# Patient Record
Sex: MALE | Employment: FULL TIME | URBAN - METROPOLITAN AREA
[De-identification: names, ages, dates, MRNs, and addresses within clinical notes are randomized per-mention and may not be internally consistent; named-entity substitution may affect disease eponyms.]

---

## 2018-11-20 ENCOUNTER — OFFICE VISIT (OUTPATIENT)
Dept: FAMILY MEDICINE | Facility: CLINIC | Age: 58
End: 2018-11-20
Payer: COMMERCIAL

## 2018-11-20 ENCOUNTER — OFFICE VISIT (OUTPATIENT)
Dept: PHARMACY | Facility: CLINIC | Age: 58
End: 2018-11-20
Payer: COMMERCIAL

## 2018-11-20 VITALS
BODY MASS INDEX: 26.67 KG/M2 | TEMPERATURE: 98 F | SYSTOLIC BLOOD PRESSURE: 159 MMHG | HEART RATE: 77 BPM | DIASTOLIC BLOOD PRESSURE: 90 MMHG | HEIGHT: 68 IN | WEIGHT: 176 LBS | RESPIRATION RATE: 16 BRPM | OXYGEN SATURATION: 98 %

## 2018-11-20 DIAGNOSIS — I10 HYPERTENSION GOAL BP (BLOOD PRESSURE) < 130/80: ICD-10-CM

## 2018-11-20 DIAGNOSIS — Z79.4 TYPE 2 DIABETES MELLITUS WITHOUT COMPLICATION, WITH LONG-TERM CURRENT USE OF INSULIN (H): Primary | ICD-10-CM

## 2018-11-20 DIAGNOSIS — E11.9 TYPE 2 DIABETES MELLITUS WITHOUT COMPLICATION, WITH LONG-TERM CURRENT USE OF INSULIN (H): Primary | ICD-10-CM

## 2018-11-20 DIAGNOSIS — R39.11 URINARY HESITANCY: ICD-10-CM

## 2018-11-20 DIAGNOSIS — Z12.11 SPECIAL SCREENING FOR MALIGNANT NEOPLASMS, COLON: ICD-10-CM

## 2018-11-20 DIAGNOSIS — Z23 FLU VACCINE NEED: ICD-10-CM

## 2018-11-20 DIAGNOSIS — I10 BENIGN ESSENTIAL HYPERTENSION: ICD-10-CM

## 2018-11-20 DIAGNOSIS — K21.00 GASTROESOPHAGEAL REFLUX DISEASE WITH ESOPHAGITIS: ICD-10-CM

## 2018-11-20 DIAGNOSIS — Z79.899 MEDICATION MANAGEMENT: ICD-10-CM

## 2018-11-20 DIAGNOSIS — E78.5 HYPERLIPIDEMIA LDL GOAL <100: ICD-10-CM

## 2018-11-20 LAB
ALBUMIN SERPL-MCNC: 4.3 G/DL (ref 3.4–5)
ALP SERPL-CCNC: 137 U/L (ref 40–150)
ALT SERPL W P-5'-P-CCNC: 24 U/L (ref 0–70)
ANION GAP SERPL CALCULATED.3IONS-SCNC: 7 MMOL/L (ref 3–14)
AST SERPL W P-5'-P-CCNC: 13 U/L (ref 0–45)
BILIRUB SERPL-MCNC: 0.5 MG/DL (ref 0.2–1.3)
BILIRUBIN UR: NEGATIVE MG/DL
BLOOD UR: NEGATIVE MG/DL
BUN SERPL-MCNC: 15 MG/DL (ref 7–30)
CALCIUM SERPL-MCNC: 9.3 MG/DL (ref 8.5–10.1)
CHLORIDE SERPL-SCNC: 108 MMOL/L (ref 94–109)
CLARITY, URINE: CLEAR
CO2 SERPL-SCNC: 25 MMOL/L (ref 20–32)
COLOR UR: YELLOW
CREAT SERPL-MCNC: 0.88 MG/DL (ref 0.66–1.25)
CREAT UR-MCNC: 139 MG/DL
ERYTHROCYTE [DISTWIDTH] IN BLOOD BY AUTOMATED COUNT: 13.9 % (ref 10–15)
GFR SERPL CREATININE-BSD FRML MDRD: 88 ML/MIN/1.7M2
GLUCOSE SERPL-MCNC: 70 MG/DL (ref 70–99)
GLUCOSE URINE: NEGATIVE MG/DL
HBA1C MFR BLD: 7 % (ref 4.1–5.7)
HCT VFR BLD AUTO: 44.4 % (ref 40–53)
HGB BLD-MCNC: 14.3 G/DL (ref 13.3–17.7)
KETONES UR QL: NEGATIVE MG/DL
LEUKOCYTE ESTERASE UR: NEGATIVE U/L
MCH RBC QN AUTO: 28.9 PG (ref 26.5–33)
MCHC RBC AUTO-ENTMCNC: 32.2 G/DL (ref 31.5–36.5)
MCV RBC AUTO: 90 FL (ref 78–100)
MICROALBUMIN UR-MCNC: 88 MG/L
MICROALBUMIN/CREAT UR: 63.02 MG/G CR (ref 0–17)
NITRITE UR QL STRIP: NEGATIVE MG/DL
PH UR STRIP: 5.5 [PH] (ref 5–7)
PLATELET # BLD AUTO: 322 10E9/L (ref 150–450)
POTASSIUM SERPL-SCNC: 4.5 MMOL/L (ref 3.4–5.3)
PROT SERPL-MCNC: 8.2 G/DL (ref 6.8–8.8)
PROTEIN UR: NORMAL MG/DL
PSA SERPL-ACNC: 1.18 UG/L (ref 0–4)
RBC # BLD AUTO: 4.94 10E12/L (ref 4.4–5.9)
SODIUM SERPL-SCNC: 140 MMOL/L (ref 133–144)
SP GR UR STRIP: 1.02
TSH SERPL DL<=0.005 MIU/L-ACNC: 1.83 MU/L (ref 0.4–4)
UROBILINOGEN UR STRIP-ACNC: NORMAL E.U./DL
VIT B12 SERPL-MCNC: 371 PG/ML (ref 193–986)
WBC # BLD AUTO: 12.2 10E9/L (ref 4–11)

## 2018-11-20 RX ORDER — INSULIN GLARGINE 100 [IU]/ML
25 INJECTION, SOLUTION SUBCUTANEOUS 2 TIMES DAILY
Qty: 15 ML | Refills: 2 | Status: SHIPPED | OUTPATIENT
Start: 2018-11-20 | End: 2019-01-08

## 2018-11-20 RX ORDER — LIRAGLUTIDE 6 MG/ML
1.2 INJECTION SUBCUTANEOUS DAILY
COMMUNITY
End: 2018-11-20

## 2018-11-20 RX ORDER — AMLODIPINE BESYLATE 10 MG/1
10 TABLET ORAL DAILY
COMMUNITY
End: 2018-11-20

## 2018-11-20 RX ORDER — ASPIRIN 81 MG/1
81 TABLET, CHEWABLE ORAL DAILY
COMMUNITY
End: 2018-11-20

## 2018-11-20 RX ORDER — INSULIN GLARGINE 100 [IU]/ML
25 INJECTION, SOLUTION SUBCUTANEOUS 2 TIMES DAILY
COMMUNITY
End: 2018-11-20

## 2018-11-20 RX ORDER — ATORVASTATIN CALCIUM 10 MG/1
40 TABLET, FILM COATED ORAL DAILY
Qty: 90 TABLET | Refills: 1 | Status: SHIPPED | OUTPATIENT
Start: 2018-11-20 | End: 2019-02-26

## 2018-11-20 RX ORDER — METOPROLOL TARTRATE 100 MG
100 TABLET ORAL 2 TIMES DAILY
COMMUNITY
End: 2018-11-20

## 2018-11-20 RX ORDER — ASPIRIN 81 MG/1
81 TABLET, CHEWABLE ORAL DAILY
Qty: 100 TABLET | Refills: 1 | Status: SHIPPED | OUTPATIENT
Start: 2018-11-20 | End: 2019-11-12

## 2018-11-20 RX ORDER — METFORMIN HYDROCHLORIDE 500 MG/5ML
500 SOLUTION ORAL
COMMUNITY
End: 2018-11-20

## 2018-11-20 RX ORDER — LISINOPRIL 40 MG/1
40 TABLET ORAL DAILY
Qty: 90 TABLET | Refills: 1 | Status: SHIPPED | OUTPATIENT
Start: 2018-11-20 | End: 2019-07-12

## 2018-11-20 RX ORDER — LIRAGLUTIDE 6 MG/ML
1.2 INJECTION SUBCUTANEOUS DAILY
Qty: 6 ML | Refills: 2 | Status: SHIPPED | OUTPATIENT
Start: 2018-11-20 | End: 2019-03-12

## 2018-11-20 RX ORDER — METOPROLOL TARTRATE 100 MG
100 TABLET ORAL 2 TIMES DAILY
Qty: 60 TABLET | Refills: 2 | Status: SHIPPED | OUTPATIENT
Start: 2018-11-20 | End: 2019-03-13

## 2018-11-20 RX ORDER — AMLODIPINE BESYLATE 10 MG/1
10 TABLET ORAL DAILY
Qty: 30 TABLET | Refills: 2 | Status: SHIPPED | OUTPATIENT
Start: 2018-11-20 | End: 2019-03-12

## 2018-11-20 ASSESSMENT — PATIENT HEALTH QUESTIONNAIRE - PHQ9
5. POOR APPETITE OR OVEREATING: SEVERAL DAYS
SUM OF ALL RESPONSES TO PHQ QUESTIONS 1-9: 4

## 2018-11-20 ASSESSMENT — ANXIETY QUESTIONNAIRES
6. BECOMING EASILY ANNOYED OR IRRITABLE: SEVERAL DAYS
2. NOT BEING ABLE TO STOP OR CONTROL WORRYING: SEVERAL DAYS
7. FEELING AFRAID AS IF SOMETHING AWFUL MIGHT HAPPEN: NOT AT ALL
GAD7 TOTAL SCORE: 6
1. FEELING NERVOUS, ANXIOUS, OR ON EDGE: SEVERAL DAYS
3. WORRYING TOO MUCH ABOUT DIFFERENT THINGS: SEVERAL DAYS
5. BEING SO RESTLESS THAT IT IS HARD TO SIT STILL: SEVERAL DAYS
IF YOU CHECKED OFF ANY PROBLEMS ON THIS QUESTIONNAIRE, HOW DIFFICULT HAVE THESE PROBLEMS MADE IT FOR YOU TO DO YOUR WORK, TAKE CARE OF THINGS AT HOME, OR GET ALONG WITH OTHER PEOPLE: NOT DIFFICULT AT ALL

## 2018-11-20 NOTE — PROGRESS NOTES
"       HPI       Phani K Phani Denney is a 58 year old  who presents for   Chief Complaint   Patient presents with     Establish Care     Pt. presents to the clinic today to establish care.    58 year-old male originally from the [place] presents as new patient to establish care.   PMH: Type 2 DM for many years. Started on Victoza in the past year but did not tolerate dose increase to 1.8mg. Developed deep pain in chest, so he decreased dose to 1.2 mg per day. He is also on Glargine insulin and metformin which he tolerates well. He takes Glargine twice daily because it works better for him Reports BS range from 7-150. He does have symptoms of hypoglycemia with BS of 70 and these are relieved by eating. Highest BS he reports is 150. Last recorded A1c is 8.4    HTN: is out of lisinopril currently. Usually takes lisinopril, amlodipine, and metoprolol. Occasional headache which he relates to his eyes to some extent. Reports had bilateral cataract repair 1 year ago and there was\"something wrong\" with left eye but this has gotten better. Due for eye exam 12/2018 or early 1/19. Renal function on chart review has been WNL.     Hyperlipidemia: not fasting but taking lipitor regularly    LIZ: history of LIZ, not every day. Takes ranitidine 150mg twice daily most days.  Does not use caffeine or alcohol or drugs. Remote history of snuff, but nothing recent.      No . Speaks and understands English. Alternate language is Sinhala    Problem, Medication and Allergy Lists were      Patient Active Problem List    Diagnosis Date Noted     Type 2 diabetes mellitus without complication (H) 10/15/2015     Priority: Medium     Pain in joint, shoulder region 03/11/2014     Priority: Medium     Impingement syndrome, shoulder 10/28/2013     Priority: Medium     Advanced directives, counseling/discussion 03/12/2013     Priority: Medium     Advance Care Planning:   ACP Review and Resources Provided:  Reviewed chart for advance " care plan.  Chapo Villaloboskadi Cowan has no plan or code status on file. Discussed available resources and provided with information. Confirmed code status reflects current choices pending further ACP discussions.  Confirmed/documented designated decision maker(s). See permanent comments section of demographics in clinical tab.   Added by Eduar Shaw on 3/12/2013           Type 2 diabetes, HbA1C goal < 8% (H) 04/26/2012     Priority: Medium     CARDIOVASCULAR SCREENING; LDL GOAL LESS THAN 100 12/23/2011     Priority: Medium     Hypertension goal BP (blood pressure) < 130/80 12/23/2011     Priority: Medium     Hypercholesteremia 12/10/2010     Priority: Medium         Current Outpatient Prescriptions   Medication Sig Dispense Refill     amLODIPine (NORVASC) 10 MG tablet Take 1 tablet (10 mg) by mouth daily 30 tablet 2     aspirin 81 MG chewable tablet Take 1 tablet (81 mg) by mouth daily 100 tablet 1     atorvastatin (LIPITOR) 10 MG tablet Take 4 tablets (40 mg) by mouth daily 90 tablet 1     blood glucose monitoring (ACCU-CHEK MULTICLIX) lancets Use to test blood sugar 2 x daily or as directed. 100 each 1     blood glucose monitoring (NO BRAND SPECIFIED) test strip Test twice daily. 2 Box 2     insulin glargine (BASAGLAR KWIKPEN) 100 UNIT/ML pen Inject 25 Units Subcutaneous 2 times daily 15 mL 2     insulin pen needle (31G X 8 MM) 31G X 8 MM miscellaneous Use to inject insulin twice daily 100 each 0     liraglutide (VICTOZA) 18 MG/3ML solution Inject 1.2 mg Subcutaneous daily 6 mL 2     lisinopril (PRINIVIL/ZESTRIL) 40 MG tablet Take 1 tablet (40 mg) by mouth daily 90 tablet 1     metFORMIN (GLUCOPHAGE) 500 MG tablet Take 2 tablets (1,000 mg) by mouth 2 times daily (with meals) 120 tablet 2     metoprolol tartrate (LOPRESSOR) 100 MG tablet Take 1 tablet (100 mg) by mouth 2 times daily 60 tablet 2     ranitidine (ZANTAC) 150 MG tablet Take 1 tablet (150 mg) by mouth 2 times daily 60 tablet 2     [DISCONTINUED]  amLODIPine (NORVASC) 10 MG tablet Take 10 mg by mouth daily       [DISCONTINUED] amLODIPine (NORVASC) 5 MG tablet Take 1 tablet (5 mg) by mouth daily 90 tablet 1     [DISCONTINUED] ATORVASTATIN CALCIUM PO Take 40 mg by mouth daily       [DISCONTINUED] insulin glargine (BASAGLAR KWIKPEN) 100 UNIT/ML pen Inject 25 Units Subcutaneous 2 times daily       [DISCONTINUED] insulin glargine (LANTUS SOLOSTAR) 100 UNIT/ML PEN Inject 55 Units Subcutaneous 2 times daily 3 Month 3     [DISCONTINUED] liraglutide (VICTOZA) 18 MG/3ML solution Inject 1.2 mg Subcutaneous daily        [DISCONTINUED] lisinopril (PRINIVIL,ZESTRIL) 40 MG tablet Take 1 tablet (40 mg) by mouth daily 90 tablet 1     [DISCONTINUED] metFORMIN (GLUCOPHAGE) 500 MG tablet Take 1,000 mg by mouth 2 times daily (with meals)       [DISCONTINUED] metFORMIN (GLUCOPHAGE) 500 MG/5ML SOLN solution Take 500 mg by mouth daily (with lunch)       [DISCONTINUED] metoprolol tartrate (LOPRESSOR) 100 MG tablet Take 100 mg by mouth 2 times daily           Allergies   Allergen Reactions     Latex    .    Patient is   a new patient to this clinic and so  I reviewed/updated the Past Medical History, the Family History and the Social History   Past Medical History:   Diagnosis Date     Diabetes mellitus (H)      GERD (gastroesophageal reflux disease)      Hypercholesteremia      Hypertension      Family History   Problem Relation Age of Onset     Diabetes Mother       late 50's     Diabetes Father       54, Heart attack     Eye Disorder Father      HEART DISEASE Father      HEART DISEASE Paternal Uncle      Social History     Social History     Marital status: Single     Spouse name: N/A     Number of children: N/A     Years of education: N/A   DIET: Ethnic diet: greens, meat, anjera. Some American foods  Occupational History     unemployed      Social History Main Topics     Smoking status: Never Smoker     Smokeless tobacco: Former User     Types: Snuff     Quit  "date: 11/20/2003     Alcohol use No     Drug use: No     Sexual activity: Not Currently     Other Topics Concern     Parent/Sibling W/ Cabg, Mi Or Angioplasty Before 65f 55m? No     Social History Narrative    Currently unemployed. Lives in neighborhood       .         Review of Systems:   Review of Systems  GEN: denies fatigue. Sleeps OK most nights. Occasional trouble falling asleep. Good appetite. Active; walks regularly. Weight stable.   HEENT: denies vision or hearing problems. Had bilateral catarract surgery last year. Had trouble in left eye, but better now. Wears glasses to watch TV only.  Denies URI symptoms. No recent dental.   ENDO: Type 2 DM as per HPI  RESP: negative for cough, SOB.  CV: episodes of deep chest pain subsided when decreased Victoza dose. No other chest pain. No longer using NTG ( has not for many years). Did uses ASA when had chest pain episodes. Denies irregular heart beat or peripheral edema.   GI: history of GERD. Has weekly episodes of reflux. Uses ranitidine several times per week, 2 times per day.   : occasional trouble starting urine stream. Had PSA in past which was normal. Awakens x2 at night to urinate. Denies dysuria, urgency. Is not sexually active.   MSK: has had trouble with shoulder in the past. OK now.    Neuro: occasional headaches. Occasional burning sensations on soles of feet when walking.          Physical Exam:     Vitals:    11/20/18 1316 11/20/18 1403   BP: 155/81 159/90   BP Location: Left arm Left arm   Patient Position: Chair    Cuff Size: Adult Regular    Pulse: 84 77   Resp: 16    Temp: 98  F (36.7  C)    TempSrc: Oral    SpO2: 98%    Weight: 176 lb (79.8 kg)    Height: 5' 7.8\" (172.2 cm)      Body mass index is 26.92 kg/(m^2).  Vital signs normal except BP elevated. Recheck was 159/90 off Lisinopril currently     Physical Exam  GEN: Alert talkative male in no acute distress  HEENT: Eyes clear, STACIE, EOM intact.  Accommodation and convergence intact. " Ears: TMs gray with LR. Nose: clear without edema or discharge. OP: clear. Dentition: plaque evident, some cavities and missing teeth.   NECK: supple. Thyroid smooth and not enlarged.    Lungs: Clear to auscultation with air entry throughout  CV: HRRR, S1, S2, no MRG  ABD: Soft with BSx4, no HSM; nontender. No masses  : deferred  MSK: walks without difficulty. Nontender to palpation over spine.   FOOT EXAM: Warm, pink, pedal pulses+1 bilaterally. Thickened nails. Sensation intact to monofilament bilaterally. Callouses on heels; no open areas or lesions.   NEURO: Gait intact.       Results:      Results from this visit  Results for orders placed or performed in visit on 11/20/18   Hemoglobin A1c ([facility])   Result Value Ref Range    Hemoglobin A1C 7.0 (H) 4.1 - 5.7 %       Assessment and Plan        Phani was seen today for establish care.    Diagnoses and all orders for this visit:    Type 2 diabetes mellitus without complication, with long-term current use of insulin (H)  -     Comprehensive metabolic panel  -     Albumin Random Urine Quantitative with Creat Ratio  -     Vitamin D Level  -     Hemoglobin A1c ([facility])  -     blood glucose monitoring (ACCU-CHEK MULTICLIX) lancets; Use to test blood sugar 2 x daily or as directed.  -     blood glucose monitoring (NO BRAND SPECIFIED) test strip; Test twice daily.  -     insulin glargine (BASAGLAR KWIKPEN) 100 UNIT/ML pen; Inject 25 Units Subcutaneous 2 times daily  -     insulin pen needle (31G X 8 MM) 31G X 8 MM miscellaneous; Use to inject insulin twice daily  -     liraglutide (VICTOZA) 18 MG/3ML solution; Inject 1.2 mg Subcutaneous daily  -     metFORMIN (GLUCOPHAGE) 500 MG tablet; Take 2 tablets (1,000 mg) by mouth 2 times daily (with meals)    Benign essential hypertension  -     Comprehensive metabolic panel  -     CBC with platelets  -     Albumin Random Urine Quantitative with Creat Ratio  -     Urinalysis, Micro If (UA) ([facility])  -     amLODIPine (NORVASC) 10 MG tablet; Take 1 tablet (10 mg) by mouth daily  -     aspirin 81 MG chewable tablet; Take 1 tablet (81 mg) by mouth daily  -     metoprolol tartrate (LOPRESSOR) 100 MG tablet; Take 1 tablet (100 mg) by mouth 2 times daily    Hyperlipidemia LDL goal <100  -     atorvastatin (LIPITOR) 10 MG tablet; Take 4 tablets (40 mg) by mouth daily    Medication management  -     TSH with free T4 reflex  -     Vitamin B12    Urinary hesitancy  -     Prostate spec antigen screen    Hypertension goal BP (blood pressure) < 130/80  -     lisinopril (PRINIVIL/ZESTRIL) 40 MG tablet; Take 1 tablet (40 mg) by mouth daily    Gastroesophageal reflux disease with esophagitis  -     ranitidine (ZANTAC) 150 MG tablet; Take 1 tablet (150 mg) by mouth 2 times daily    Flu vaccine need  -     HC FLU VAC PRESRV FREE QUAD SPLIT VIR 3+YRS IM  -     ADMIN VACCINE, INITIAL    Special screening for malignant neoplasms, colon  -     Fecal colorectal cancer screen FITT; Future    Patient seen with Pharm D, reviewed and updated all medications.   Plan to follow up on LIZ next visit in 2 weeks     Flu vaccine was not given. Will do at 2 week follow up. Leah SILVA CNP  Medications Discontinued During This Encounter   Medication Reason     metoprolol (TOPROL-XL) 50 MG 24 hr tablet Medication Reconciliation Clean Up     amLODIPine (NORVASC) 5 MG tablet Dose adjustment     nitroglycerin (NITROSTAT) 0.4 MG SL tablet Stopped by Patient     polyethylene glycol (MIRALAX) powder Stopped by Patient     simvastatin (ZOCOR) 40 MG tablet Stopped by Patient     guaiFENesin-dextromethorphan (ROBITUSSIN DM) 100-10 MG/5ML syrup Stopped by Patient     metFORMIN (GLUCOPHAGE) 500 MG/5ML SOLN solution      insulin glargine (LANTUS SOLOSTAR) 100 UNIT/ML PEN Medication Reconciliation Clean Up     aspirin 81 MG EC tablet Medication Reconciliation Clean Up     amLODIPine (NORVASC) 10 MG tablet Reorder     aspirin 81 MG chewable  tablet Reorder     ATORVASTATIN CALCIUM PO Reorder     blood glucose monitoring (ACCU-CHEK MULTICLIX) lancets Reorder     blood glucose test strip Reorder     insulin glargine (BASAGLAR KWIKPEN) 100 UNIT/ML pen Reorder     insulin pen needle 31G X 8 MM Reorder     liraglutide (VICTOZA) 18 MG/3ML solution Reorder     lisinopril (PRINIVIL,ZESTRIL) 40 MG tablet Reorder     metFORMIN (GLUCOPHAGE) 500 MG tablet Reorder     metoprolol tartrate (LOPRESSOR) 100 MG tablet Reorder     ranitidine (ZANTAC) 150 MG tablet Reorder       Options for treatment and follow-up care were reviewed with the patient. Phani Denney  engaged in the decision making process and verbalized understanding of the options discussed and agreed with the final plan. Patient seen and examined with Jessy Khan RN, FNP DNP Student RICARDO Horner CNP  I was present with the NPP student who participated in the service and in the documentation of the services provided. I have verified the history and personally performed the physical exam and medical decision making, as documented by the student and edited by me    RICARDO De Luna CNP  01/22/19  2:17 PM

## 2018-11-20 NOTE — PROGRESS NOTES
SUBJECTIVE: Chapo is a 58 year old male who was referred by Leah Ford for [facility] services for a comprehensive medication review.   He is interested in establishing care in this clinic.  He is from [place], but has now lived in [place]  for many years. tHe lives by himself. He does not work right now, but is interested in working.    Diabetes: He is taking metformin, insulin glargine (25 units BID) and liraglutide 1.2 mg daily. He notes that his blood sugar today was 70. He feels that blood sugar is between 115, 150. He notes that diabetes runs in his family. He believes that his father, mother, and brother all  in their 50s of issues related to diabetes. He notes that when his Victoza was increased from 1.2 to 1.8 mg he had pain deep in his heart, however, when he switched back to liraglutide 1.2 mg daily, the pain went away. He feels that his weight has gotten under control when he started to take liraglutide.    When talking about his diet, he notes that he eats breads, okra, stews, meats, salsa (many foods from his home country).    Diarrhea: He notes that he previously had diarrhea, but when he decreased his liraglutide dose, diarrhea improved.    Neuropathy: He notes he has some tingling in his arms and legs.    Heartburn: uses ranitidine once - twice/day. Feels heartburn is pretty well controlled.    Blood pressure: Is taking amlodipine, lisinopril, and metoprolol.     Environmental factors that impact patient: Lives on his own. Is an immigrant from [place].    Patient reports some difficulty taking evening doses of medications. He thinks he may miss the evening dose about 3 times a week.    Patient Active Problem List   Diagnosis     Hypercholesteremia     CARDIOVASCULAR SCREENING; LDL GOAL LESS THAN 100     Hypertension goal BP (blood pressure) < 130/80     Type 2 diabetes, HbA1C goal < 8% (H)     Advanced directives, counseling/discussion     Impingement syndrome, shoulder     Pain in joint, shoulder region  "    Type 2 diabetes mellitus without complication (H)        OBJECTIVE:     SAURAV-7 SCORE 11/20/2018   Total Score 6       PHQ-9 SCORE 11/20/2018   Total Score 4         VITALS:  BP Readings from Last 3 Encounters:   11/20/18 155/81   06/12/14 142/80   01/16/14 136/79           Weight:   Wt Readings from Last 1 Encounters:   11/20/18 176 lb (79.8 kg)       Height:   Ht Readings from Last 1 Encounters:   11/20/18 5' 7.8\" (172.2 cm)       LABORATORY VALUES:   Last A1C:    Lab Results   Component Value Date    A1C 8.8 06/06/2014   .    Last Basic Metabolic Panel:  Lab Results   Component Value Date     06/06/2014      Lab Results   Component Value Date    POTASSIUM 4.4 06/06/2014     Lab Results   Component Value Date    CHLORIDE 103 06/06/2014     Lab Results   Component Value Date    LIU 9.1 06/06/2014     Lab Results   Component Value Date    CO2 22 06/06/2014     Lab Results   Component Value Date    BUN 16 06/06/2014     Lab Results   Component Value Date    CR 0.83 06/06/2014     Lab Results   Component Value Date     06/06/2014       Lipid Panel Labs  Lab Results   Component Value Date    CHOL 255 06/06/2014    CHOL 183 02/18/2014     Lab Results   Component Value Date    TRIG 247 06/06/2014    TRIG 192 02/18/2014     Lab Results   Component Value Date    HDL 31 06/06/2014    HDL 35 02/18/2014     Lab Results   Component Value Date     06/06/2014     02/18/2014       Hepatic Panel Labs  Lab Results   Component Value Date    AST 26 06/06/2014     Lab Results   Component Value Date    ALT 28 06/06/2014         SOCIAL AND FAMILY HISTORY  Social History   Substance Use Topics     Smoking status: Never Smoker     Smokeless tobacco: Never Used     Alcohol use No    .  Most Recent Immunizations   Administered Date(s) Administered     Influenza (IIV3) PF 12/23/2011     Pneumococcal 23 valent 12/23/2011     TDAP Vaccine (Adacel) 12/23/2011       CURRENT MEDICATIONS:   Current Outpatient " Prescriptions   Medication Sig Dispense Refill     amLODIPine (NORVASC) 5 MG tablet Take 1 tablet (5 mg) by mouth daily 90 tablet 1     aspirin 81 MG EC tablet Take 1 tablet (81 mg) by mouth daily 120 tablet 1     ATORVASTATIN CALCIUM PO Take 40 mg by mouth daily       blood glucose monitoring (ACCU-CHEK MULTICLIX) lancets Use to test blood sugar 2 x daily or as directed. 1 Box 2     blood glucose test strip Test twice daily. 2 Box 2     guaiFENesin-dextromethorphan (ROBITUSSIN DM) 100-10 MG/5ML syrup Take 5 mLs by mouth every 4 hours as needed for cough 560 mL 0     insulin glargine (LANTUS SOLOSTAR) 100 UNIT/ML PEN Inject 55 Units Subcutaneous 2 times daily 3 Month 3     insulin pen needle 31G X 8 MM Use to inject insulin twice daily 100 each 0     liraglutide (VICTOZA) 18 MG/3ML solution Inject Subcutaneous daily       lisinopril (PRINIVIL,ZESTRIL) 40 MG tablet Take 1 tablet (40 mg) by mouth daily 90 tablet 1     metFORMIN (GLUCOPHAGE) 500 MG/5ML SOLN solution Take 500 mg by mouth daily (with lunch)       metoprolol (TOPROL-XL) 50 MG 24 hr tablet Take 1 tablet (50 mg) by mouth daily 90 tablet 3     nitroglycerin (NITROSTAT) 0.4 MG SL tablet Place 1 tablet (0.4 mg) under the tongue every 5 minutes as needed for chest pain 25 tablet 0     polyethylene glycol (MIRALAX) powder Take 17 g by mouth daily (Patient not taking: Reported on 11/20/2018) 510 g 1     simvastatin (ZOCOR) 40 MG tablet Take 1.5 tablets (60 mg) by mouth At Bedtime (Patient not taking: Reported on 11/20/2018) 135 tablet 2       ALLERGIES:     Allergies   Allergen Reactions     Latex           ASSESSMENT:    Diabetes: Not at goal.  Goal is A1c <7-7.5%. It is likely that some of Phani's difficulty in taking his medication is contributing to his current diabetes control.  DTP: Needs alternative therapy (metformin XR versus IR)  DTP: Needs alternative dose (insulin glargine once daily dosing versus BID)    Diarrhea: At goal now.  It is likely, as Phani  suspected, that the increased dose of liraglutide was affecting his blood sugar.  It is best for now to continue  His current dose of liraglutide based on his past difficulty with diarrhea.    Neuropathy: Unclear how bothersome this may or may not be for Phani. Will need to investigate more in the future.     Heartburn: At goal per Phani.  He does not always need to take ranitidine because his heartburn is largely controlled.    Blood pressure: NOt at goal based on BP taken in clinic today.  However, Phani has not taken his lisinopril today. Additionally, it is possible that missed doses of metoprolol are contributing to uncontrolled BP.  DTP:       All medications were reviewed and found to be indicated, effective, safe and convenient unless drug therapy problem identified as described above.    PLAN:     ***    Options for treatment and/or follow-up care were reviewed with the patient. Chapo Cowan was engaged and actively involved in the decision making process. He/She verbalized understanding of the options discussed and was satisfied with the final plan.    Patient was provided with written instructions/medication list via AVS.       Medical conditions reviewed: {NUMBER 0-5:83659}    Medications reviewed: {NUMBER 0-5:59974}    DTP identified: {NUMBER 0-5:00479}    Time spent: {TIME:25853}    Level of service:{NUMBER 0-5:20693}

## 2018-11-20 NOTE — MR AVS SNAPSHOT
After Visit Summary   2018    Phani Denney    MRN: [id number]           Patient Information     Date Of Birth          1960        Visit Information        Provider Department      2018 2:30 PM Lenora Mo, PharmD [facility]        Today's Diagnoses     Type 2 diabetes mellitus without complication, with long-term current use of insulin (H)    -  1    Hypertension goal BP (blood pressure) < 130/80           Follow-ups after your visit        Future tests that were ordered for you today     Open Future Orders        Priority Expected Expires Ordered    Fecal colorectal cancer screen FITT Routine 2018            Who to contact     Please call your clinic at [phone] to:    Ask questions about your health    Make or cancel appointments    Discuss your medicines    Learn about your test results    Speak to your doctor            Additional Information About Your Visit        MyChart Information     [employer] is an electronic gateway that provides easy, online access to your medical records. With [employer] you can request a clinic appointment, read your test results, renew a prescription or communicate with your care team.     To sign up for [employer] visit the website at [email or web address]   You will be asked to enter the access code listed below, as well as some personal information. Please follow the directions to create your username and password.     Your access code is: [email or web address]  Expires: 2019  2:45 PM     Your access code will  in 90 days. If you need help or a new code, please contact your [facility] or call [phone] for assistance.        Care EveryWhere ID     This is your Care EveryWhere ID. This could be used by other organizations to access your [place] medical records  [id number]         Blood Pressure from Last 3 Encounters:   18 159/90   14 142/80    01/16/14 136/79    Weight from Last 3 Encounters:   11/20/18 176 lb (79.8 kg)   06/12/14 185 lb 9.6 oz (84.2 kg)   03/04/14 188 lb (85.3 kg)              We Performed the Following     MEDICATION THERAPY, FACE TO FACE,  EA ADDITIONAL 15 MIN     MEDICATION THERAPY, FACE TO FACE,  EA ADDITIONAL 15 MIN          Today's Medication Changes          These changes are accurate as of 11/20/18  3:10 PM.  If you have any questions, ask your nurse or doctor.               These medicines have changed or have updated prescriptions.        Dose/Directions    amLODIPine 10 MG tablet   Commonly known as:  NORVASC   This may have changed:  Another medication with the same name was removed. Continue taking this medication, and follow the directions you see here.   Used for:  Benign essential hypertension   Changed by:  Leah Ford APRN CNP        Dose:  10 mg   Take 1 tablet (10 mg) by mouth daily   Quantity:  30 tablet   Refills:  2       metFORMIN 500 MG tablet   Commonly known as:  GLUCOPHAGE   This may have changed:  Another medication with the same name was removed. Continue taking this medication, and follow the directions you see here.   Used for:  Type 2 diabetes mellitus without complication, with long-term current use of insulin (H)   Changed by:  Leah Ford APRN CNP        Dose:  1000 mg   Take 2 tablets (1,000 mg) by mouth 2 times daily (with meals)   Quantity:  120 tablet   Refills:  2            Where to get your medicines      These medications were sent to [facility] [address]  [address] [address]     Phone:  [phone]     amLODIPine 10 MG tablet    aspirin 81 MG chewable tablet    atorvastatin 10 MG tablet    blood glucose monitoring lancets    blood glucose monitoring test strip    insulin glargine 100 UNIT/ML pen    insulin pen needle 31G X 8 MM miscellaneous    liraglutide 18 MG/3ML solution    lisinopril 40 MG tablet     metFORMIN 500 MG tablet    metoprolol tartrate 100 MG tablet    ranitidine 150 MG tablet                Primary Care Provider Office Phone # Fax #    Suresh Genaro Yang [phone]       [address] [address]        Equal Access to Services     [facility] : Hadii aad ku hadasho Soomaali, waaxda luqadaha, qaybta kaalmada adeegyada, waxay idiin hayxochitln adechandrika rae laleatha . So [facility] [phone].    ATENCIÓN: Si habla español, tiene a luciano disposición servicios gratuitos de asistencia lingüística. Celio al [phone].    We comply with applicable federal civil rights laws and [place] laws. We do not discriminate on the basis of race, color, national origin, age, disability, sex, sexual orientation, or gender identity.            Thank you!     Thank you for choosing [facility]  for your care. Our goal is always to provide you with excellent care. Hearing back from our patients is one way we can continue to improve our services. Please take a few minutes to complete the written survey that you may receive in the mail after your visit with us. Thank you!             Your Updated Medication List - Protect others around you: Learn how to safely use, store and throw away your medicines at www.disposemymeds.org.          This list is accurate as of 11/20/18  3:10 PM.  Always use your most recent med list.                   Brand Name Dispense Instructions for use Diagnosis    amLODIPine 10 MG tablet    NORVASC    30 tablet    Take 1 tablet (10 mg) by mouth daily    Benign essential hypertension       aspirin 81 MG chewable tablet     100 tablet    Take 1 tablet (81 mg) by mouth daily    Benign essential hypertension       atorvastatin 10 MG tablet    LIPITOR    90 tablet    Take 4 tablets (40 mg) by mouth daily    Hyperlipidemia LDL goal <100       blood glucose monitoring lancets     100 each    Use to test blood sugar 2 x daily or as directed.    Type  2 diabetes mellitus without complication, with long-term current use of insulin (H)       blood glucose monitoring test strip    no brand specified    2 Box    Test twice daily.    Type 2 diabetes mellitus without complication, with long-term current use of insulin (H)       insulin glargine 100 UNIT/ML pen     15 mL    Inject 25 Units Subcutaneous 2 times daily    Type 2 diabetes mellitus without complication, with long-term current use of insulin (H)       insulin pen needle 31G X 8 MM miscellaneous    31G X 8 MM    100 each    Use to inject insulin twice daily    Type 2 diabetes mellitus without complication, with long-term current use of insulin (H)       liraglutide 18 MG/3ML solution    VICTOZA    6 mL    Inject 1.2 mg Subcutaneous daily    Type 2 diabetes mellitus without complication, with long-term current use of insulin (H)       lisinopril 40 MG tablet    PRINIVIL/ZESTRIL    90 tablet    Take 1 tablet (40 mg) by mouth daily    Hypertension goal BP (blood pressure) < 130/80       metFORMIN 500 MG tablet    GLUCOPHAGE    120 tablet    Take 2 tablets (1,000 mg) by mouth 2 times daily (with meals)    Type 2 diabetes mellitus without complication, with long-term current use of insulin (H)       metoprolol tartrate 100 MG tablet    LOPRESSOR    60 tablet    Take 1 tablet (100 mg) by mouth 2 times daily    Benign essential hypertension       ranitidine 150 MG tablet    ZANTAC    60 tablet    Take 1 tablet (150 mg) by mouth 2 times daily    Gastroesophageal reflux disease with esophagitis

## 2018-11-20 NOTE — MR AVS SNAPSHOT
After Visit Summary   11/20/2018    Phani Denney    MRN: [id number]           Patient Information     Date Of Birth          1960        Visit Information        Provider Department      11/20/2018 1:00 PM Leah Ford APRN CNP [facility]        Today's Diagnoses     Type 2 diabetes mellitus without complication, with long-term current use of insulin (H)    -  1    Benign essential hypertension        Hyperlipidemia LDL goal <100        Medication management        Urinary hesitancy        Hypertension goal BP (blood pressure) < 130/80        Gastroesophageal reflux disease with esophagitis        Flu vaccine need        Special screening for malignant neoplasms, colon          Care Instructions    Diabetes Type 2 without complications  Labs today: A1c, CMP, UA, microalbumin  Vitamin B12, Vitamin D  Refilled Metformin, Victoza at dose 1.2mg, glargine insulin, lancets, test strips, needles    Hypertension   Refilled medications: lisinopril, amlodipine, metoprolol  Limit red meats, high salt foods. Continue with greens, healthy diet  Get regular exercise  Drink plenty of water    Hyperlipidemia  Check lipids next visit when fasting 10-12 hours  Avoid high fat foods  Plenty of exercises    Health Maintenance  Flu vaccine Today  Fit test    Schedule appointment in 2 weeks to review labs and recheck blood pressure    Schedule eye appointment in 1-2 months.             Follow-ups after your visit        Future tests that were ordered for you today     Open Future Orders        Priority Expected Expires Ordered    Fecal colorectal cancer screen FITT Routine 12/11/2018 2/12/2019 11/20/2018            Who to contact     Please call your clinic at [phone] to:    Ask questions about your health    Make or cancel appointments    Discuss your medicines    Learn about your test results    Speak to your doctor            Additional Information About Your Visit        Tomy  "Information     [employer] is an electronic gateway that provides easy, online access to your medical records. With [employer], you can request a clinic appointment, read your test results, renew a prescription or communicate with your care team.     To sign up for [employer] visit the website at [email or web address]   You will be asked to enter the access code listed below, as well as some personal information. Please follow the directions to create your username and password.     Your access code is: [email or web address]  Expires: 2019  2:45 PM     Your access code will  in 90 days. If you need help or a new code, please contact your [facility] or call [phone] for assistance.        Care EveryWhere ID     This is your Care EveryWhere ID. This could be used by other organizations to access your [place] medical records  [id number]        Your Vitals Were     Pulse Temperature Respirations Height Pulse Oximetry BMI (Body Mass Index)    77 98  F (36.7  C) (Oral) 16 5' 7.8\" (172.2 cm) 98% 26.92 kg/m2       Blood Pressure from Last 3 Encounters:   18 159/90   14 142/80   14 136/79    Weight from Last 3 Encounters:   18 176 lb (79.8 kg)   14 185 lb 9.6 oz (84.2 kg)   14 188 lb (85.3 kg)              We Performed the Following     ADMIN VACCINE, INITIAL     Albumin Random Urine Quantitative with Creat Ratio     CBC with platelets     Comprehensive metabolic panel     HC FLU VAC PRESRV FREE QUAD SPLIT VIR 3+YRS IM     Hemoglobin A1c ([facility] NP CLINIC)     Prostate spec antigen screen     TSH with free T4 reflex     Urinalysis, Micro If (UA) ([facility])     Vitamin B12     Vitamin D Level          Today's Medication Changes          These changes are accurate as of 18  2:46 PM.  If you have any questions, ask your nurse or doctor.               These medicines have changed or have updated prescriptions.        Dose/Directions    " amLODIPine 10 MG tablet   Commonly known as:  NORVASC   This may have changed:  Another medication with the same name was removed. Continue taking this medication, and follow the directions you see here.   Used for:  Benign essential hypertension   Changed by:  Leah Ford APRN CNP        Dose:  10 mg   Take 1 tablet (10 mg) by mouth daily   Quantity:  30 tablet   Refills:  2       metFORMIN 500 MG tablet   Commonly known as:  GLUCOPHAGE   This may have changed:  Another medication with the same name was removed. Continue taking this medication, and follow the directions you see here.   Used for:  Type 2 diabetes mellitus without complication, with long-term current use of insulin (H)   Changed by:  Leah Ford APRN CNP        Dose:  1000 mg   Take 2 tablets (1,000 mg) by mouth 2 times daily (with meals)   Quantity:  120 tablet   Refills:  2            Where to get your medicines      These medications were sent to [facility] [place], [place] - [address]  [address], [facility] [address]     Phone:  [phone]     amLODIPine 10 MG tablet    aspirin 81 MG chewable tablet    atorvastatin 10 MG tablet    blood glucose monitoring lancets    blood glucose monitoring test strip    insulin glargine 100 UNIT/ML pen    insulin pen needle 31G X 8 MM miscellaneous    liraglutide 18 MG/3ML solution    lisinopril 40 MG tablet    metFORMIN 500 MG tablet    metoprolol tartrate 100 MG tablet    ranitidine 150 MG tablet                Primary Care Provider Office Phone # Fax #    Suresh Yang [phone]       [address]   [facility] [address]        Equal Access to Services     [facility] : Hadii john uriaso Soorin, waaxda luqadaha, qaybta kaalmada adeegyada, monique lang. So [facility] [phone].    ATENCIÓN: Si habla español, tiene a luciano disposición servicios gratuitos de asistencia lingüística.  Celio gardy [phone].    We comply with applicable federal civil rights laws and [place] laws. We do not discriminate on the basis of race, color, national origin, age, disability, sex, sexual orientation, or gender identity.            Thank you!     Thank you for choosing [facility]  for your care. Our goal is always to provide you with excellent care. Hearing back from our patients is one way we can continue to improve our services. Please take a few minutes to complete the written survey that you may receive in the mail after your visit with us. Thank you!             Your Updated Medication List - Protect others around you: Learn how to safely use, store and throw away your medicines at www.disposemymeds.org.          This list is accurate as of 11/20/18  2:46 PM.  Always use your most recent med list.                   Brand Name Dispense Instructions for use Diagnosis    amLODIPine 10 MG tablet    NORVASC    30 tablet    Take 1 tablet (10 mg) by mouth daily    Benign essential hypertension       aspirin 81 MG chewable tablet     100 tablet    Take 1 tablet (81 mg) by mouth daily    Benign essential hypertension       atorvastatin 10 MG tablet    LIPITOR    90 tablet    Take 4 tablets (40 mg) by mouth daily    Hyperlipidemia LDL goal <100       blood glucose monitoring lancets     100 each    Use to test blood sugar 2 x daily or as directed.    Type 2 diabetes mellitus without complication, with long-term current use of insulin (H)       blood glucose monitoring test strip    no brand specified    2 Box    Test twice daily.    Type 2 diabetes mellitus without complication, with long-term current use of insulin (H)       insulin glargine 100 UNIT/ML pen     15 mL    Inject 25 Units Subcutaneous 2 times daily    Type 2 diabetes mellitus without complication, with long-term current use of insulin (H)       insulin pen needle 31G X 8 MM miscellaneous    31G X 8 MM    100 each    Use to inject insulin  twice daily    Type 2 diabetes mellitus without complication, with long-term current use of insulin (H)       liraglutide 18 MG/3ML solution    VICTOZA    6 mL    Inject 1.2 mg Subcutaneous daily    Type 2 diabetes mellitus without complication, with long-term current use of insulin (H)       lisinopril 40 MG tablet    PRINIVIL/ZESTRIL    90 tablet    Take 1 tablet (40 mg) by mouth daily    Hypertension goal BP (blood pressure) < 130/80       metFORMIN 500 MG tablet    GLUCOPHAGE    120 tablet    Take 2 tablets (1,000 mg) by mouth 2 times daily (with meals)    Type 2 diabetes mellitus without complication, with long-term current use of insulin (H)       metoprolol tartrate 100 MG tablet    LOPRESSOR    60 tablet    Take 1 tablet (100 mg) by mouth 2 times daily    Benign essential hypertension       ranitidine 150 MG tablet    ZANTAC    60 tablet    Take 1 tablet (150 mg) by mouth 2 times daily    Gastroesophageal reflux disease with esophagitis

## 2018-11-20 NOTE — LETTER
November 21, 2018       TO: Phani Denney  [address]   [facility] [address]       DearMr.Phani Denney,    We are writing to inform you of your test results.    Test results indicate you may require additional follow up, see comment below.    Resulted Orders   Comprehensive metabolic panel   Result Value Ref Range    Sodium 140 133 - 144 mmol/L    Potassium 4.5 3.4 - 5.3 mmol/L    Chloride 108 94 - 109 mmol/L    Carbon Dioxide 25 20 - 32 mmol/L    Anion Gap 7 3 - 14 mmol/L    Glucose 70 70 - 99 mg/dL    Urea Nitrogen 15 7 - 30 mg/dL    Creatinine 0.88 0.66 - 1.25 mg/dL    GFR Estimate 88 >60 mL/min/1.7m2      Comment:      Non  GFR Calc    GFR Estimate If Black >90 >60 mL/min/1.7m2      Comment:       GFR Calc    Calcium 9.3 8.5 - 10.1 mg/dL    Bilirubin Total 0.5 0.2 - 1.3 mg/dL    Albumin 4.3 3.4 - 5.0 g/dL    Protein Total 8.2 6.8 - 8.8 g/dL    Alkaline Phosphatase 137 40 - 150 U/L    ALT 24 0 - 70 U/L    AST 13 0 - 45 U/L   CBC with platelets   Result Value Ref Range    WBC 12.2 (H) 4.0 - 11.0 10e9/L    RBC Count 4.94 4.4 - 5.9 10e12/L    Hemoglobin 14.3 13.3 - 17.7 g/dL    Hematocrit 44.4 40.0 - 53.0 %    MCV 90 78 - 100 fl    MCH 28.9 26.5 - 33.0 pg    MCHC 32.2 31.5 - 36.5 g/dL    RDW 13.9 10.0 - 15.0 %    Platelet Count 322 150 - 450 10e9/L   TSH with free T4 reflex   Result Value Ref Range    TSH 1.83 0.40 - 4.00 mU/L   Prostate spec antigen screen   Result Value Ref Range    PSA 1.18 0 - 4 ug/L      Comment:      Assay Method:  Chemiluminescence using Siemens Vista analyzer   Albumin Random Urine Quantitative with Creat Ratio   Result Value Ref Range    Creatinine Urine 139 mg/dL    Albumin Urine mg/L 88 mg/L    Albumin Urine mg/g Cr 63.02 (H) 0 - 17 mg/g Cr   Vitamin B12   Result Value Ref Range    Vitamin B12 371 193 - 986 pg/mL   Vitamin D Level   Result Value Ref Range    Vitamin D Deficiency screening 16 (L) 20 - 75 ug/L      Comment:       Season, race, dietary intake, and treatment affect the concentration of   25-hydroxy-Vitamin D. Values may decrease during winter months and increase   during summer months. Values 20-29 ug/L may indicate Vitamin D insufficiency   and values <20 ug/L may indicate Vitamin D deficiency.  Vitamin D determination is routinely performed by an immunoassay specific for   25 hydroxyvitamin D3.  If an individual is on vitamin D2 (ergocalciferol)   supplementation, please specify 25 OH vitamin D2 and D3 level determination by   LCMSMS test [id number].     Hemoglobin A1c ([facility] CLIN[facility])   Result Value Ref Range    Hemoglobin A1C 7.0 (H) 4.1 - 5.7 %   Urinalysis, Micro If (UA) ([facility])   Result Value Ref Range    Leukocyte Esterase UR Negative Negative U/l    Nitrite Urine Negative Negative mg/dL    Protein UR Trace Negative mg/dL    Glucose Urine Negative Negative mg/dL    Ketones Urine Negative Negative mg/dL    Urobilinogen mg/dL 0.2 E.U./dL 0.2 E.U./dL    Bilirubin UR Negative Negative mg/dL    Blood UR Negative Negative mg/dL    Specific Gravity Urine 1.025 1.005 - 1.030    pH Urine 5.5 4.5 - 8.0    Color Urine Yellow Yellow    Clarity, urine Clear Clear       Mr. Phani Denney, your lab work looks pretty good. Your blood sugar was quite low yesterday when you were in.   We will want to keep at eye on that. It would be good for you to carry hard candy with you or something that could provide a quick increase  In your blood sugar.  Your kidney function measured by creatinine and glomerular filtration rate is good, but you are spilling a little protein in your urine that we will need to watch.  Your electrolytes and B12 are within normal limits. Your vitamin D level is low and you would benefit from replacement and/or supplementation and we will discuss this at your next visit.  Your 3 month blood sugar average was very good -- 7.0 so you have come down considerably from previous. Your urinanalysis was within normal  limits. Your complete blood count was just a little high at 12.2. Please return if you develop a fever or malaise. This can be a sign of infection, although there was no evidence of that on your exam. Your liver function, prostate, and thyroid levels are all normal. Thank you,    Leah SILVA CNP

## 2018-11-20 NOTE — PATIENT INSTRUCTIONS
Diabetes Type 2 without complications  Labs today: A1c, CMP, UA, microalbumin  Vitamin B12, Vitamin D  Refilled Metformin, Victoza at dose 1.2mg, glargine insulin, lancets, test strips, needles    Hypertension   Refilled medications: lisinopril, amlodipine, metoprolol  Limit red meats, high salt foods. Continue with greens, healthy diet  Get regular exercise  Drink plenty of water    Hyperlipidemia  Check lipids next visit when fasting 10-12 hours  Avoid high fat foods  Plenty of exercises    Health Maintenance  Flu vaccine Today  Fit test    Schedule appointment in 2 weeks to review labs and recheck blood pressure    Schedule eye appointment in 1-2 months.

## 2018-11-20 NOTE — NURSING NOTE
"58 year old  Chief Complaint   Patient presents with     Establish Care     Pt. presents to the clinic today to establish care.        Blood pressure 155/81, pulse 84, temperature 98  F (36.7  C), temperature source Oral, resp. rate 16, height 5' 7.8\" (172.2 cm), weight 176 lb (79.8 kg), SpO2 98 %. Body mass index is 26.92 kg/(m^2).  BP completed using cuff size:    Patient Active Problem List   Diagnosis     Hypercholesteremia     CARDIOVASCULAR SCREENING; LDL GOAL LESS THAN 100     Hypertension goal BP (blood pressure) < 130/80     Type 2 diabetes, HbA1C goal < 8% (H)     Advanced directives, counseling/discussion     Impingement syndrome, shoulder     Pain in joint, shoulder region     Type 2 diabetes mellitus without complication (H)       Wt Readings from Last 2 Encounters:   11/20/18 176 lb (79.8 kg)   06/12/14 185 lb 9.6 oz (84.2 kg)     BP Readings from Last 3 Encounters:   11/20/18 155/81   06/12/14 142/80   01/16/14 136/79       Allergies   Allergen Reactions     Latex        Current Outpatient Prescriptions   Medication     amLODIPine (NORVASC) 5 MG tablet     aspirin 81 MG EC tablet     ATORVASTATIN CALCIUM PO     blood glucose monitoring (ACCU-CHEK MULTICLIX) lancets     blood glucose test strip     guaiFENesin-dextromethorphan (ROBITUSSIN DM) 100-10 MG/5ML syrup     insulin glargine (LANTUS SOLOSTAR) 100 UNIT/ML PEN     insulin pen needle 31G X 8 MM     liraglutide (VICTOZA) 18 MG/3ML solution     lisinopril (PRINIVIL,ZESTRIL) 40 MG tablet     metFORMIN (GLUCOPHAGE) 500 MG/5ML SOLN solution     metoprolol (TOPROL-XL) 50 MG 24 hr tablet     nitroglycerin (NITROSTAT) 0.4 MG SL tablet     polyethylene glycol (MIRALAX) powder     simvastatin (ZOCOR) 40 MG tablet     No current facility-administered medications for this visit.        Social History   Substance Use Topics     Smoking status: Never Smoker     Smokeless tobacco: Never Used     Alcohol use No         Honoring Choices - Health Care Directive " Guide offered to patient at time of visit.    Health Maintenance Due   Topic Date Due     PHQ-2 Q1 YR  01/01/1970     HIV SCREEN (SYSTEM ASSIGNED)  01/01/1976     HEPATITIS C SCREENING  01/01/1976     COLON CANCER SCREEN (SYSTEM ASSIGNED)  01/01/2008     A1C Q6 MO  12/06/2014     CREATININE Q1 YEAR  06/06/2015     LIPID MONITORING Q1 YEAR  06/06/2015     MICROALBUMIN Q1 YEAR  06/06/2015     FOOT EXAM Q1 YEAR  06/12/2015     EYE EXAM Q1 YEAR  06/20/2015     TSH W/ FREE T4 REFLEX Q2 YEAR  01/08/2016     ADVANCE DIRECTIVE PLANNING Q5 YRS  03/12/2018     INFLUENZA VACCINE (1) 09/01/2018       Immunization History   Administered Date(s) Administered     Influenza (IIV3) PF 12/23/2011     Pneumococcal 23 valent 12/23/2011     TDAP Vaccine (Adacel) 12/23/2011       No results found for: PAP      Recent Labs   Lab Test  06/06/14   0945  02/18/14   0826  01/08/14   1521  12/09/13   0944   03/12/13   0843   12/23/11   1512   A1C  8.8*  9.1*   --   9.7*   < >  10.6*   < >  9.7*   LDL  174*  110   --   128   --   131*   < >  111   HDL  31*  35*   --   30*   --   39*   < >  35*   TRIG  247*  192*   --   155*   --   148   < >  196*   ALT  28   --   30   --    --   25   < >  22   CR  0.83   --   0.88   --    < >  0.87   < >  0.90   GFRESTIMATED  >90   --   90   --    < >  >90   < >  88   GFRESTBLACK  >90   --   >90   --    < >  >90   < >  >90   ALBUMIN  4.1   --   4.1   --    --   4.2   < >  4.2   POTASSIUM  4.4   --   4.3   --    < >  3.8   < >  4.2   TSH   --    --   1.83   --    --    --    --   0.93    < > = values in this interval not displayed.       PHQ-2 ( 1999 Pfizer) 11/20/2018 1/16/2014   Q1: Little interest or pleasure in doing things 0 0   Q2: Feeling down, depressed or hopeless 1 0   PHQ-2 Score 1 0       PHQ-9 SCORE 11/20/2018   Total Score 4       SAURAV-7 SCORE 11/20/2018   Total Score 6       No flowsheet data found.    Vannesa Nolen CMA  November 20, 2018 1:22 PM

## 2018-11-21 LAB — DEPRECATED CALCIDIOL+CALCIFEROL SERPL-MC: 16 UG/L (ref 20–75)

## 2018-11-21 ASSESSMENT — ANXIETY QUESTIONNAIRES: GAD7 TOTAL SCORE: 6

## 2018-12-04 ENCOUNTER — OFFICE VISIT (OUTPATIENT)
Dept: FAMILY MEDICINE | Facility: CLINIC | Age: 58
End: 2018-12-04
Payer: COMMERCIAL

## 2018-12-04 ENCOUNTER — OFFICE VISIT (OUTPATIENT)
Dept: PHARMACY | Facility: CLINIC | Age: 58
End: 2018-12-04
Payer: COMMERCIAL

## 2018-12-04 VITALS
BODY MASS INDEX: 26.37 KG/M2 | OXYGEN SATURATION: 98 % | RESPIRATION RATE: 16 BRPM | HEIGHT: 68 IN | DIASTOLIC BLOOD PRESSURE: 76 MMHG | TEMPERATURE: 97.7 F | SYSTOLIC BLOOD PRESSURE: 149 MMHG | HEART RATE: 91 BPM | WEIGHT: 174 LBS

## 2018-12-04 DIAGNOSIS — R07.89 OTHER CHEST PAIN: ICD-10-CM

## 2018-12-04 DIAGNOSIS — E11.649 TYPE 2 DIABETES MELLITUS WITH HYPOGLYCEMIA WITHOUT COMA, WITH LONG-TERM CURRENT USE OF INSULIN (H): ICD-10-CM

## 2018-12-04 DIAGNOSIS — E55.9 VITAMIN D DEFICIENCY: ICD-10-CM

## 2018-12-04 DIAGNOSIS — R80.1 PERSISTENT PROTEINURIA: ICD-10-CM

## 2018-12-04 DIAGNOSIS — I10 HYPERTENSION GOAL BP (BLOOD PRESSURE) < 130/80: ICD-10-CM

## 2018-12-04 DIAGNOSIS — Z23 FLU VACCINE NEED: ICD-10-CM

## 2018-12-04 DIAGNOSIS — E11.9 TYPE 2 DIABETES MELLITUS WITHOUT COMPLICATION, WITH LONG-TERM CURRENT USE OF INSULIN (H): Primary | ICD-10-CM

## 2018-12-04 DIAGNOSIS — Z79.4 TYPE 2 DIABETES MELLITUS WITH HYPOGLYCEMIA WITHOUT COMA, WITH LONG-TERM CURRENT USE OF INSULIN (H): ICD-10-CM

## 2018-12-04 DIAGNOSIS — I10 BENIGN ESSENTIAL HYPERTENSION: Primary | ICD-10-CM

## 2018-12-04 DIAGNOSIS — Z79.4 TYPE 2 DIABETES MELLITUS WITHOUT COMPLICATION, WITH LONG-TERM CURRENT USE OF INSULIN (H): Primary | ICD-10-CM

## 2018-12-04 RX ORDER — ERGOCALCIFEROL 1.25 MG/1
50000 CAPSULE, LIQUID FILLED ORAL
Qty: 8 CAPSULE | Refills: 0 | Status: SHIPPED | OUTPATIENT
Start: 2018-12-04 | End: 2019-06-25

## 2018-12-04 NOTE — PROGRESS NOTES
"       HPI       Phani JOSE Jeronimo is a 58 year old  who presents for   Chief Complaint   Patient presents with     RECHECK     Pt. presents to the clinic today for a follow up.      58 year-old male with history of DM 2, HTN returns for follow up and review of lab results. Lisinopril 40mg was resumed at last visit and will recheck BP today. He has not checked BP since then but feels better.   BS last visit was 70 and he reports he has low BS 1-2 x per month and experiences shakiness and feels hungry. He eats and this is resolved. He sometimes carries food with him, but does not have glucose tablets.  Blood sugars have been running OK by report.   Notes 1 episode of midsternal to left chest pain. It is unclear of etiology, whether due to stomach acid, cough he developed in last week or cardiac in nature. He took ranitidine and this helped.   Reports he developed cough and nasal congestion due to the \"weather change\". Denies fever or productive cough and states symptoms are better than they were. Denies current nasal congestion or headache. Has not had flu vaccine and indicates when he got flu vaccine in 2011 he became ill 3 weeks after the vaccine and worried it was due to receiving the vaccine.   WBC was elevated on lab check at last visit. Indicates he had dental issues which have resolved. He is in need of dental care and denies fever. Last dental exam was more than 1 year ago.   Problem, Medication and Allergy Lists were   reviewed and updated if needed.     Patient Active Problem List    Diagnosis Date Noted     Type 2 diabetes mellitus without complication (H) 10/15/2015     Priority: Medium     Pain in joint, shoulder region 03/11/2014     Priority: Medium     Impingement syndrome, shoulder 10/28/2013     Priority: Medium     Advanced directives, counseling/discussion 03/12/2013     Priority: Medium     Advance Care Planning:   ACP Review and Resources Provided:  Reviewed chart for advance care plan.  Chapo " Jumana Cowan has no plan or code status on file. Discussed available resources and provided with information. Confirmed code status reflects current choices pending further ACP discussions.  Confirmed/documented designated decision maker(s). See permanent comments section of demographics in clinical tab.   Added by Eduar Shaw on 3/12/2013           Type 2 diabetes, HbA1C goal < 8% (H) 04/26/2012     Priority: Medium     CARDIOVASCULAR SCREENING; LDL GOAL LESS THAN 100 12/23/2011     Priority: Medium     Hypertension goal BP (blood pressure) < 130/80 12/23/2011     Priority: Medium     Hypercholesteremia 12/10/2010     Priority: Medium         Current Outpatient Prescriptions   Medication Sig Dispense Refill     amLODIPine (NORVASC) 10 MG tablet Take 1 tablet (10 mg) by mouth daily 30 tablet 2     aspirin 81 MG chewable tablet Take 1 tablet (81 mg) by mouth daily 100 tablet 1     atorvastatin (LIPITOR) 10 MG tablet Take 4 tablets (40 mg) by mouth daily 90 tablet 1     blood glucose monitoring (ACCU-CHEK MULTICLIX) lancets Use to test blood sugar 2 x daily or as directed. 100 each 1     blood glucose monitoring (NO BRAND SPECIFIED) test strip Test twice daily. 2 Box 2     Blood Pressure Monitoring (PREMIUM AUTOMATIC BP MONITOR) CHIARA 1 Device daily 1 each 0     glucose (BD GLUCOSE) 5 g chewable tablet Take 2 tablets (10 g) by mouth as needed (prn blood sugar < 70) 15 tablet 0     insulin glargine (BASAGLAR KWIKPEN) 100 UNIT/ML pen Inject 25 Units Subcutaneous 2 times daily 15 mL 2     insulin pen needle (31G X 8 MM) 31G X 8 MM miscellaneous Use to inject insulin twice daily 100 each 0     liraglutide (VICTOZA) 18 MG/3ML solution Inject 1.2 mg Subcutaneous daily 6 mL 2     lisinopril (PRINIVIL/ZESTRIL) 40 MG tablet Take 1 tablet (40 mg) by mouth daily 90 tablet 1     metFORMIN (GLUCOPHAGE) 500 MG tablet Take 2 tablets (1,000 mg) by mouth 2 times daily (with meals) 120 tablet 2     metoprolol tartrate (LOPRESSOR)  100 MG tablet Take 1 tablet (100 mg) by mouth 2 times daily 60 tablet 2     ranitidine (ZANTAC) 150 MG tablet Take 1 tablet (150 mg) by mouth 2 times daily 60 tablet 2     vitamin D2 (ERGOCALCIFEROL) 26233 units (1250 mcg) capsule Take 1 capsule (50,000 Units) by mouth every 7 days for 8 doses 8 capsule 0         Allergies   Allergen Reactions     Latex    .    Patient is   an established patient of this clinic.  Past Medical History:   Diagnosis Date     Diabetes mellitus (H)      GERD (gastroesophageal reflux disease)      Hypercholesteremia      Hypertension      Family History   Problem Relation Age of Onset     Diabetes Mother       late 50's     Diabetes Father       54, Heart attack     Eye Disorder Father      Heart Disease Father      Heart Disease Paternal Uncle      Social History     Social History     Marital status: Single     Spouse name: N/A     Number of children: N/A     Years of education: N/A     Occupational History     unemployed      Social History Main Topics     Smoking status: Never Smoker     Smokeless tobacco: Former User     Types: Snuff     Quit date: 2003     Alcohol use No     Drug use: No     Sexual activity: Not Currently     Other Topics Concern     Parent/Sibling W/ Cabg, Mi Or Angioplasty Before 65f 55m? No     Social History Narrative    Currently unemployed. Lives in neighborhood       .         Review of Systems:   Review of Systems  GEN: weight stable. Working on diet and exercise. Plans to reduce red meat and smoked meats. Plans to increase exercise. Denies fever or chills  HEENT: as per HPI. Needs eye exam. Plans to schedule eye appointment  RESP: cough as per HPI  CV: chest pain as per HPI. Denies peripheral edema, irregular heart beat  GI: occasional heartburn. Taking ranitidine  ENDO: BS as per HPI. Taking all medications for DM.   NEURO: denies headache, dizziness.        Physical Exam:     Vitals:    18 0946   BP: 149/76   BP  "Location: Left arm   Patient Position: Chair   Cuff Size: Adult Regular   Pulse: 91   Resp: 16   Temp: 97.7  F (36.5  C)   TempSrc: Oral   SpO2: 98%   Weight: 174 lb (78.9 kg)   Height: 5' 8.3\" (173.5 cm)     Body mass index is 26.22 kg/(m^2).  Vital signs normal except BP still elevated but improved from previous     Physical Exam  GEN: alert talkative male in no acute distress  HEENT: Eyes clear. Ears; TMs gray with LR. Moderate cerument left. Nose: nasal discharge left nare, mild erythema turbinates. OP: no evidence of abscess or infection. Oral mucosa moist, pink.  Teeth: carries, no gingival edema or erythema noted.  LYMPH: negative  LUNGS: Clear to auscultation with air entry throughout  CV: HRRR, S1, S2, no MRG. No peripheral edema. EKG: NSR consistent with previous. No evidence of ischemia            Results:   No testing ordered today    Assessment and Plan        1. Benign essential hypertension  Reinforced plan for healthy diet, reduced salt in diet and increased exercise  - Blood Pressure Monitoring (PREMIUM AUTOMATIC BP MONITOR) CHIARA; 1 Device daily  Dispense: 1 each; Refill: 0  - EKG 12-lead complete w/read - Clinics    2. Vitamin D deficiency  Plan daily supplement after replacement  - vitamin D2 (ERGOCALCIFEROL) 39777 units (1250 mcg) capsule; Take 1 capsule (50,000 Units) by mouth every 7 days for 8 doses  Dispense: 8 capsule; Refill: 0    3. Type 2 diabetes mellitus with hypoglycemia without coma, with long-term current use of insulin (H)  Discussed health diet, regular exercise. Reinforced efforts at healthy living and current A1c value of 7.0  Discussed signs and symptoms of low blood sugar and how to use glucose tablets  - glucose (BD GLUCOSE) 5 g chewable tablet; Take 2 tablets (10 g) by mouth as needed (prn blood sugar < 70)  Dispense: 15 tablet; Refill: 0      4. Other chest pain  Likely due to reflux, EKG WNL. Recent cough: considered side effect from resumption of lisinopril but presence of " other symptoms makes this less likely. Encouraged increased water to soothe throat and return to clinic if cough returns or becomes persistent  - EKG 12-lead complete w/read - Clinics    5. Flu vaccine need  Discussed side effects of flu vaccine, recommendations for flu vaccine  -  FLU VAC PRESRV FREE QUAD SPLIT VIR 3+YRS IM  - ADMIN VACCINE, INITIAL    6. Proteinuria  Review of old records indicates this has been ongoing problem, although eGFR and creatinine within normal limits. Discussed possibility of doing JUDY but patient declines at present. Will monitor every 6 months.     Encouraged patient to schedule dental visit and eye appointment. He verbalizes intent to do so  AICHA for other health systems obtained. May need Hep B vaccine, will review records      There are no discontinued medications.    Options for treatment and follow-up care were reviewed with the patient. Phani Jeronimo  engaged in the decision making process and verbalized understanding of the options discussed and agreed with the final plan.    Leah Ford, RICARDO CNP

## 2018-12-04 NOTE — NURSING NOTE
"58 year old  Chief Complaint   Patient presents with     RECHECK     Pt. presents to the clinic today for a follow up.        Blood pressure 149/76, pulse 91, temperature 97.7  F (36.5  C), temperature source Oral, resp. rate 16, height 5' 8.3\" (173.5 cm), weight 174 lb (78.9 kg), SpO2 98 %. Body mass index is 26.22 kg/(m^2).  BP completed using cuff size:    Patient Active Problem List   Diagnosis     Hypercholesteremia     CARDIOVASCULAR SCREENING; LDL GOAL LESS THAN 100     Hypertension goal BP (blood pressure) < 130/80     Type 2 diabetes, HbA1C goal < 8% (H)     Advanced directives, counseling/discussion     Impingement syndrome, shoulder     Pain in joint, shoulder region     Type 2 diabetes mellitus without complication (H)       Wt Readings from Last 2 Encounters:   12/04/18 174 lb (78.9 kg)   11/20/18 176 lb (79.8 kg)     BP Readings from Last 3 Encounters:   12/04/18 149/76   11/20/18 159/90   06/12/14 142/80       Allergies   Allergen Reactions     Latex        Current Outpatient Prescriptions   Medication     amLODIPine (NORVASC) 10 MG tablet     aspirin 81 MG chewable tablet     atorvastatin (LIPITOR) 10 MG tablet     blood glucose monitoring (ACCU-CHEK MULTICLIX) lancets     blood glucose monitoring (NO BRAND SPECIFIED) test strip     insulin glargine (BASAGLAR KWIKPEN) 100 UNIT/ML pen     insulin pen needle (31G X 8 MM) 31G X 8 MM miscellaneous     liraglutide (VICTOZA) 18 MG/3ML solution     lisinopril (PRINIVIL/ZESTRIL) 40 MG tablet     metFORMIN (GLUCOPHAGE) 500 MG tablet     metoprolol tartrate (LOPRESSOR) 100 MG tablet     ranitidine (ZANTAC) 150 MG tablet     No current facility-administered medications for this visit.        Social History   Substance Use Topics     Smoking status: Never Smoker     Smokeless tobacco: Former User     Types: Snuff     Quit date: 11/20/2003     Alcohol use No         Honoring Choices - Health Care Directive Guide offered to patient at time of visit.    Health " Maintenance Due   Topic Date Due     HIV SCREEN (SYSTEM ASSIGNED)  01/01/1976     HEPATITIS C SCREENING  01/01/1976     COLON CANCER SCREEN (SYSTEM ASSIGNED)  01/01/2008     LIPID MONITORING Q1 YEAR  06/06/2015     FOOT EXAM Q1 YEAR  06/12/2015     EYE EXAM Q1 YEAR  06/20/2015     ADVANCE DIRECTIVE PLANNING Q5 YRS  03/12/2018     INFLUENZA VACCINE (1) 09/01/2018       Immunization History   Administered Date(s) Administered     Influenza (IIV3) PF 12/23/2011     Pneumococcal 23 valent 12/23/2011     TDAP Vaccine (Adacel) 12/23/2011       No results found for: PAP      Recent Labs   Lab Test  11/20/18   1455  11/20/18   1426  06/06/14   0945  02/18/14   0826  01/08/14   1521  12/09/13   0944   A1C  7.0*   --   8.8*  9.1*   --   9.7*   LDL   --    --   174*  110   --   128   HDL   --    --   31*  35*   --   30*   TRIG   --    --   247*  192*   --   155*   ALT   --   24  28   --   30   --    CR   --   0.88  0.83   --   0.88   --    GFRESTIMATED   --   88  >90   --   90   --    GFRESTBLACK   --   >90  >90   --   >90   --    ALBUMIN   --   4.3  4.1   --   4.1   --    POTASSIUM   --   4.5  4.4   --   4.3   --    TSH   --   1.83   --    --   1.83   --        PHQ-2 ( 1999 Pfizer) 12/4/2018 11/20/2018   Q1: Little interest or pleasure in doing things 0 0   Q2: Feeling down, depressed or hopeless 0 1   PHQ-2 Score 0 1       PHQ-9 SCORE 11/20/2018   PHQ-9 Total Score 4       SAURAV-7 SCORE 11/20/2018   Total Score 6   EKG was done.  Injectable Influenza Immunization Documentation    1.  Has the patient received the information for the injectable influenza vaccine? YES     2. Is the patient 6 months of age or older? YES     3. Does the patient have any of the following contraindications?         Severe allergy to eggs? No     Severe allergic reaction to previous influenza vaccines? No   Severe allergy to latex? No       History of Guillain-Tyler syndrome? No     Currently have a temperature greater than 100.4F? No        4.   "Severely egg allergic patients should have flu vaccine eligibility assessed by an MD, RN, or pharmacist, and those who received flu vaccine should be observed for 15 min by an MD, RN, Pharmacist, Medical Technician, or member of clinic staff.\": YES    5. Latex-allergic patients should be given latex-free influenza vaccine Yes. Please reference the Vaccine latex table to determine if your clinic s product is latex-containing.       Vaccination given by Vannesa Nolen CMA      No flowsheet data found.    Vannesa Nolen CMA  December 4, 2018 9:52 AM  "

## 2018-12-04 NOTE — PROGRESS NOTES
SUBJECTIVE: Phani is a 58 year old male who presents for follow up.    HTN: At last visit, Phani had been out of his lisinopril for 2 weeks.  He has since resumed that medication and is taking amlodipine and metoprolol as well.  He notes that his diet has been recently particulary salty and discusses eating anchovies and salty smoked meat.  He feels that salt in his diet is contributing to his current BP. He does not monitor BP at home, but is interested in doing this.    DM: Phani continues insulin glargine, metformin, and liraglutide.  He notes that he does experience hypoglycemia (notes blood glucose in the 50s) about once a month.  He feels symptomatic - biggest symptom is hunger- when this happens.  At times, this is because he goes for a period of time without eating.  He tried to carry food with him, but doesn't consistently do this.      Preventative care: Phani has experienced influenza previously and notes that he was very symptomatic for about 3 weeks.  He had some doubts about the influenza vaccine since he was vaccinated when he had influenza.    Environmental factors that impact patient: not working currently.     Patient reports being compliant currently with medications.     Patient Active Problem List   Diagnosis     Hypercholesteremia     CARDIOVASCULAR SCREENING; LDL GOAL LESS THAN 100     Hypertension goal BP (blood pressure) < 130/80     Type 2 diabetes, HbA1C goal < 8% (H)     Advanced directives, counseling/discussion     Impingement syndrome, shoulder     Pain in joint, shoulder region     Type 2 diabetes mellitus without complication (H)        OBJECTIVE:     SAURAV-7 SCORE 11/20/2018   Total Score 6       PHQ-9 SCORE 11/20/2018   PHQ-9 Total Score 4         VITALS:  BP Readings from Last 3 Encounters:   12/04/18 149/76   11/20/18 159/90   06/12/14 142/80           Weight:   Wt Readings from Last 1 Encounters:   12/04/18 174 lb (78.9 kg)       Height:   Ht Readings from Last 1 Encounters:  "  12/04/18 5' 8.3\" (173.5 cm)       LABORATORY VALUES:   Last A1C:    Lab Results   Component Value Date    A1C 7.0 11/20/2018   .    Last Basic Metabolic Panel:  Lab Results   Component Value Date     11/20/2018      Lab Results   Component Value Date    POTASSIUM 4.5 11/20/2018     Lab Results   Component Value Date    CHLORIDE 108 11/20/2018     Lab Results   Component Value Date    LIU 9.3 11/20/2018     Lab Results   Component Value Date    CO2 25 11/20/2018     Lab Results   Component Value Date    BUN 15 11/20/2018     Lab Results   Component Value Date    CR 0.88 11/20/2018     Lab Results   Component Value Date    GLC 70 11/20/2018       Lipid Panel Labs  Lab Results   Component Value Date    CHOL 255 06/06/2014    CHOL 183 02/18/2014     Lab Results   Component Value Date    TRIG 247 06/06/2014    TRIG 192 02/18/2014     Lab Results   Component Value Date    HDL 31 06/06/2014    HDL 35 02/18/2014     Lab Results   Component Value Date     06/06/2014     02/18/2014       Hepatic Panel Labs  Lab Results   Component Value Date    AST 13 11/20/2018     Lab Results   Component Value Date    ALT 24 11/20/2018         SOCIAL AND FAMILY HISTORY  Social History   Substance Use Topics     Smoking status: Never Smoker     Smokeless tobacco: Former User     Types: Snuff     Quit date: 11/20/2003     Alcohol use No    .  Most Recent Immunizations   Administered Date(s) Administered     Influenza (IIV3) PF 12/23/2011     Pneumococcal 23 valent 12/23/2011     TDAP Vaccine (Adacel) 12/23/2011   Pended Date(s) Pended     Influenza Vaccine IM 3yrs+ 4 Valent IIV4 12/04/2018       CURRENT MEDICATIONS:   Current Outpatient Prescriptions   Medication Sig Dispense Refill     amLODIPine (NORVASC) 10 MG tablet Take 1 tablet (10 mg) by mouth daily 30 tablet 2     aspirin 81 MG chewable tablet Take 1 tablet (81 mg) by mouth daily 100 tablet 1     atorvastatin (LIPITOR) 10 MG tablet Take 4 tablets (40 mg) by mouth " daily 90 tablet 1     blood glucose monitoring (ACCU-CHEK MULTICLIX) lancets Use to test blood sugar 2 x daily or as directed. 100 each 1     blood glucose monitoring (NO BRAND SPECIFIED) test strip Test twice daily. 2 Box 2     Blood Pressure Monitoring (PREMIUM AUTOMATIC BP MONITOR) CHIARA 1 Device daily 1 each 0     glucose (BD GLUCOSE) 5 g chewable tablet Take 2 tablets (10 g) by mouth as needed (prn blood sugar < 70) 15 tablet 0     insulin glargine (BASAGLAR KWIKPEN) 100 UNIT/ML pen Inject 25 Units Subcutaneous 2 times daily 15 mL 2     insulin pen needle (31G X 8 MM) 31G X 8 MM miscellaneous Use to inject insulin twice daily 100 each 0     liraglutide (VICTOZA) 18 MG/3ML solution Inject 1.2 mg Subcutaneous daily 6 mL 2     lisinopril (PRINIVIL/ZESTRIL) 40 MG tablet Take 1 tablet (40 mg) by mouth daily 90 tablet 1     metFORMIN (GLUCOPHAGE) 500 MG tablet Take 2 tablets (1,000 mg) by mouth 2 times daily (with meals) 120 tablet 2     metoprolol tartrate (LOPRESSOR) 100 MG tablet Take 1 tablet (100 mg) by mouth 2 times daily 60 tablet 2     ranitidine (ZANTAC) 150 MG tablet Take 1 tablet (150 mg) by mouth 2 times daily 60 tablet 2     vitamin D2 (ERGOCALCIFEROL) 25429 units (1250 mcg) capsule Take 1 capsule (50,000 Units) by mouth every 7 days for 8 doses 8 capsule 0       ALLERGIES:     Allergies   Allergen Reactions     Latex           ASSESSMENT:  HTN: Not at goal.  Goal < 130/80. It is likely that Phani's diet is contributing to his elevated BP.  It is possible that Phani's BP will decrease to goal if he concentrates on lifestyle changes. It will also be best to obtain more BP measurements.  Drug therapy problem: Needs additional monitoring.     DM: Borderline goal based on A1c reading from last visit.  Goal A1c <7%. Since Phani is experiencing hypoglycemia occasionally, it will be safest for him to carry glucose tablets.  Drug therapy problem: Needs additional therapy    Preventative care: Phani is due for  influenza vaccine.  Also due for Hep B vaccine due to diabetes diagnosis. Also indicated for vitamin D supplement based on lab results from last visit.  Drug therapy problem: Needs additional therapy (influenza and hep B and vitamin D)    All medications were reviewed and found to be indicated, effective, safe and convenient unless drug therapy problem identified as described above.    PLAN:   - Home BP monitor ordered. Instructed to use daily and record BPs  - Prescription for glucose tablets and instructed when and how to take  - Due for influenza and Hep B. Influenza today and Hep B to administered at next visit.  - Follow up in one month for BP check.    Options for treatment and/or follow-up care were reviewed with the patient. Phani Jeronimo was engaged and actively involved in the decision making process. He/She verbalized understanding of the options discussed and was satisfied with the final plan.    Patient was provided with written instructions/medication list via AVS.       Medical conditions reviewed: 3    Medications reviewed: 11    DTP identified: 3    Time spent: 30 minutes    Level of service:4

## 2018-12-04 NOTE — MR AVS SNAPSHOT
After Visit Summary   2018    Phani Jeronimo    MRN: [id number]           Patient Information     Date Of Birth          1960        Visit Information        Provider Department      2018 10:30 AM Lenora Mo, PharmD [facility]        Today's Diagnoses     Type 2 diabetes mellitus without complication, with long-term current use of insulin (H)    -  1    Hypertension goal BP (blood pressure) < 130/80           Follow-ups after your visit        Your next 10 appointments already scheduled     2019  1:30 PM CST   Return Visit with RICARDO De Luna CNP   [facility] ([facility])    [address] [place] [address]   [phone]              Who to contact     Please call your clinic at [phone] to:    Ask questions about your health    Make or cancel appointments    Discuss your medicines    Learn about your test results    Speak to your doctor            Additional Information About Your Visit        MyChart Information     PeopleGoal is an electronic gateway that provides easy, online access to your medical records. With PeopleGoal, you can request a clinic appointment, read your test results, renew a prescription or communicate with your care team.     To sign up for PeopleGoal visit the website at [email or web address]   You will be asked to enter the access code listed below, as well as some personal information. Please follow the directions to create your username and password.     Your access code is: [email or web address]  Expires: 2019  2:45 PM     Your access code will  in 90 days. If you need help or a new code, please contact your [facility] or call [phone] for assistance.        Care EveryWhere ID     This is your Care EveryWhere ID. This could be used by other organizations to access your [place] medical records  [id number]         Blood Pressure from Last 3  Encounters:   12/04/18 149/76   11/20/18 159/90   06/12/14 142/80    Weight from Last 3 Encounters:   12/04/18 174 lb (78.9 kg)   11/20/18 176 lb (79.8 kg)   06/12/14 185 lb 9.6 oz (84.2 kg)              We Performed the Following     MEDICATION THERAPY, FACE TO FACE,  EA ADDITIONAL 15 MIN     MEDICATION THERAPY, FACE TO FACE,  EA ADDITIONAL 15 MIN     MEDICATION THERAPY, FACE TO FACE,  EA ADDITIONAL 15 MIN          Today's Medication Changes          These changes are accurate as of 12/4/18 11:32 AM.  If you have any questions, ask your nurse or doctor.               Start taking these medicines.        Dose/Directions    glucose 5 g chewable tablet   Commonly known as:  BD GLUCOSE   Used for:  Type 2 diabetes mellitus with hypoglycemia without coma, with long-term current use of insulin (H)   Started by:  Leah Ford APRN CNP        Dose:  2 tablet   Take 2 tablets (10 g) by mouth as needed (prn blood sugar < 70)   Quantity:  15 tablet   Refills:  0       PREMIUM AUTOMATIC BP MONITOR Anita   Used for:  Benign essential hypertension   Started by:  Leah Ford APRN CNP        Dose:  1 Device   1 Device daily   Quantity:  1 each   Refills:  0       vitamin D2 68517 units (1250 mcg) capsule   Commonly known as:  ERGOCALCIFEROL   Used for:  Vitamin D deficiency   Started by:  Leah Ford APRN CNP        Dose:  19540 Units   Take 1 capsule (50,000 Units) by mouth every 7 days for 8 doses   Quantity:  8 capsule   Refills:  0            Where to get your medicines      These medications were sent to [facility] [address]  [address] [address]     Phone:  [phone]     glucose 5 g chewable tablet    PREMIUM AUTOMATIC BP MONITOR Anita    vitamin D2 70100 units (1250 mcg) capsule                Primary Care Provider Office Phone # Fax #    Suresh Yang [phone]       [address]  [address]  [facility] [address]        Equal Access to Services     DENISE HENDRIX : Hadii aad ku hadgreggdon Maryorin, wajenniferda florin, qaitainder taylormamonique bobo. So [facility] [phone].    ATENCIÓN: Si habla español, tiene a luciano disposición servicios gratuitos de asistencia lingüística. Cieraame al [phone].    We comply with applicable federal civil rights laws and [place] laws. We do not discriminate on the basis of race, color, national origin, age, disability, sex, sexual orientation, or gender identity.            Thank you!     Thank you for choosing [facility]  for your care. Our goal is always to provide you with excellent care. Hearing back from our patients is one way we can continue to improve our services. Please take a few minutes to complete the written survey that you may receive in the mail after your visit with us. Thank you!             Your Updated Medication List - Protect others around you: Learn how to safely use, store and throw away your medicines at www.disposemymeds.org.          This list is accurate as of 12/4/18 11:32 AM.  Always use your most recent med list.                   Brand Name Dispense Instructions for use Diagnosis    amLODIPine 10 MG tablet    NORVASC    30 tablet    Take 1 tablet (10 mg) by mouth daily    Benign essential hypertension       aspirin 81 MG chewable tablet    ASA    100 tablet    Take 1 tablet (81 mg) by mouth daily    Benign essential hypertension       atorvastatin 10 MG tablet    LIPITOR    90 tablet    Take 4 tablets (40 mg) by mouth daily    Hyperlipidemia LDL goal <100       blood glucose monitoring lancets     100 each    Use to test blood sugar 2 x daily or as directed.    Type 2 diabetes mellitus without complication, with long-term current use of insulin (H)       blood glucose monitoring test strip    NO BRAND SPECIFIED    2 Box    Test twice daily.    Type 2 diabetes mellitus without complication,  with long-term current use of insulin (H)       glucose 5 g chewable tablet    BD GLUCOSE    15 tablet    Take 2 tablets (10 g) by mouth as needed (prn blood sugar < 70)    Type 2 diabetes mellitus with hypoglycemia without coma, with long-term current use of insulin (H)       insulin glargine 100 UNIT/ML pen     15 mL    Inject 25 Units Subcutaneous 2 times daily    Type 2 diabetes mellitus without complication, with long-term current use of insulin (H)       insulin pen needle 31G X 8 MM miscellaneous    31G X 8 MM    100 each    Use to inject insulin twice daily    Type 2 diabetes mellitus without complication, with long-term current use of insulin (H)       liraglutide 18 MG/3ML solution    VICTOZA    6 mL    Inject 1.2 mg Subcutaneous daily    Type 2 diabetes mellitus without complication, with long-term current use of insulin (H)       lisinopril 40 MG tablet    PRINIVIL/ZESTRIL    90 tablet    Take 1 tablet (40 mg) by mouth daily    Hypertension goal BP (blood pressure) < 130/80       metFORMIN 500 MG tablet    GLUCOPHAGE    120 tablet    Take 2 tablets (1,000 mg) by mouth 2 times daily (with meals)    Type 2 diabetes mellitus without complication, with long-term current use of insulin (H)       metoprolol tartrate 100 MG tablet    LOPRESSOR    60 tablet    Take 1 tablet (100 mg) by mouth 2 times daily    Benign essential hypertension       PREMIUM AUTOMATIC BP MONITOR Anita     1 each    1 Device daily    Benign essential hypertension       ranitidine 150 MG tablet    ZANTAC    60 tablet    Take 1 tablet (150 mg) by mouth 2 times daily    Gastroesophageal reflux disease with esophagitis       vitamin D2 98195 units (1250 mcg) capsule    ERGOCALCIFEROL    8 capsule    Take 1 capsule (50,000 Units) by mouth every 7 days for 8 doses    Vitamin D deficiency

## 2018-12-04 NOTE — PATIENT INSTRUCTIONS
Diabetes type 2  Continue with current plan: healthy diet: increase vegetables, decrease red and smoked meats. Check Blood Sugar every morning.    Ordered glucose tablets for you when your blood sugar gets low. Can take 2 tablets when feels shaky, lightheaded, confused or if blood sugar < 70.    High blood pressure  Check blood pressure a few times per week and record on paper log.   Ordered BP monitor for you  Decrease salt in diet  Recheck BP in 1 month. If still increased, consider adding medication    Chest pain  Likely due to cough or stomach acid (heartburn). EKG is normal and unchanged from previous. Return to clinic if continue to have episodes of pain    Protein in urine  Monitor  Recheck protein in urine in 3-6 months  Consider renal ultrasound if persistent. Renal function is within normal limits    Vitamin D deficiency  Ordered replacement: one 50,000 unit tablet weekly for 8 weeks  Will plan to follow with daily supplement    Influenza vaccine today    Send FIT test (stool for blood) back to us    Schedule dental appointment and eye appointment

## 2018-12-08 DIAGNOSIS — Z12.11 SPECIAL SCREENING FOR MALIGNANT NEOPLASMS, COLON: ICD-10-CM

## 2018-12-08 PROCEDURE — 82274 ASSAY TEST FOR BLOOD FECAL: CPT | Performed by: NURSE PRACTITIONER

## 2018-12-10 LAB — HEMOCCULT STL QL IA: NEGATIVE

## 2019-01-08 ENCOUNTER — OFFICE VISIT (OUTPATIENT)
Dept: PHARMACY | Facility: CLINIC | Age: 59
End: 2019-01-08
Payer: COMMERCIAL

## 2019-01-08 ENCOUNTER — OFFICE VISIT (OUTPATIENT)
Dept: FAMILY MEDICINE | Facility: CLINIC | Age: 59
End: 2019-01-08
Payer: COMMERCIAL

## 2019-01-08 VITALS
HEIGHT: 68 IN | TEMPERATURE: 97.5 F | DIASTOLIC BLOOD PRESSURE: 74 MMHG | BODY MASS INDEX: 26.67 KG/M2 | RESPIRATION RATE: 16 BRPM | SYSTOLIC BLOOD PRESSURE: 123 MMHG | WEIGHT: 176 LBS | OXYGEN SATURATION: 99 % | HEART RATE: 79 BPM

## 2019-01-08 DIAGNOSIS — Z79.4 TYPE 2 DIABETES MELLITUS WITHOUT COMPLICATION, WITH LONG-TERM CURRENT USE OF INSULIN (H): ICD-10-CM

## 2019-01-08 DIAGNOSIS — E55.9 VITAMIN D DEFICIENCY: Primary | ICD-10-CM

## 2019-01-08 DIAGNOSIS — E11.9 TYPE 2 DIABETES MELLITUS WITHOUT COMPLICATION, WITH LONG-TERM CURRENT USE OF INSULIN (H): ICD-10-CM

## 2019-01-08 DIAGNOSIS — I10 HYPERTENSION GOAL BP (BLOOD PRESSURE) < 130/80: ICD-10-CM

## 2019-01-08 DIAGNOSIS — E16.2 MULTIPLE EPISODES OF HYPOGLYCEMIA: ICD-10-CM

## 2019-01-08 RX ORDER — MULTIVIT-MIN/IRON/FOLIC ACID/K 18-600-40
CAPSULE ORAL DAILY
COMMUNITY
End: 2019-01-08 | Stop reason: ALTCHOICE

## 2019-01-08 RX ORDER — INSULIN GLARGINE 100 [IU]/ML
23 INJECTION, SOLUTION SUBCUTANEOUS 2 TIMES DAILY
Qty: 15 ML | Refills: 2 | Status: SHIPPED | OUTPATIENT
Start: 2019-01-08 | End: 2019-06-11

## 2019-01-08 SDOH — HEALTH STABILITY: PHYSICAL HEALTH: ON AVERAGE, HOW MANY DAYS PER WEEK DO YOU ENGAGE IN MODERATE TO STRENUOUS EXERCISE (LIKE A BRISK WALK)?: 5 DAYS

## 2019-01-08 ASSESSMENT — MIFFLIN-ST. JEOR: SCORE: 1581.01

## 2019-01-08 NOTE — PROGRESS NOTES
QUINN GARCIA Jumana Jeronimo is a 59 year old  who presents for   Chief Complaint   Patient presents with     RECHECK     Pt. presents to the clinic today for a follow up.    59 year-old male presents to clinic for follow up. Established care for DM and HTN 1 month ago. Taking all medications as ordered.  Had 1 episode of low blood sugar yesterday after did not eat for a long time.  Was 59; was not symptomatic with this and had not taken any medication. BS rebounded to 160 after eating and he resumed his medications as ordered. Generally BP is 90s to low 100s. Reports occasionally his BS is <80 in am, this happens about 3 times per month. Goal is .     Reports responding well to vitamin D therapy and aches in his legs and joints have decreased dramatically. He has taken 4 weeks of therapy and has 4 weeks remaining.         Problem, Medication and Allergy Lists were   reviewed and updated if needed.     Patient Active Problem List    Diagnosis Date Noted     Type 2 diabetes mellitus without complication (H) 10/15/2015     Priority: Medium     Pain in joint, shoulder region 03/11/2014     Priority: Medium     Impingement syndrome, shoulder 10/28/2013     Priority: Medium     Advanced directives, counseling/discussion 03/12/2013     Priority: Medium     Advance Care Planning:   ACP Review and Resources Provided:  Reviewed chart for advance care plan.  Chapo Cowan has no plan or code status on file. Discussed available resources and provided with information. Confirmed code status reflects current choices pending further ACP discussions.  Confirmed/documented designated decision maker(s). See permanent comments section of demographics in clinical tab.   Added by Eduar Shaw on 3/12/2013           Type 2 diabetes, HbA1C goal < 8% (H) 04/26/2012     Priority: Medium     CARDIOVASCULAR SCREENING; LDL GOAL LESS THAN 100 12/23/2011     Priority: Medium     Hypertension goal BP (blood pressure) <  130/80 12/23/2011     Priority: Medium     Hypercholesteremia 12/10/2010     Priority: Medium         Current Outpatient Medications   Medication Sig Dispense Refill     amLODIPine (NORVASC) 10 MG tablet Take 1 tablet (10 mg) by mouth daily 30 tablet 2     aspirin 81 MG chewable tablet Take 1 tablet (81 mg) by mouth daily 100 tablet 1     atorvastatin (LIPITOR) 10 MG tablet Take 4 tablets (40 mg) by mouth daily 90 tablet 1     blood glucose monitoring (ACCU-CHEK MULTICLIX) lancets Use to test blood sugar 2 x daily or as directed. 100 each 1     blood glucose monitoring (NO BRAND SPECIFIED) test strip Test twice daily. 2 Box 2     Blood Pressure Monitoring (PREMIUM AUTOMATIC BP MONITOR) CHIARA 1 Device daily 1 each 0     glucose (BD GLUCOSE) 5 g chewable tablet Take 2 tablets (10 g) by mouth as needed (prn blood sugar < 70) 15 tablet 0     insulin glargine (BASAGLAR KWIKPEN) 100 UNIT/ML pen Inject 23 Units Subcutaneous 2 times daily 15 mL 2     insulin pen needle (31G X 8 MM) 31G X 8 MM miscellaneous Use to inject insulin twice daily 100 each 0     liraglutide (VICTOZA) 18 MG/3ML solution Inject 1.2 mg Subcutaneous daily 6 mL 2     metFORMIN (GLUCOPHAGE) 500 MG tablet Take 2 tablets (1,000 mg) by mouth 2 times daily (with meals) 120 tablet 2     metoprolol tartrate (LOPRESSOR) 100 MG tablet Take 1 tablet (100 mg) by mouth 2 times daily 60 tablet 2     ranitidine (ZANTAC) 150 MG tablet Take 1 tablet (150 mg) by mouth 2 times daily 60 tablet 2     vitamin D2 (ERGOCALCIFEROL) 59492 units (1250 mcg) capsule Take 1 capsule (50,000 Units) by mouth every 7 days for 8 doses 8 capsule 0     vitamin D3 (CHOLECALCIFEROL) 1000 units (25 mcg) tablet Take 1 tablet (1,000 Units) by mouth daily 100 tablet 3     lisinopril (PRINIVIL/ZESTRIL) 40 MG tablet Take 1 tablet (40 mg) by mouth daily 90 tablet 1         Allergies   Allergen Reactions     Latex    .    Patient is   an established patient of this clinic.  Past Medical History:  "  Diagnosis Date     Diabetes mellitus (H)      GERD (gastroesophageal reflux disease)      Hypercholesteremia      Hypertension      Family History   Problem Relation Age of Onset     Diabetes Mother          late 50's     Diabetes Father          54, Heart attack     Eye Disorder Father      Heart Disease Father      Heart Disease Paternal Uncle      Social History     Socioeconomic History     Marital status: Single     Spouse name: None     Number of children: None     Years of education: None     Highest education level: None   Social Needs     Financial resource strain: None     Food insecurity - worry: None     Food insecurity - inability: None     Transportation needs - medical: None     Transportation needs - non-medical: None   Occupational History     Occupation: unemployed   Tobacco Use     Smoking status: Never Smoker     Smokeless tobacco: Former User     Types: Snuff   Substance and Sexual Activity     Alcohol use: No     Drug use: No     Sexual activity: Not Currently   Other Topics Concern     Parent/sibling w/ CABG, MI or angioplasty before 65F 55M? No   Social History Narrative    Currently unemployed. Lives in neighborhood        Walks most days.   .         Review of Systems:   Review of Systems  Gen: denies weight changes. Eating per usual. Does not like salt so does not salt food.   CV: negative for chest pain, irregular heart beat, peripheral edema  RESP: negative for SOB, cough  NEURO: negative for numbness and tingling in extremities.        Physical Exam:     Vitals:    19 1321   BP: 148/79   BP Location: Left arm   Patient Position: Chair   Cuff Size: Adult Regular   Pulse: 79   Resp: 16   Temp: 97.5  F (36.4  C)   TempSrc: Oral   SpO2: 99%   Weight: 79.8 kg (176 lb)   Height: 1.732 m (5' 8.2\")     Body mass index is 26.6 kg/m .  Vital signs normal except BP high initially. Recheck was 123/74     Physical Exam  GEN: alert, talkative, appropriate to " conversation  RESP: lungs clear to auscultation  CV: HRRR, S1, S2, no MRG. Negative peripheral edema.    Results:   No testing ordered today    Assessment and Plan        1. Vitamin D deficiency  -Plan to complete replacement dose of Vitamin D and then begin daily Vitamin D supplement of 1000 units per day. Patient verbalizes understanding.    vitamin D3 (CHOLECALCIFEROL) 1000 units (25 mcg) tablet; Take 1 tablet (1,000 Units) by mouth daily  Dispense: 100 tablet; Refill: 3    2. Type 2 diabetes mellitus without complication, with long-term current use of insulin (H)  Given 3 episodes of hypoglycemia per month, will reduce glargine insulin to 23 U twice daily. Patient prefers twice daily administration. Continue to check blood sugars every am and if experiences low blood sugar. Reviewed signs and symptoms of low blood sugar. Goal is .   - insulin glargine (BASAGLAR KWIKPEN) 100 UNIT/ML pen; Inject 23 Units Subcutaneous 2 times daily  Dispense: 15 mL; Refill: 2  Reinforced healthy living: regular walking, attention to diet.     3. Multiple episodes of hypoglycemia  As above.    Continue other regular meds.    Return to clinic early March: repeat A1c, add creatinine, micro albumin and lipids at that time. (Lipids due). Will notify of need to fast for next visit.   Patient consistently spilling protein in urine but Creatinine and eGFR acceptable levels.          There are no discontinued medications.    Options for treatment and follow-up care were reviewed with the patient. Phnai Jeronimo  engaged in the decision making process and verbalized understanding of the options discussed and agreed with the final plan.    RICARDO De Luna CNP

## 2019-01-08 NOTE — PATIENT INSTRUCTIONS
Please refill your once weekly vitamin D.  In one month, you will be done with those pills.  After that, please buy vitamin D 1000 units from the pharmacy and take one pill daily.    Please decrease your insulin glargine to 23 units twice daily. Reordered and sent to pharmacy.     Return to clinic in early March for recheck of 3 month blood sugar average and recheck of vitamin D level.     Continue to check your blood sugar every morning and let us know if it is getting too high. Your goal Blood Sugar is .    Please check your blood pressure at home also. Goal BP is 120/80.

## 2019-01-08 NOTE — NURSING NOTE
"59 year old  Chief Complaint   Patient presents with     RECHECK     Pt. presents to the clinic today for a follow up.        Blood pressure 148/79, pulse 79, temperature 97.5  F (36.4  C), temperature source Oral, resp. rate 16, height 1.732 m (5' 8.2\"), weight 79.8 kg (176 lb), SpO2 99 %. Body mass index is 26.6 kg/m .  BP completed using cuff size:    Patient Active Problem List   Diagnosis     Hypercholesteremia     CARDIOVASCULAR SCREENING; LDL GOAL LESS THAN 100     Hypertension goal BP (blood pressure) < 130/80     Type 2 diabetes, HbA1C goal < 8% (H)     Advanced directives, counseling/discussion     Impingement syndrome, shoulder     Pain in joint, shoulder region     Type 2 diabetes mellitus without complication (H)       Wt Readings from Last 2 Encounters:   01/08/19 79.8 kg (176 lb)   12/04/18 78.9 kg (174 lb)     BP Readings from Last 3 Encounters:   01/08/19 148/79   12/04/18 149/76   11/20/18 159/90       Allergies   Allergen Reactions     Latex        Current Outpatient Medications   Medication     amLODIPine (NORVASC) 10 MG tablet     aspirin 81 MG chewable tablet     atorvastatin (LIPITOR) 10 MG tablet     blood glucose monitoring (ACCU-CHEK MULTICLIX) lancets     blood glucose monitoring (NO BRAND SPECIFIED) test strip     Blood Pressure Monitoring (PREMIUM AUTOMATIC BP MONITOR) CHIARA     Cholecalciferol (VITAMIN D) 2000 units CAPS     glucose (BD GLUCOSE) 5 g chewable tablet     insulin glargine (BASAGLAR KWIKPEN) 100 UNIT/ML pen     insulin pen needle (31G X 8 MM) 31G X 8 MM miscellaneous     liraglutide (VICTOZA) 18 MG/3ML solution     metFORMIN (GLUCOPHAGE) 500 MG tablet     metoprolol tartrate (LOPRESSOR) 100 MG tablet     ranitidine (ZANTAC) 150 MG tablet     vitamin D2 (ERGOCALCIFEROL) 52364 units (1250 mcg) capsule     lisinopril (PRINIVIL/ZESTRIL) 40 MG tablet     No current facility-administered medications for this visit.        Social History     Tobacco Use     Smoking status: Never " Smoker     Smokeless tobacco: Former User     Types: Snuff   Substance Use Topics     Alcohol use: No     Drug use: No         Honoring Choices - Health Care Directive Guide offered to patient at time of visit.    Health Maintenance Due   Topic Date Due     HIV SCREEN (SYSTEM ASSIGNED)  01/01/1976     HEPATITIS C SCREENING  01/01/1976     COLON CANCER SCREEN (SYSTEM ASSIGNED)  01/01/2008     ZOSTER IMMUNIZATION (1 of 2) 01/01/2008     LIPID MONITORING Q1 YEAR  06/06/2015     FOOT EXAM Q1 YEAR  06/12/2015     EYE EXAM Q1 YEAR  06/20/2015     ADVANCE DIRECTIVE PLANNING Q5 YRS  03/12/2018       Immunization History   Administered Date(s) Administered     DTAP (<7y) 04/02/2003, 05/13/2003     DTaP, Unspecified 12/23/2011     FLU 6-35 months 12/09/2004     Flu, Unspecified 12/09/2004     Influenza (IIV3) PF 12/23/2011     Influenza Vaccine IM 3yrs+ 4 Valent IIV4 12/04/2018     MMR 04/02/2003, 05/13/2003     Pneumococcal 23 valent 12/23/2011     TDAP Vaccine (Adacel) 12/23/2011     Td (Adult), Adsorbed 04/02/2003, 05/13/2003     Tdap (Adult) Unspecified Formulation 12/23/2011       No results found for: PAP      Recent Labs   Lab Test 11/20/18  1455 11/20/18  1426 06/06/14  0945 02/18/14  0826 01/08/14  1521 12/09/13  0944   A1C 7.0*  --  8.8* 9.1*  --  9.7*   LDL  --   --  174* 110  --  128   HDL  --   --  31* 35*  --  30*   TRIG  --   --  247* 192*  --  155*   ALT  --  24 28  --  30  --    CR  --  0.88 0.83  --  0.88  --    GFRESTIMATED  --  88 >90  --  90  --    GFRESTBLACK  --  >90 >90  --  >90  --    ALBUMIN  --  4.3 4.1  --  4.1  --    POTASSIUM  --  4.5 4.4  --  4.3  --    TSH  --  1.83  --   --  1.83  --        PHQ-2 ( 1999 Pfizer) 12/4/2018 11/20/2018   Q1: Little interest or pleasure in doing things 0 0   Q2: Feeling down, depressed or hopeless 0 1   PHQ-2 Score 0 1       PHQ-9 SCORE 11/20/2018   PHQ-9 Total Score 4       SAURAV-7 SCORE 11/20/2018   Total Score 6       No flowsheet data found.    Vannesa Nolen,  CMA  January 8, 2019 1:23 PM

## 2019-01-09 NOTE — PROGRESS NOTES
"SUBJECTIVE: Phani is a 59 year old male who presents for follow up.     Diabetes: Phani feels things are going well. Continues to take insulin glargine 25 units BID, liraglutide, and metformin.  Notes that fasting blood sugars generally range , but has experienced fasting blood sugars of less than 80 2-3 times in the past month. Yesterday, he had a blood glucose of 61 in the afternoon with no symptoms. He does note that he went for a while without eating yesterday and thinks this may have been the cause of the BG.    HTN: Notes that home blood pressures are generally at goal.  Systolic of 120, 116.    Vitamin D: Phani is very happy with his vitamin D supplement. He feels it has helped him to feel better - particularly leg aches.    Confirms he is still taking all of his medications.    Environmental factors that impact patient: Phani finds himself walking frequently. Jose is a second language, but his English proficiency is strong.      Patient Active Problem List   Diagnosis     Hypercholesteremia     CARDIOVASCULAR SCREENING; LDL GOAL LESS THAN 100     Hypertension goal BP (blood pressure) < 130/80     Type 2 diabetes, HbA1C goal < 8% (H)     Advanced directives, counseling/discussion     Impingement syndrome, shoulder     Pain in joint, shoulder region     Type 2 diabetes mellitus without complication (H)     Vitamin D deficiency        OBJECTIVE:     SAURAV-7 SCORE 11/20/2018   Total Score 6       PHQ-9 SCORE 11/20/2018   PHQ-9 Total Score 4         VITALS:  BP Readings from Last 3 Encounters:   01/08/19 123/74   12/04/18 149/76   11/20/18 159/90           Weight:   Wt Readings from Last 1 Encounters:   01/08/19 176 lb (79.8 kg)       Height:   Ht Readings from Last 1 Encounters:   01/08/19 5' 8.2\" (173.2 cm)       LABORATORY VALUES:   Last A1C:    Lab Results   Component Value Date    A1C 7.0 11/20/2018   .    Last Basic Metabolic Panel:  Lab Results   Component Value Date     11/20/2018      Lab " Results   Component Value Date    POTASSIUM 4.5 11/20/2018     Lab Results   Component Value Date    CHLORIDE 108 11/20/2018     Lab Results   Component Value Date    LIU 9.3 11/20/2018     Lab Results   Component Value Date    CO2 25 11/20/2018     Lab Results   Component Value Date    BUN 15 11/20/2018     Lab Results   Component Value Date    CR 0.88 11/20/2018     Lab Results   Component Value Date    GLC 70 11/20/2018       Lipid Panel Labs  Lab Results   Component Value Date    CHOL 255 06/06/2014    CHOL 183 02/18/2014     Lab Results   Component Value Date    TRIG 247 06/06/2014    TRIG 192 02/18/2014     Lab Results   Component Value Date    HDL 31 06/06/2014    HDL 35 02/18/2014     Lab Results   Component Value Date     06/06/2014     02/18/2014       Hepatic Panel Labs  Lab Results   Component Value Date    AST 13 11/20/2018     Lab Results   Component Value Date    ALT 24 11/20/2018         SOCIAL AND FAMILY HISTORY  Social History     Tobacco Use     Smoking status: Never Smoker     Smokeless tobacco: Former User     Types: Snuff   Substance Use Topics     Alcohol use: No    .  Most Recent Immunizations   Administered Date(s) Administered     DTAP (<7y) 05/13/2003     DTaP, Unspecified 12/23/2011     FLU 6-35 months 12/09/2004     Flu, Unspecified 12/09/2004     Influenza (IIV3) PF 12/23/2011     Influenza Vaccine IM 3yrs+ 4 Valent IIV4 12/04/2018     MMR 05/13/2003     Pneumococcal 23 valent 12/23/2011     TDAP Vaccine (Adacel) 12/23/2011     Td (Adult), Adsorbed 05/13/2003     Tdap (Adult) Unspecified Formulation 12/23/2011       CURRENT MEDICATIONS:   Current Outpatient Medications   Medication Sig Dispense Refill     amLODIPine (NORVASC) 10 MG tablet Take 1 tablet (10 mg) by mouth daily 30 tablet 2     aspirin 81 MG chewable tablet Take 1 tablet (81 mg) by mouth daily 100 tablet 1     atorvastatin (LIPITOR) 10 MG tablet Take 4 tablets (40 mg) by mouth daily 90 tablet 1     blood  glucose monitoring (ACCU-CHEK MULTICLIX) lancets Use to test blood sugar 2 x daily or as directed. 100 each 1     blood glucose monitoring (NO BRAND SPECIFIED) test strip Test twice daily. 2 Box 2     Blood Pressure Monitoring (PREMIUM AUTOMATIC BP MONITOR) CHIARA 1 Device daily 1 each 0     glucose (BD GLUCOSE) 5 g chewable tablet Take 2 tablets (10 g) by mouth as needed (prn blood sugar < 70) 15 tablet 0     insulin glargine (BASAGLAR KWIKPEN) 100 UNIT/ML pen Inject 23 Units Subcutaneous 2 times daily 15 mL 2     insulin pen needle (31G X 8 MM) 31G X 8 MM miscellaneous Use to inject insulin twice daily 100 each 0     liraglutide (VICTOZA) 18 MG/3ML solution Inject 1.2 mg Subcutaneous daily 6 mL 2     lisinopril (PRINIVIL/ZESTRIL) 40 MG tablet Take 1 tablet (40 mg) by mouth daily 90 tablet 1     metFORMIN (GLUCOPHAGE) 500 MG tablet Take 2 tablets (1,000 mg) by mouth 2 times daily (with meals) 120 tablet 2     metoprolol tartrate (LOPRESSOR) 100 MG tablet Take 1 tablet (100 mg) by mouth 2 times daily 60 tablet 2     ranitidine (ZANTAC) 150 MG tablet Take 1 tablet (150 mg) by mouth 2 times daily 60 tablet 2     vitamin D2 (ERGOCALCIFEROL) 50047 units (1250 mcg) capsule Take 1 capsule (50,000 Units) by mouth every 7 days for 8 doses 8 capsule 0     vitamin D3 (CHOLECALCIFEROL) 1000 units (25 mcg) tablet Take 1 tablet (1,000 Units) by mouth daily 100 tablet 3       ALLERGIES:     Allergies   Allergen Reactions     Latex           ASSESSMENT:    Diabetes:  At goal based on recent A1c and self-reported BG. However, Phani is experiencing some lower BG readings, some without clear cause and it is concerning that his is asymptomatic at times, such as yesterday.  Due to these reasons, it would be best to slightly decrease insulin dose.  DTP: Dose too high    HTN: At goal based on home readings and repeat reading in clinic today.    Vitamin D: being replaced appropriately. It will be best for Phani to switch to a daily vitamin D  replacement after he finishes his once weekly prescription.  DTP:  Needs additional therapy    All medications were reviewed and found to be indicated, effective, safe and convenient unless drug therapy problem identified as described above.    PLAN:   - Decrease insulin glargine to 23 units BID  - Recommended to initiate cholecalciferol 1000 units once daily after he has finished his once weekly vitamin D replacement  - F/U in two months for repeat A1c and vitamin D level.    Options for treatment and/or follow-up care were reviewed with the patient. Phani Jeronimo was engaged and actively involved in the decision making process. He/She verbalized understanding of the options discussed and was satisfied with the final plan.    Patient was provided with written instructions/medication list via AVS.       Medical conditions reviewed: 3    Medications reviewed: 13    DTP identified: 2    Time spent: 30 minutes    Level of service:3

## 2019-01-12 DIAGNOSIS — Z79.4 TYPE 2 DIABETES MELLITUS WITHOUT COMPLICATION, WITH LONG-TERM CURRENT USE OF INSULIN (H): ICD-10-CM

## 2019-01-12 DIAGNOSIS — E11.9 TYPE 2 DIABETES MELLITUS WITHOUT COMPLICATION, WITH LONG-TERM CURRENT USE OF INSULIN (H): ICD-10-CM

## 2019-01-12 DIAGNOSIS — I10 BENIGN ESSENTIAL HYPERTENSION: ICD-10-CM

## 2019-01-14 NOTE — TELEPHONE ENCOUNTER
"Requested Prescriptions   Refused Prescriptions Disp Refills     blood glucose monitoring (ACCU-CHEK MULTICLIX) lancets 100 each 1    Last Written Prescription Date:  2018  Last Fill Quantity: 100,  # refills: 1   Last office visit: No previous visit found with prescribing provider:  219   Future Office Visit:   Sig: Use to test blood sugar 2 x daily or as directed.    Diabetic Supplies Protocol Passed - 2019 11:12 AM       Passed - Medication is active on med list       Passed - Patient is 18 years of age or older       Passed - Recent (6 mo) or future (30 days) visit within the authorizing provider's specialty    Patient had office visit in the last 6 months or has a visit in the next 30 days with authorizing provider.  See \"Patient Info\" tab in inbasket, or \"Choose Columns\" in Meds & Orders section of the refill encounter.            blood glucose (NO BRAND SPECIFIED) test strip 2 Box 2    Last Written Prescription Date:  2018  Last Fill Quantity: 2,  # refills: 2   Last office visit: No previous visit found with prescribing provider:  2019   Future Office Visit:   Sig: Test twice daily.    Diabetic Supplies Protocol Passed - 2019 11:12 AM       Passed - Medication is active on med list       Passed - Patient is 18 years of age or older       Passed - Recent (6 mo) or future (30 days) visit within the authorizing provider's specialty    Patient had office visit in the last 6 months or has a visit in the next 30 days with authorizing provider.  See \"Patient Info\" tab in inbasket, or \"Choose Columns\" in Meds & Orders section of the refill encounter.            Blood Pressure Monitoring (PREMIUM AUTOMATIC BP MONITOR) CHIARA 1 each 0    Last Written Prescription Date:  2018  Last Fill Quantity: 1,  # refills: 0   Last office visit: No previous visit found with prescribing provider:  2019   Future Office Visit:   Si Device daily    There is no refill protocol " "information for this order        insulin pen needle (31G X 8 MM) 31G X 8 MM miscellaneous 100 each 0    Last Written Prescription Date:  11/20/2018  Last Fill Quantity: 100,  # refills: 0   Last office visit: No previous visit found with prescribing provider:  01/08/2019   Future Office Visit:   Sig: Use to inject insulin twice daily    Diabetic Supplies Protocol Passed - 1/12/2019 11:12 AM       Passed - Medication is active on med list       Passed - Patient is 18 years of age or older       Passed - Recent (6 mo) or future (30 days) visit within the authorizing provider's specialty    Patient had office visit in the last 6 months or has a visit in the next 30 days with authorizing provider.  See \"Patient Info\" tab in inbasket, or \"Choose Columns\" in Meds & Orders section of the refill encounter.            "

## 2019-01-26 DIAGNOSIS — Z79.4 TYPE 2 DIABETES MELLITUS WITHOUT COMPLICATION, WITH LONG-TERM CURRENT USE OF INSULIN (H): ICD-10-CM

## 2019-01-26 DIAGNOSIS — E11.9 TYPE 2 DIABETES MELLITUS WITHOUT COMPLICATION, WITH LONG-TERM CURRENT USE OF INSULIN (H): ICD-10-CM

## 2019-02-26 DIAGNOSIS — E11.9 TYPE 2 DIABETES MELLITUS WITHOUT COMPLICATION, WITH LONG-TERM CURRENT USE OF INSULIN (H): ICD-10-CM

## 2019-02-26 DIAGNOSIS — E78.5 HYPERLIPIDEMIA LDL GOAL <100: ICD-10-CM

## 2019-02-26 DIAGNOSIS — Z79.4 TYPE 2 DIABETES MELLITUS WITHOUT COMPLICATION, WITH LONG-TERM CURRENT USE OF INSULIN (H): ICD-10-CM

## 2019-02-26 RX ORDER — ATORVASTATIN CALCIUM 10 MG/1
40 TABLET, FILM COATED ORAL DAILY
Qty: 90 TABLET | Refills: 1 | Status: SHIPPED | OUTPATIENT
Start: 2019-02-26 | End: 2019-03-01 | Stop reason: DRUGHIGH

## 2019-02-27 ENCOUNTER — TELEPHONE (OUTPATIENT)
Dept: FAMILY MEDICINE | Facility: CLINIC | Age: 59
End: 2019-02-27

## 2019-02-27 NOTE — TELEPHONE ENCOUNTER
[facility] is calling requesting a callback. They would like clarity on the Lipitor 10 mg. They can be reached at the incoming number.

## 2019-03-01 DIAGNOSIS — E78.5 HYPERLIPIDEMIA LDL GOAL <100: Primary | ICD-10-CM

## 2019-03-01 RX ORDER — ATORVASTATIN CALCIUM 40 MG/1
40 TABLET, FILM COATED ORAL DAILY
Qty: 90 TABLET | Refills: 1 | Status: SHIPPED | OUTPATIENT
Start: 2019-03-01 | End: 2019-08-06

## 2019-03-07 NOTE — TELEPHONE ENCOUNTER
Patient is calling requesting a refill on all his medications. He said he was out of all of them and needs them today. I informed patient that he missed his appointment on 3/5/19 and offered him a new appointment. Patient is scheduled to come in on 3/12/19. He can be reached at the incoming number.

## 2019-03-12 ENCOUNTER — OFFICE VISIT (OUTPATIENT)
Dept: FAMILY MEDICINE | Facility: CLINIC | Age: 59
End: 2019-03-12
Payer: COMMERCIAL

## 2019-03-12 ENCOUNTER — OFFICE VISIT (OUTPATIENT)
Dept: PHARMACY | Facility: CLINIC | Age: 59
End: 2019-03-12
Payer: COMMERCIAL

## 2019-03-12 VITALS
TEMPERATURE: 98.1 F | BODY MASS INDEX: 26.52 KG/M2 | DIASTOLIC BLOOD PRESSURE: 72 MMHG | HEIGHT: 68 IN | SYSTOLIC BLOOD PRESSURE: 116 MMHG | WEIGHT: 175 LBS | HEART RATE: 90 BPM | OXYGEN SATURATION: 98 % | RESPIRATION RATE: 16 BRPM

## 2019-03-12 DIAGNOSIS — I10 BENIGN ESSENTIAL HYPERTENSION: ICD-10-CM

## 2019-03-12 DIAGNOSIS — E55.9 VITAMIN D DEFICIENCY: ICD-10-CM

## 2019-03-12 DIAGNOSIS — K21.00 GASTROESOPHAGEAL REFLUX DISEASE WITH ESOPHAGITIS: ICD-10-CM

## 2019-03-12 DIAGNOSIS — Z79.4 TYPE 2 DIABETES MELLITUS WITHOUT COMPLICATION, WITH LONG-TERM CURRENT USE OF INSULIN (H): ICD-10-CM

## 2019-03-12 DIAGNOSIS — E11.9 TYPE 2 DIABETES MELLITUS WITHOUT COMPLICATION, WITH LONG-TERM CURRENT USE OF INSULIN (H): Primary | ICD-10-CM

## 2019-03-12 DIAGNOSIS — E11.9 TYPE 2 DIABETES MELLITUS WITHOUT COMPLICATION, WITH LONG-TERM CURRENT USE OF INSULIN (H): ICD-10-CM

## 2019-03-12 DIAGNOSIS — E78.5 HYPERLIPIDEMIA LDL GOAL <100: Primary | ICD-10-CM

## 2019-03-12 DIAGNOSIS — Z79.4 TYPE 2 DIABETES MELLITUS WITHOUT COMPLICATION, WITH LONG-TERM CURRENT USE OF INSULIN (H): Primary | ICD-10-CM

## 2019-03-12 LAB
CHOLEST SERPL-MCNC: 148 MG/DL
CREAT UR-MCNC: 325 MG/DL
HBA1C MFR BLD: 7.7 % (ref 4.1–5.7)
HDLC SERPL-MCNC: 38 MG/DL
LDLC SERPL CALC-MCNC: 90 MG/DL
MICROALBUMIN UR-MCNC: 61 MG/L
MICROALBUMIN/CREAT UR: 18.86 MG/G CR (ref 0–17)
NONHDLC SERPL-MCNC: 110 MG/DL
TRIGL SERPL-MCNC: 102 MG/DL

## 2019-03-12 RX ORDER — LIRAGLUTIDE 6 MG/ML
1.2 INJECTION SUBCUTANEOUS DAILY
Qty: 6 ML | Refills: 2 | Status: SHIPPED | OUTPATIENT
Start: 2019-03-12 | End: 2019-06-11

## 2019-03-12 RX ORDER — AMLODIPINE BESYLATE 10 MG/1
10 TABLET ORAL DAILY
Qty: 90 TABLET | Refills: 1 | Status: SHIPPED | OUTPATIENT
Start: 2019-03-12 | End: 2019-03-13

## 2019-03-12 ASSESSMENT — MIFFLIN-ST. JEOR: SCORE: 1570.12

## 2019-03-12 NOTE — PATIENT INSTRUCTIONS
DM  A1c today  Refilled Victoza and metformin. Continue current meds. Let us know if low BS  Continue good work on diet and weight control  Follow up in 3 months  Let pharmacy know when need refills  Make sure they understand I am your primary so the refill requests come to me.     Vitamin D  Responding well to vitamin D. Check level today. Continue current daily dose    Hyperlipidemia  Lipid levels today

## 2019-03-12 NOTE — PROGRESS NOTES
QUINN GARCIA Jumana Jeronimo is a 59 year old  who presents for   Chief Complaint   Patient presents with     Recheck Medication     Pt. presents to the clinic today for a medication refills on all of his medications.      59 year-old male with history DM 2, hyperlipidemia and vitamin D deficiency presents to the clinic for follow up. Taking all medications as directed, although indicates does not always use metoprolol twice daily.    1. DM: blood sugars range from 79 -120.  Reports being aware when BS dips to 79; does feel a little shaky and sweaty, but happens very rarely. Generally feels well. Is not walking as much this winter due to snow and ice.    2. Hyperlipidemia. Is fasting today, so could obtain lipids. Continues to work on diet.   3. Med refill. There has been a problem with med refills.  Most of refill requests have gone to different provider and only 2 were forwarded to me which were refilled.  This has been frustrating for patient.    4. Muscle aches, mild skin rashes lower extremities. These have resolved since beginning vitamin D    Problem, Medication and Allergy Lists were   reviewed and updated if needed.     Patient Active Problem List    Diagnosis Date Noted     Vitamin D deficiency 01/08/2019     Priority: Medium     Type 2 diabetes mellitus without complication (H) 10/15/2015     Priority: Medium     Pain in joint, shoulder region 03/11/2014     Priority: Medium     Impingement syndrome, shoulder 10/28/2013     Priority: Medium     Advanced directives, counseling/discussion 03/12/2013     Priority: Medium     Advance Care Planning:   ACP Review and Resources Provided:  Reviewed chart for advance care plan.  Chapovivian Cowan has no plan or code status on file. Discussed available resources and provided with information. Confirmed code status reflects current choices pending further ACP discussions.  Confirmed/documented designated decision maker(s). See permanent comments section of  demographics in clinical tab.   Added by Eduar Shaw on 3/12/2013           Type 2 diabetes, HbA1C goal < 8% (H) 04/26/2012     Priority: Medium     CARDIOVASCULAR SCREENING; LDL GOAL LESS THAN 100 12/23/2011     Priority: Medium     Hypertension goal BP (blood pressure) < 130/80 12/23/2011     Priority: Medium     Hypercholesteremia 12/10/2010     Priority: Medium         Current Outpatient Medications   Medication Sig Dispense Refill     amLODIPine (NORVASC) 10 MG tablet Take 1 tablet (10 mg) by mouth daily 90 tablet 1     aspirin 81 MG chewable tablet Take 1 tablet (81 mg) by mouth daily 100 tablet 1     atorvastatin (LIPITOR) 40 MG tablet Take 1 tablet (40 mg) by mouth daily 90 tablet 1     blood glucose monitoring (ACCU-CHEK MULTICLIX) lancets Use to test blood sugar 2 x daily or as directed. 100 each 1     blood glucose monitoring (NO BRAND SPECIFIED) test strip Test twice daily. 2 Box 2     Blood Pressure Monitoring (PREMIUM AUTOMATIC BP MONITOR) CHIARA 1 Device daily 1 each 0     glucose (BD GLUCOSE) 5 g chewable tablet Take 2 tablets (10 g) by mouth as needed (prn blood sugar < 70) 15 tablet 0     insulin glargine (BASAGLAR KWIKPEN) 100 UNIT/ML pen Inject 23 Units Subcutaneous 2 times daily 15 mL 2     insulin pen needle (31G X 8 MM) 31G X 8 MM miscellaneous Use to inject insulin twice daily 100 each 0     liraglutide (VICTOZA) 18 MG/3ML solution Inject 1.2 mg Subcutaneous daily 6 mL 2     lisinopril (PRINIVIL/ZESTRIL) 40 MG tablet Take 1 tablet (40 mg) by mouth daily 90 tablet 1     metFORMIN (GLUCOPHAGE) 500 MG tablet Take 2 tablets (1,000 mg) by mouth 2 times daily (with meals) 120 tablet 2     metoprolol tartrate (LOPRESSOR) 100 MG tablet Take 1 tablet (100 mg) by mouth 2 times daily 60 tablet 2     ranitidine (ZANTAC) 150 MG tablet Take 1 tablet (150 mg) by mouth 2 times daily 60 tablet 2     vitamin D3 (CHOLECALCIFEROL) 1000 units (25 mcg) tablet Take 1 tablet (1,000 Units) by mouth daily 100 tablet  3         Allergies   Allergen Reactions     Latex    .    Patient is   an established patient of this clinic.  Past Medical History:   Diagnosis Date     Diabetes mellitus (H)      GERD (gastroesophageal reflux disease)      Hypercholesteremia      Hypertension      Family History   Problem Relation Age of Onset     Diabetes Mother          late 50's     Diabetes Father          54, Heart attack     Eye Disorder Father      Heart Disease Father      Heart Disease Paternal Uncle      Social History     Socioeconomic History     Marital status: Single     Spouse name: None     Number of children: None     Years of education: None     Highest education level: None   Occupational History     Occupation: unemployed   Social Needs     Financial resource strain: None     Food insecurity:     Worry: None     Inability: None     Transportation needs:     Medical: None     Non-medical: None   Tobacco Use     Smoking status: Never Smoker     Smokeless tobacco: Former User     Types: Snuff   Substance and Sexual Activity     Alcohol use: No     Drug use: No     Sexual activity: Not Currently   Lifestyle     Physical activity:     Days per week: 5 days     Minutes per session: None     Stress: None   Relationships     Social connections:     Talks on phone: None     Gets together: None     Attends Religion service: None     Active member of club or organization: None     Attends meetings of clubs or organizations: None     Relationship status: None     Intimate partner violence:     Fear of current or ex partner: None     Emotionally abused: None     Physically abused: None     Forced sexual activity: None   Other Topics Concern     Parent/sibling w/ CABG, MI or angioplasty before 65F 55M? No   Social History Narrative    Currently unemployed. Lives in neighborhood        Walks most days.   .         Review of Systems:   Review of Systems  GEN: denies weight gain. Feeling well overall  HEENT: has eye  "appointment in 2 months  RESP: negative for SOB  CV: Negative for chest pain, irregular heart beat, peripheral edema  SKIN: has had history of dry scaly rash on LE bilaterally but this has cleared since starting vitamin D therapy by report.   Feet: denies open areas, callouses  Neuro: has had burning pain in feet in the past, but gone now. Denies numbness or tingling.   ENDO: BS as above. Denies increased thirst, urination       Physical Exam:     Vitals:    03/12/19 1114   BP: 116/72   BP Location: Left arm   Patient Position: Chair   Cuff Size: Adult Regular   Pulse: 90   Resp: 16   Temp: 98.1  F (36.7  C)   TempSrc: Oral   SpO2: 98%   Weight: 79.4 kg (175 lb)   Height: 1.722 m (5' 7.8\")     Body mass index is 26.77 kg/m .  Vitals were reviewed and were normal     Physical Exam  GEN: Alert male, talkative in no acute distress  HEENT: Eyes clear, STACIE, EOM intact  RESP: Lungs clear to auscultation  CV: HRRR, S1, s2, no MRG  Extremities: Skin LE intact, no varicosities or discoloration  Pedal pulses +2 bilaterally, sensation intact.     Results:      Results from this visit  Results for orders placed or performed in visit on 03/12/19   Hemoglobin A1c ([facility])   Result Value Ref Range    Hemoglobin A1C 7.7 (H) 4.1 - 5.7 %       Assessment and Plan        1. Benign essential hypertension  Refilled medications. Has lisinopril, will notify pharmacy when needs refill.   - amLODIPine (NORVASC) 10 MG tablet; Take 1 tablet (10 mg) by mouth daily  Dispense: 90 tablet; Refill: 1  Reviewed use of all medications    2. Type 2 diabetes mellitus without complication, with long-term current use of insulin (H); history of reduced eGFR  Clarified with pharmacy that Victoza will require prior authorization. Requested Pharm D talk with patient about medications. Later notified that Victoza does not need prior authorization. Will monitor reported rare episodes of hypoglycemia for now. No changes to regimen. A1c 7.7 Reinforced " diet and exercise. Patient indicates will begin to walk again when sidewalks are clear.   - liraglutide (VICTOZA) 18 MG/3ML solution; Inject 1.2 mg Subcutaneous daily  Dispense: 6 mL; Refill: 2  - metFORMIN (GLUCOPHAGE) 500 MG tablet; Take 2 tablets (1,000 mg) by mouth 2 times daily (with meals)  Dispense: 120 tablet; Refill: 2  - Albumin Random Urine Quantitative with Creat Ratio  - Hemoglobin A1c ([facility])    3. Gastroesophageal reflux disease with esophagitis  Refilled:   - ranitidine (ZANTAC) 150 MG tablet; Take 1 tablet (150 mg) by mouth 2 times daily  Dispense: 60 tablet; Refill: 2  Uses on prn basis.    4. Hyperlipidemia LDL goal <100  Reviewed diet and exercise recommendations  - Lipid panel reflex to direct LDL Fasting    5. Vitamin D deficiency    - Vitamin D Level  Continue daily vitamin D       Medications Discontinued During This Encounter   Medication Reason     amLODIPine (NORVASC) 10 MG tablet Reorder     liraglutide (VICTOZA) 18 MG/3ML solution Reorder     metFORMIN (GLUCOPHAGE) 500 MG tablet Reorder     ranitidine (ZANTAC) 150 MG tablet Reorder     Discussed confusion with refills. Appears all med refill requests were going to different provider, with only a few forwarded on to me. Primary provider was changed to me in EHR and patient given card. When he calls for refills, he will verify that his primary provider is me to assure refill requests go to the right place.  He verbalizes understanding.   Options for treatment and follow-up care were reviewed with the patient. Phani Jeronimo  engaged in the decision making process and verbalized understanding of the options discussed and agreed with the final plan.    Leah Ford, RICARDO CNP

## 2019-03-12 NOTE — NURSING NOTE
"59 year old  Chief Complaint   Patient presents with     Recheck Medication     Pt. presents to the clinic today for a medication refills on all of his medications.        Blood pressure 116/72, pulse 90, temperature 98.1  F (36.7  C), temperature source Oral, resp. rate 16, height 1.722 m (5' 7.8\"), weight 79.4 kg (175 lb), SpO2 98 %. Body mass index is 26.77 kg/m .  BP completed using cuff size:    Patient Active Problem List   Diagnosis     Hypercholesteremia     CARDIOVASCULAR SCREENING; LDL GOAL LESS THAN 100     Hypertension goal BP (blood pressure) < 130/80     Type 2 diabetes, HbA1C goal < 8% (H)     Advanced directives, counseling/discussion     Impingement syndrome, shoulder     Pain in joint, shoulder region     Type 2 diabetes mellitus without complication (H)     Vitamin D deficiency       Wt Readings from Last 2 Encounters:   03/12/19 79.4 kg (175 lb)   01/08/19 79.8 kg (176 lb)     BP Readings from Last 3 Encounters:   03/12/19 116/72   01/08/19 123/74   12/04/18 149/76       Allergies   Allergen Reactions     Latex        Current Outpatient Medications   Medication     amLODIPine (NORVASC) 10 MG tablet     aspirin 81 MG chewable tablet     atorvastatin (LIPITOR) 40 MG tablet     blood glucose monitoring (ACCU-CHEK MULTICLIX) lancets     blood glucose monitoring (NO BRAND SPECIFIED) test strip     Blood Pressure Monitoring (PREMIUM AUTOMATIC BP MONITOR) CHIARA     glucose (BD GLUCOSE) 5 g chewable tablet     insulin glargine (BASAGLAR KWIKPEN) 100 UNIT/ML pen     insulin pen needle (31G X 8 MM) 31G X 8 MM miscellaneous     liraglutide (VICTOZA) 18 MG/3ML solution     lisinopril (PRINIVIL/ZESTRIL) 40 MG tablet     metFORMIN (GLUCOPHAGE) 500 MG tablet     metoprolol tartrate (LOPRESSOR) 100 MG tablet     ranitidine (ZANTAC) 150 MG tablet     vitamin D3 (CHOLECALCIFEROL) 1000 units (25 mcg) tablet     No current facility-administered medications for this visit.        Social History     Tobacco Use     " Smoking status: Never Smoker     Smokeless tobacco: Former User     Types: Snuff   Substance Use Topics     Alcohol use: No     Drug use: No       Honoring Choices - Health Care Directive Guide offered to patient at time of visit.    Health Maintenance Due   Topic Date Due     HIV SCREEN (SYSTEM ASSIGNED)  01/01/1976     HEPATITIS C SCREENING  01/01/1976     COLON CANCER SCREEN (SYSTEM ASSIGNED)  01/01/2008     ZOSTER IMMUNIZATION (1 of 2) 01/01/2008     PREVENTIVE CARE VISIT  03/12/2014     LIPID MONITORING Q1 YEAR  06/06/2015     FOOT EXAM Q1 YEAR  06/12/2015     EYE EXAM Q1 YEAR  06/20/2015     ADVANCE DIRECTIVE PLANNING Q5 YRS  03/12/2018       Immunization History   Administered Date(s) Administered     DTAP (<7y) 04/02/2003, 05/13/2003     DTaP, Unspecified 12/23/2011     FLU 6-35 months 12/09/2004     Flu, Unspecified 12/09/2004     Influenza (IIV3) PF 12/23/2011     Influenza Vaccine IM 3yrs+ 4 Valent IIV4 12/04/2018     MMR 04/02/2003, 05/13/2003     Pneumococcal 23 valent 12/23/2011     TDAP Vaccine (Adacel) 12/23/2011     Td (Adult), Adsorbed 04/02/2003, 05/13/2003     Tdap (Adult) Unspecified Formulation 12/23/2011       No results found for: PAP      Recent Labs   Lab Test 11/20/18  1455 11/20/18  1426 06/06/14  0945 02/18/14  0826 01/08/14  1521 12/09/13  0944   A1C 7.0*  --  8.8* 9.1*  --  9.7*   LDL  --   --  174* 110  --  128   HDL  --   --  31* 35*  --  30*   TRIG  --   --  247* 192*  --  155*   ALT  --  24 28  --  30  --    CR  --  0.88 0.83  --  0.88  --    GFRESTIMATED  --  88 >90  --  90  --    GFRESTBLACK  --  >90 >90  --  >90  --    ALBUMIN  --  4.3 4.1  --  4.1  --    POTASSIUM  --  4.5 4.4  --  4.3  --    TSH  --  1.83  --   --  1.83  --        PHQ-2 ( 1999 Pfizer) 12/4/2018 11/20/2018   Q1: Little interest or pleasure in doing things 0 0   Q2: Feeling down, depressed or hopeless 0 1   PHQ-2 Score 0 1       PHQ-9 SCORE 11/20/2018   PHQ-9 Total Score 4       SAURAV-7 SCORE 11/20/2018   Total  Score 6       No flowsheet data found.    Vannesa Nolen CMA  March 12, 2019 11:15 AM

## 2019-03-12 NOTE — LETTER
March 14, 2019       TO: Phani Jeronimo  [address]   [facility] [address]       DearMr.Jumana Jeronimo,    We are writing to inform you of your test results.    Your test results fall within the expected range(s) or remain unchanged from previous results.  Please continue with current treatment plan.    Resulted Orders   Lipid panel reflex to direct LDL Fasting   Result Value Ref Range    Cholesterol 148 <200 mg/dL    Triglycerides 102 <150 mg/dL    HDL Cholesterol 38 (L) >39 mg/dL    LDL Cholesterol Calculated 90 <100 mg/dL      Comment:      Desirable:       <100 mg/dl    Non HDL Cholesterol 110 <130 mg/dL   Vitamin D Level   Result Value Ref Range    Vitamin D Deficiency screening 35 20 - 75 ug/L      Comment:      Season, race, dietary intake, and treatment affect the concentration of   25-hydroxy-Vitamin D. Values may decrease during winter months and increase   during summer months. Values 20-29 ug/L may indicate Vitamin D insufficiency   and values <20 ug/L may indicate Vitamin D deficiency.  Vitamin D determination is routinely performed by an immunoassay specific for   25 hydroxyvitamin D3.  If an individual is on vitamin D2 (ergocalciferol)   supplementation, please specify 25 OH vitamin D2 and D3 level determination by   LCMSMS test [id number].     Albumin Random Urine Quantitative with Creat Ratio   Result Value Ref Range    Creatinine Urine 325 mg/dL    Albumin Urine mg/L 61 mg/L    Albumin Urine mg/g Cr 18.86 (H) 0 - 17 mg/g Cr   Hemoglobin A1c ([facility])   Result Value Ref Range    Hemoglobin A1C 7.7 (H) 4.1 - 5.7 %       Virgil Jeronimo, your vitamin D level is now in the normal range, so that is good. You should continue your daily supplement. Thank you.     Leah SILVA CNP

## 2019-03-12 NOTE — LETTER
March 13, 2019       TO: Phani Jeronimo  [address]   [place] [address]       DearMr.Jumana Jeronimo,    We are writing to inform you of your test results.    Test results indicate you may require additional follow up, see comment below.    Resulted Orders   Lipid panel reflex to direct LDL Fasting   Result Value Ref Range    Cholesterol 148 <200 mg/dL    Triglycerides 102 <150 mg/dL    HDL Cholesterol 38 (L) >39 mg/dL    LDL Cholesterol Calculated 90 <100 mg/dL      Comment:      Desirable:       <100 mg/dl    Non HDL Cholesterol 110 <130 mg/dL   Albumin Random Urine Quantitative with Creat Ratio   Result Value Ref Range    Creatinine Urine 325 mg/dL    Albumin Urine mg/L 61 mg/L    Albumin Urine mg/g Cr 18.86 (H) 0 - 17 mg/g Cr   Hemoglobin A1c ([facility])   Result Value Ref Range    Hemoglobin A1C 7.7 (H) 4.1 - 5.7 %       Phani, your lab work is stable.  Your A1c is slightly increased from previous as we discussed at your visit; The plan is to continue current med regimen and increase exercise.  Renal function is improved from previous. Lipids are stable, recommend increased exercise and continued attention to diet. Your HDL (good cholesterol) is a little low. The statin drug will help raise this as will continued exercise once the weather improves. Thank you, Leah SILVA CNP

## 2019-03-13 DIAGNOSIS — I10 BENIGN ESSENTIAL HYPERTENSION: ICD-10-CM

## 2019-03-13 LAB — DEPRECATED CALCIDIOL+CALCIFEROL SERPL-MC: 35 UG/L (ref 20–75)

## 2019-03-13 RX ORDER — AMLODIPINE BESYLATE 10 MG/1
10 TABLET ORAL DAILY
Qty: 90 TABLET | Refills: 1 | Status: SHIPPED | OUTPATIENT
Start: 2019-03-13 | End: 2019-07-12

## 2019-03-13 RX ORDER — METOPROLOL TARTRATE 100 MG
100 TABLET ORAL 2 TIMES DAILY
Qty: 60 TABLET | Refills: 2 | Status: SHIPPED | OUTPATIENT
Start: 2019-03-13 | End: 2019-08-06

## 2019-03-15 NOTE — PROGRESS NOTES
"SUBJECTIVE: Phani is a 59 year old male who presents today for follow up.    Diabetes:   Taking metformin twice daily and Victoza once daily and insulin glargine 22 units BID. Checks blood glucose in the afternoon and also morning     Fasting: lowest is 79. Average is 90     When he had a blood sugar of 79, he felt a bit symptomatic- a bit shaky- and then had to eat to feel better. He rarely has blood sugars below 70.     HTN: When he forgets to take his blood pressure medication, he experiences a headache. However, he notes his home BP is around 120/70. He notes that he is able to take his morning dose of BP medications, but is forgetting to take his evening dose of BP medications about twice weekly. Despite this, he would prefer to continue on twice daily metoprolol.      Environmental factors that impact patient :English is a second language, but his English proficiency is strong.    Patient Active Problem List   Diagnosis     Hypercholesteremia     CARDIOVASCULAR SCREENING; LDL GOAL LESS THAN 100     Hypertension goal BP (blood pressure) < 130/80     Type 2 diabetes, HbA1C goal < 8% (H)     Advanced directives, counseling/discussion     Impingement syndrome, shoulder     Pain in joint, shoulder region     Type 2 diabetes mellitus without complication (H)     Vitamin D deficiency        OBJECTIVE:     SAURAV-7 SCORE 11/20/2018   Total Score 6       PHQ-9 SCORE 11/20/2018   PHQ-9 Total Score 4         VITALS:  BP Readings from Last 3 Encounters:   03/12/19 116/72   01/08/19 123/74   12/04/18 149/76           Weight:   Wt Readings from Last 1 Encounters:   03/12/19 175 lb (79.4 kg)       Height:   Ht Readings from Last 1 Encounters:   03/12/19 5' 7.8\" (172.2 cm)       LABORATORY VALUES:   Last A1C:    Lab Results   Component Value Date    A1C 7.7 03/12/2019   .    Last Basic Metabolic Panel:  Lab Results   Component Value Date     11/20/2018      Lab Results   Component Value Date    POTASSIUM 4.5 11/20/2018 "     Lab Results   Component Value Date    CHLORIDE 108 11/20/2018     Lab Results   Component Value Date    LIU 9.3 11/20/2018     Lab Results   Component Value Date    CO2 25 11/20/2018     Lab Results   Component Value Date    BUN 15 11/20/2018     Lab Results   Component Value Date    CR 0.88 11/20/2018     Lab Results   Component Value Date    GLC 70 11/20/2018       Lipid Panel Labs  Lab Results   Component Value Date    CHOL 148 03/12/2019    CHOL 255 06/06/2014     Lab Results   Component Value Date    TRIG 102 03/12/2019    TRIG 247 06/06/2014     Lab Results   Component Value Date    HDL 38 03/12/2019    HDL 31 06/06/2014     Lab Results   Component Value Date    LDL 90 03/12/2019     06/06/2014       Hepatic Panel Labs  Lab Results   Component Value Date    AST 13 11/20/2018     Lab Results   Component Value Date    ALT 24 11/20/2018         SOCIAL AND FAMILY HISTORY  Social History     Tobacco Use     Smoking status: Never Smoker     Smokeless tobacco: Former User     Types: Snuff   Substance Use Topics     Alcohol use: No    .  Most Recent Immunizations   Administered Date(s) Administered     DTAP (<7y) 05/13/2003     DTaP, Unspecified 12/23/2011     FLU 6-35 months 12/09/2004     Flu, Unspecified 12/09/2004     Influenza (IIV3) PF 12/23/2011     Influenza Vaccine IM 3yrs+ 4 Valent IIV4 12/04/2018     MMR 05/13/2003     Pneumococcal 23 valent 12/23/2011     TDAP Vaccine (Adacel) 12/23/2011     Td (Adult), Adsorbed 05/13/2003     Tdap (Adult) Unspecified Formulation 12/23/2011       CURRENT MEDICATIONS:   Current Outpatient Medications   Medication Sig Dispense Refill     amLODIPine (NORVASC) 10 MG tablet Take 1 tablet (10 mg) by mouth daily 90 tablet 1     aspirin 81 MG chewable tablet Take 1 tablet (81 mg) by mouth daily 100 tablet 1     atorvastatin (LIPITOR) 40 MG tablet Take 1 tablet (40 mg) by mouth daily 90 tablet 1     blood glucose monitoring (ACCU-CHEK MULTICLIX) lancets Use to test blood  sugar 2 x daily or as directed. 100 each 1     blood glucose monitoring (NO BRAND SPECIFIED) test strip Test twice daily. 2 Box 2     Blood Pressure Monitoring (PREMIUM AUTOMATIC BP MONITOR) CHIARA 1 Device daily 1 each 0     glucose (BD GLUCOSE) 5 g chewable tablet Take 2 tablets (10 g) by mouth as needed (prn blood sugar < 70) 15 tablet 0     insulin glargine (BASAGLAR KWIKPEN) 100 UNIT/ML pen Inject 23 Units Subcutaneous 2 times daily 15 mL 2     insulin pen needle (31G X 8 MM) 31G X 8 MM miscellaneous Use to inject insulin twice daily 100 each 0     liraglutide (VICTOZA) 18 MG/3ML solution Inject 1.2 mg Subcutaneous daily 6 mL 2     lisinopril (PRINIVIL/ZESTRIL) 40 MG tablet Take 1 tablet (40 mg) by mouth daily 90 tablet 1     metFORMIN (GLUCOPHAGE) 500 MG tablet Take 2 tablets (1,000 mg) by mouth 2 times daily (with meals) 120 tablet 2     metoprolol tartrate (LOPRESSOR) 100 MG tablet Take 1 tablet (100 mg) by mouth 2 times daily 60 tablet 2     ranitidine (ZANTAC) 150 MG tablet Take 1 tablet (150 mg) by mouth 2 times daily 60 tablet 2     vitamin D3 (CHOLECALCIFEROL) 1000 units (25 mcg) tablet Take 1 tablet (1,000 Units) by mouth daily 100 tablet 3       ALLERGIES:     Allergies   Allergen Reactions     Latex           ASSESSMENT:    Diabetes: At goal based on self-reported blood glucose.  Borderline goal based on A1c drawn in clinic today.  Phani would benefit from increasing his activity level to where it was before winter.  He is planning on being active now that the snow and ice is melting.     HTN: At goal based on home BP readings.      All medications were reviewed and found to be indicated, effective, safe and convenient unless drug therapy problem identified as described above.    PLAN:     - Phani encouraged in his goals to resume exercise routines.  - Insulin glargine dose changed in chart from 23 units BID to 22 units BID to reflect what Phani is taking  - Follow up in 3 months    Options for treatment  and/or follow-up care were reviewed with the patient. Phani Jeronimo was engaged and actively involved in the decision making process. He/She verbalized understanding of the options discussed and was satisfied with the final plan.    Patient was provided with written instructions/medication list via AVS.       Medical conditions reviewed: 2    Medications reviewed: 11    DTP identified: 0    Time spent: 15 minutes    Level of service:1

## 2019-06-11 ENCOUNTER — OFFICE VISIT (OUTPATIENT)
Dept: PHARMACY | Facility: CLINIC | Age: 59
End: 2019-06-11
Payer: COMMERCIAL

## 2019-06-11 ENCOUNTER — OFFICE VISIT (OUTPATIENT)
Dept: FAMILY MEDICINE | Facility: CLINIC | Age: 59
End: 2019-06-11
Payer: COMMERCIAL

## 2019-06-11 VITALS
TEMPERATURE: 98.5 F | BODY MASS INDEX: 26.37 KG/M2 | OXYGEN SATURATION: 97 % | WEIGHT: 174 LBS | HEART RATE: 83 BPM | HEIGHT: 68 IN | RESPIRATION RATE: 16 BRPM | DIASTOLIC BLOOD PRESSURE: 75 MMHG | SYSTOLIC BLOOD PRESSURE: 134 MMHG

## 2019-06-11 DIAGNOSIS — Z86.2 HISTORY OF ANEMIA: ICD-10-CM

## 2019-06-11 DIAGNOSIS — E11.9 TYPE 2 DIABETES MELLITUS WITHOUT COMPLICATION, WITH LONG-TERM CURRENT USE OF INSULIN (H): Primary | ICD-10-CM

## 2019-06-11 DIAGNOSIS — R53.83 FATIGUE, UNSPECIFIED TYPE: ICD-10-CM

## 2019-06-11 DIAGNOSIS — Z79.4 TYPE 2 DIABETES MELLITUS WITHOUT COMPLICATION, WITH LONG-TERM CURRENT USE OF INSULIN (H): ICD-10-CM

## 2019-06-11 DIAGNOSIS — Z79.4 TYPE 2 DIABETES MELLITUS WITHOUT COMPLICATION, WITH LONG-TERM CURRENT USE OF INSULIN (H): Primary | ICD-10-CM

## 2019-06-11 DIAGNOSIS — E11.9 TYPE 2 DIABETES MELLITUS WITHOUT COMPLICATION, WITH LONG-TERM CURRENT USE OF INSULIN (H): ICD-10-CM

## 2019-06-11 LAB
ERYTHROCYTE [DISTWIDTH] IN BLOOD BY AUTOMATED COUNT: 14.3 % (ref 10–15)
HBA1C MFR BLD: 8.5 % (ref 4.1–5.7)
HCT VFR BLD AUTO: 44.2 % (ref 40–53)
HGB BLD-MCNC: 13.8 G/DL (ref 13.3–17.7)
MCH RBC QN AUTO: 27.9 PG (ref 26.5–33)
MCHC RBC AUTO-ENTMCNC: 31.2 G/DL (ref 31.5–36.5)
MCV RBC AUTO: 90 FL (ref 78–100)
PLATELET # BLD AUTO: 278 10E9/L (ref 150–450)
RBC # BLD AUTO: 4.94 10E12/L (ref 4.4–5.9)
WBC # BLD AUTO: 11.4 10E9/L (ref 4–11)

## 2019-06-11 RX ORDER — LIRAGLUTIDE 6 MG/ML
1.2 INJECTION SUBCUTANEOUS DAILY
Qty: 6 ML | Refills: 2 | Status: SHIPPED | OUTPATIENT
Start: 2019-06-11 | End: 2019-09-10

## 2019-06-11 RX ORDER — INSULIN GLARGINE 100 [IU]/ML
22 INJECTION, SOLUTION SUBCUTANEOUS 2 TIMES DAILY
Qty: 9 ML | Refills: 2 | Status: SHIPPED | OUTPATIENT
Start: 2019-06-11 | End: 2019-06-18

## 2019-06-11 ASSESSMENT — ANXIETY QUESTIONNAIRES
GAD7 TOTAL SCORE: 5
2. NOT BEING ABLE TO STOP OR CONTROL WORRYING: SEVERAL DAYS
7. FEELING AFRAID AS IF SOMETHING AWFUL MIGHT HAPPEN: NOT AT ALL
5. BEING SO RESTLESS THAT IT IS HARD TO SIT STILL: NOT AT ALL
IF YOU CHECKED OFF ANY PROBLEMS ON THIS QUESTIONNAIRE, HOW DIFFICULT HAVE THESE PROBLEMS MADE IT FOR YOU TO DO YOUR WORK, TAKE CARE OF THINGS AT HOME, OR GET ALONG WITH OTHER PEOPLE: SOMEWHAT DIFFICULT
1. FEELING NERVOUS, ANXIOUS, OR ON EDGE: SEVERAL DAYS
6. BECOMING EASILY ANNOYED OR IRRITABLE: SEVERAL DAYS
3. WORRYING TOO MUCH ABOUT DIFFERENT THINGS: SEVERAL DAYS

## 2019-06-11 ASSESSMENT — PATIENT HEALTH QUESTIONNAIRE - PHQ9
SUM OF ALL RESPONSES TO PHQ QUESTIONS 1-9: 8
5. POOR APPETITE OR OVEREATING: SEVERAL DAYS

## 2019-06-11 ASSESSMENT — MIFFLIN-ST. JEOR: SCORE: 1564

## 2019-06-11 NOTE — PROGRESS NOTES
HPI       Phani GARCIA Jumana Jeronimo is a 59 year old  who presents for   Chief Complaint   Patient presents with     RECHECK     Pt. presents to the clinic today for a Diabetes follow up.      Diabetes     59 year-old male presents to clinic for DM follow up. Just finished Ramadan. States has been eating a lot more dried fruits during and after Ramadan. Reports blood sugar has been running higher overall.  During Ramadan, found he was awakening at night feeling hungry and BS was ,would eat and BS later was high. Realized he was forgetting to take night time medications during Ramadan. BS was low at other times during Ramadan also --down to 70 and then felt shaky. He would eat and then would feel better. High BS up to 200s at times. Usual is between 80 and 140.   Does not have BS meter with him.   Last A1c was 7.7.  He speaks English, but sometimes difficult to understand.   Problem, Medication and Allergy Lists were   reviewed and updated if needed.     Patient Active Problem List    Diagnosis Date Noted     Vitamin D deficiency 01/08/2019     Priority: Medium     Type 2 diabetes mellitus without complication (H) 10/15/2015     Priority: Medium     Pain in joint, shoulder region 03/11/2014     Priority: Medium     Impingement syndrome, shoulder 10/28/2013     Priority: Medium     Advanced directives, counseling/discussion 03/12/2013     Priority: Medium     Advance Care Planning:   ACP Review and Resources Provided:  Reviewed chart for advance care plan.  Chapo Cowan has no plan or code status on file. Discussed available resources and provided with information. Confirmed code status reflects current choices pending further ACP discussions.  Confirmed/documented designated decision maker(s). See permanent comments section of demographics in clinical tab.   Added by Eduar Shaw on 3/12/2013           Type 2 diabetes, HbA1C goal < 8% (H) 04/26/2012     Priority: Medium     CARDIOVASCULAR SCREENING;  LDL GOAL LESS THAN 100 12/23/2011     Priority: Medium     Hypertension goal BP (blood pressure) < 130/80 12/23/2011     Priority: Medium     Hypercholesteremia 12/10/2010     Priority: Medium         Current Outpatient Medications   Medication Sig Dispense Refill     amLODIPine (NORVASC) 10 MG tablet Take 1 tablet (10 mg) by mouth daily 90 tablet 1     aspirin 81 MG chewable tablet Take 1 tablet (81 mg) by mouth daily 100 tablet 1     atorvastatin (LIPITOR) 40 MG tablet Take 1 tablet (40 mg) by mouth daily 90 tablet 1     Blood Pressure Monitoring (PREMIUM AUTOMATIC BP MONITOR) CHIARA 1 Device daily 1 each 0     glucose (BD GLUCOSE) 5 g chewable tablet Take 2 tablets (10 g) by mouth as needed (prn blood sugar < 70) 15 tablet 0     insulin glargine (BASAGLAR KWIKPEN) 100 UNIT/ML pen Inject 22 Units Subcutaneous 2 times daily 9 mL 2     insulin pen needle (31G X 8 MM) 31G X 8 MM miscellaneous Use to inject insulin twice daily 100 each 0     lisinopril (PRINIVIL/ZESTRIL) 40 MG tablet Take 1 tablet (40 mg) by mouth daily 90 tablet 1     metFORMIN (GLUCOPHAGE) 500 MG tablet Take 2 tablets (1,000 mg) by mouth 2 times daily (with meals) 120 tablet 2     metoprolol tartrate (LOPRESSOR) 100 MG tablet Take 1 tablet (100 mg) by mouth 2 times daily 60 tablet 2     ranitidine (ZANTAC) 150 MG tablet Take 1 tablet (150 mg) by mouth 2 times daily 60 tablet 2     vitamin D3 (CHOLECALCIFEROL) 1000 units (25 mcg) tablet Take 1 tablet (1,000 Units) by mouth daily 100 tablet 3     blood glucose (NO BRAND SPECIFIED) test strip Test twice daily. 2 Box 2     blood glucose monitoring (ACCU-CHEK COMPACT CARE KIT) meter device kit Use to test blood sugars 1-2 times daily or as directed. 1 kit 0     liraglutide (VICTOZA) 18 MG/3ML solution Inject 1.2 mg Subcutaneous daily 6 mL 2         Allergies   Allergen Reactions     Latex Rash     Latex    .    Patient is   an established patient of this clinic.  Past Medical History:   Diagnosis Date      Diabetes mellitus (H)      GERD (gastroesophageal reflux disease)      Hypercholesteremia      Hypertension      Family History   Problem Relation Age of Onset     Diabetes Mother          late 50's     Diabetes Father          54, Heart attack     Eye Disorder Father      Heart Disease Father      Heart Disease Paternal Uncle      Social History     Socioeconomic History     Marital status: Single     Spouse name: None     Number of children: None     Years of education: None     Highest education level: None   Occupational History     Occupation: unemployed   Social Needs     Financial resource strain: None     Food insecurity:     Worry: None     Inability: None     Transportation needs:     Medical: None     Non-medical: None   Tobacco Use     Smoking status: Never Smoker     Smokeless tobacco: Former User     Types: Snuff   Substance and Sexual Activity     Alcohol use: No     Drug use: No     Sexual activity: Not Currently   Lifestyle     Physical activity:     Days per week: 5 days     Minutes per session: None     Stress: None   Relationships     Social connections:     Talks on phone: None     Gets together: None     Attends Tenri[employer] service: None     Active member of club or organization: None     Attends meetings of clubs or organizations: None     Relationship status: None     Intimate partner violence:     Fear of current or ex partner: None     Emotionally abused: None     Physically abused: None     Forced sexual activity: None   Other Topics Concern     Parent/sibling w/ CABG, MI or angioplasty before 65F 55M? No   Social History Narrative    Currently unemployed. Lives in neighborhood        Walks most days.    Cooks own meals.    .         Review of Systems:   Review of Systems  GEN: lost a little weight during Ramadan ( 1 pound). Feels some fatigue and is frequently hungry.  HEENT: no vision issues. Reports occasionally feels like he cannot swallow well, chokes a little on  "his phlegm.  RESP: negative for cough SOB  CV: Denies irregular heart beat, chest pain. Tolerating activity well.  GI: occasional abdominal discomfort usually with acidic or oily foods. Takes ranitidine for relief at these times. Infrequent.   ENDO: denies change in temperature tolerance. Denies increased thirst, urination.   NEURO: denies numbness or tingling in extremities         Physical Exam:     Vitals:    06/11/19 1314   BP: 134/75   BP Location: Left arm   Patient Position: Chair   Cuff Size: Adult Regular   Pulse: 83   Resp: 16   Temp: 98.5  F (36.9  C)   TempSrc: Oral   SpO2: 97%   Weight: 78.9 kg (174 lb)   Height: 1.72 m (5' 7.7\")     Body mass index is 26.69 kg/m .  Vitals were reviewed and were normal     Physical Exam  GEN: alert talkative male in no acute distress  HEENT: STACIE, EOM intact  NECK: thyroid smooth, not enlarged.   CV: S1 S2 HRRR, negative peripheral edema.  Pedal pulses +2 bilaterally.   RESP: lungs clear to auscultation with air entry throughout.   NEURO: CMS feet intact to monofilament except for left heel.     Results:      Results from this visit  Results for orders placed or performed in visit on 06/11/19   Hemoglobin A1c ([facility])   Result Value Ref Range    Hemoglobin A1C 8.5 (H) 4.1 - 5.7 %       Assessment and Plan        1. Type 2 diabetes mellitus without complication, with long-term current use of insulin (H)  A1c 8.5 today, higher than usual  Plan: check blood sugars twice a day: in the morning before eating and at night before dinner  Bring the new glucose meter in in 2 weeks to meet with Lenora Mo, the pharmacist.   Schedule that appointment when you leave.   Goal: 7.0-7.5 A1c  BS range   Use the medi minders to put your morning (red one) and evening (green one) medications in them to help you remember to take the medications.   - Hemoglobin A1c ([facility])  - insulin glargine (BASAGLAR KWIKPEN) 100 UNIT/ML pen; Inject 22 Units Subcutaneous 2 times " daily  Dispense: 9 mL; Refill: 2  - insulin pen needle (31G X 8 MM) 31G X 8 MM miscellaneous; Use to inject insulin twice daily  Dispense: 100 each; Refill: 0    2. History of anemia  Reviewed healthy diet  - CBC with platelets    3. Fatigue, unspecified type  CBC  - TSH with free T4 reflex       There are no discontinued medications.  Follow up 2 weeks with Pharm D  Follow up 3 months for DM follow up. Request he schedule PE. Discussed need for colonoscopy.     If abdominal pain worsens or persists, return to clinic.   Monitor weight.   Next year: give handout on caring for DM during Ramadan.   Consider  to make communication easier.     Patient seen with PharmDLenora.    Options for treatment and follow-up care were reviewed with the patient. Phani Jeronimo  engaged in the decision making process and verbalized understanding of the options discussed and agreed with the final plan.    Leah Ford, RICARDO CNP

## 2019-06-11 NOTE — PATIENT INSTRUCTIONS
Diabetes Mellitus Type 2  A1c 8.5 today, higher than usual  Plan: check blood sugars twice a day: in the morning before eating and at night before dinner  Bring the new glucose meter in in 2 weeks to meet with Lenora Mo, the pharmacist.   Schedule that appointment when you leave.   Goal: 7.0-7.5 A1c  BS range   Use the medi minders to put your morning (red one) and evening (green one) medications in them to help you remember to take the medications.     Diet recommendations  Decrease quick sugars: fast foods, rice, bread, pasta, fruits  Increase vegetables; eat lean meats, eggs  Avoid citrus or oily foods that irritate stomach. Use ranitidine if those symptoms occur.     Consider having a colonoscopy. Have had stool tested for blood but no record of colonoscopy. This is a test to screen for colon cancer.

## 2019-06-11 NOTE — PROGRESS NOTES
SUBJECTIVE: Phani is a 59 year old male who presents today for follow up. Phani has recently observed Ramadan and this caused changes in his diet.    Diabetes: Phani is taking insulin glargine 22 units BID, victoza, and metformin 1000 mg BID.  Phani notes that he continues to forget his second dose of metformin at nights several nights a week (it sounds like this was happening more often during Ramadan).    Phani feels that his diet has been poor lately.  He is eating more sugary foods such as dates, other dried fruits, and watermelon. He has been waking up in the middle of the night during ramadan (at 1am or 2am) and was feeling hungry. It is unclear if he takes his blood sugar during these times - when asked once it sounded like he did not take his blood sugar, but when asked again, it sounds like his blood sugar at these times is .     When asked about what his blood sugar readings have been, his answer was unclear.  He was able to say that this morning, he had a fasting reading of 130.  He does not have his glucometer today.         Hypertension: Phani notes that he is still taking lisinopril, amlodipine, and metoprolol.      Environmental factors that impact patient: Ramadan changed Phani's eating and medication schedule.    Patient Active Problem List   Diagnosis     Hypercholesteremia     CARDIOVASCULAR SCREENING; LDL GOAL LESS THAN 100     Hypertension goal BP (blood pressure) < 130/80     Type 2 diabetes, HbA1C goal < 8% (H)     Advanced directives, counseling/discussion     Impingement syndrome, shoulder     Pain in joint, shoulder region     Type 2 diabetes mellitus without complication (H)     Vitamin D deficiency        OBJECTIVE:     SAURAV-7 SCORE 11/20/2018 6/11/2019   Total Score 6 5       PHQ-9 SCORE 11/20/2018 6/11/2019   PHQ-9 Total Score 4 8         VITALS:  BP Readings from Last 3 Encounters:   06/11/19 134/75   03/12/19 116/72   01/08/19 123/74           Weight:   Wt Readings from Last 1  "Encounters:   06/11/19 78.9 kg (174 lb)       Height:   Ht Readings from Last 1 Encounters:   06/11/19 1.72 m (5' 7.7\")       LABORATORY VALUES:   Last A1C:    Lab Results   Component Value Date    A1C 8.5 06/11/2019   .    Last Basic Metabolic Panel:  Lab Results   Component Value Date     11/20/2018      Lab Results   Component Value Date    POTASSIUM 4.5 11/20/2018     Lab Results   Component Value Date    CHLORIDE 108 11/20/2018     Lab Results   Component Value Date    LIU 9.3 11/20/2018     Lab Results   Component Value Date    CO2 25 11/20/2018     Lab Results   Component Value Date    BUN 15 11/20/2018     Lab Results   Component Value Date    CR 0.88 11/20/2018     Lab Results   Component Value Date    GLC 70 11/20/2018       Lipid Panel Labs  Lab Results   Component Value Date    CHOL 148 03/12/2019    CHOL 255 06/06/2014     Lab Results   Component Value Date    TRIG 102 03/12/2019    TRIG 247 06/06/2014     Lab Results   Component Value Date    HDL 38 03/12/2019    HDL 31 06/06/2014     Lab Results   Component Value Date    LDL 90 03/12/2019     06/06/2014       Hepatic Panel Labs  Lab Results   Component Value Date    AST 13 11/20/2018     Lab Results   Component Value Date    ALT 24 11/20/2018         SOCIAL AND FAMILY HISTORY  Social History     Tobacco Use     Smoking status: Never Smoker     Smokeless tobacco: Former User     Types: Snuff   Substance Use Topics     Alcohol use: No    .  Most Recent Immunizations   Administered Date(s) Administered     DTAP (<7y) 05/13/2003     DTaP, Unspecified 12/23/2011     FLU 6-35 months 12/09/2004     Flu, Unspecified 12/09/2004     Influenza (IIV3) PF 12/23/2011     Influenza Vaccine IM 3yrs+ 4 Valent IIV4 12/04/2018     MMR 05/13/2003     Pneumococcal 23 valent 12/23/2011     TDAP Vaccine (Adacel) 12/23/2011     Td (Adult), Adsorbed 05/13/2003     Tdap (Adult) Unspecified Formulation 12/23/2011       CURRENT MEDICATIONS:   Current Outpatient " Medications   Medication Sig Dispense Refill     amLODIPine (NORVASC) 10 MG tablet Take 1 tablet (10 mg) by mouth daily 90 tablet 1     aspirin 81 MG chewable tablet Take 1 tablet (81 mg) by mouth daily 100 tablet 1     atorvastatin (LIPITOR) 40 MG tablet Take 1 tablet (40 mg) by mouth daily 90 tablet 1     blood glucose (NO BRAND SPECIFIED) test strip Test twice daily. 2 Box 2     blood glucose monitoring (ACCU-CHEK COMPACT CARE KIT) meter device kit Use to test blood sugars 1-2 times daily or as directed. 1 kit 0     Blood Pressure Monitoring (PREMIUM AUTOMATIC BP MONITOR) CHIARA 1 Device daily 1 each 0     glucose (BD GLUCOSE) 5 g chewable tablet Take 2 tablets (10 g) by mouth as needed (prn blood sugar < 70) 15 tablet 0     insulin glargine (BASAGLAR KWIKPEN) 100 UNIT/ML pen Inject 22 Units Subcutaneous 2 times daily 9 mL 2     insulin pen needle (31G X 8 MM) 31G X 8 MM miscellaneous Use to inject insulin twice daily 100 each 0     liraglutide (VICTOZA) 18 MG/3ML solution Inject 1.2 mg Subcutaneous daily 6 mL 2     lisinopril (PRINIVIL/ZESTRIL) 40 MG tablet Take 1 tablet (40 mg) by mouth daily 90 tablet 1     metFORMIN (GLUCOPHAGE) 500 MG tablet Take 2 tablets (1,000 mg) by mouth 2 times daily (with meals) 120 tablet 2     metoprolol tartrate (LOPRESSOR) 100 MG tablet Take 1 tablet (100 mg) by mouth 2 times daily 60 tablet 2     ranitidine (ZANTAC) 150 MG tablet Take 1 tablet (150 mg) by mouth 2 times daily 60 tablet 2     vitamin D3 (CHOLECALCIFEROL) 1000 units (25 mcg) tablet Take 1 tablet (1,000 Units) by mouth daily 100 tablet 3       ALLERGIES:     Allergies   Allergen Reactions     Latex Rash     Latex           ASSESSMENT:    Diabetes: Not at goal based on A1c drawn in clinic today. However, it is unclear what his blood sugar has been at home and how much his blood sugar might change now that Ramadan has passed.  It will be best to assess a few weeks of blood glucose readings prior to potentially adjusting  his medications.  Additionally, it might be best for Phani to switch to a long-acting version of metformin that he only needs to take in the morning.  He has been resistant to this in the past, but it is unclear why and this should be explored more.  MTP: Needs additional monitoring  MTP: Noncompliance    HTN: Slightly elevated based on BP from clinic today.  Goal <130/80.  However, BP readings have been at goal for two visits prior to today's visit.  Will be best to follow closely and it's possible BP will come down with improvement in diet.    All medications were reviewed and found to be indicated, effective, safe and convenient unless drug therapy problem identified as described above.    PLAN:   - Refills of medications were sent to pharmacy  - Advised patient to test BG two times daily and bring meter to appointment in two weeks  - Advised patient to bring all pill bottles to appointment in two weeks  - No changes in medications made today.  - F/U in 2 weeks with this writer.    Options for treatment and/or follow-up care were reviewed with the patient. Phani Jeronimo was engaged and actively involved in the decision making process. He/She verbalized understanding of the options discussed and was satisfied with the final plan.    Patient was provided with written instructions/medication list via AVS.       Medical conditions reviewed: 2    Medications reviewed: 12    DTP identified: 2    Time spent:  30 minutes    Level of service:3

## 2019-06-11 NOTE — LETTER
June 13, 2019       TO: Phani Jeronimo  [address]   [facility] [address]       DearMr.Jumana Jeronimo,    We are writing to inform you of your test results.    Test results indicate you may require additional follow up, see comment below.    Resulted Orders   Hemoglobin A1c ([facility])   Result Value Ref Range    Hemoglobin A1C 8.5 (H) 4.1 - 5.7 %   CBC with platelets   Result Value Ref Range    WBC 11.4 (H) 4.0 - 11.0 10e9/L    RBC Count 4.94 4.4 - 5.9 10e12/L    Hemoglobin 13.8 13.3 - 17.7 g/dL    Hematocrit 44.2 40.0 - 53.0 %    MCV 90 78 - 100 fl    MCH 27.9 26.5 - 33.0 pg    MCHC 31.2 (L) 31.5 - 36.5 g/dL    RDW 14.3 10.0 - 15.0 %    Platelet Count 278 150 - 450 10e9/L   TSH with free T4 reflex   Result Value Ref Range    TSH 1.35 0.40 - 4.00 mU/L   Basic metabolic panel   Result Value Ref Range    Sodium 140 133 - 144 mmol/L    Potassium 4.3 3.4 - 5.3 mmol/L    Chloride 109 94 - 109 mmol/L    Carbon Dioxide 22 20 - 32 mmol/L    Anion Gap 9 3 - 14 mmol/L    Glucose 156 (H) 70 - 99 mg/dL    Urea Nitrogen 20 7 - 30 mg/dL    Creatinine 0.99 0.66 - 1.25 mg/dL    GFR Estimate 81 >60 mL/min/[1.73_m2]      Comment:      Non  GFR Calc  Starting 12/18/2018, serum creatinine based estimated GFR (eGFR) will be   calculated using the Chronic Kidney Disease Epidemiology Collaboration   (CKD-EPI) equation.      GFR Estimate If Black >90 >60 mL/min/[1.73_m2]      Comment:       GFR Calc  Starting 12/18/2018, serum creatinine based estimated GFR (eGFR) will be   calculated using the Chronic Kidney Disease Epidemiology Collaboration   (CKD-EPI) equation.      Calcium 9.6 8.5 - 10.1 mg/dL       Phani, your lab results show blood sugar of 156 which is above your goal range as we discussed at your visit. Your 3 month blood sugar average (A1c) was also higher at 8.5 as we discussed at your visit. Your goal will be to get the A1c down to around 7.5 and your blood sugar  between 90 and 140.  We discussed attention to low carbohydrate diet, reducing some of the dried fruits you have been eating and increasing exercise.  Your thyroid level, electrolytes are normal. There is a slight decline in kidney function, but still within normal limits. Your hemoglobin is within normal limits. Your white blood cell count is just above normal but down from previous value.  We will monitor that.  Please follow up with Lenora oM, the pharmacist as we discussed at your visit. I will see you again in 3 months. Please let me know if you have any questions.     Thank you,    Leah SILVA CNP

## 2019-06-11 NOTE — LETTER
June 14, 2019       TO: Phani Jeronimo  [address]   [facility] [address]       DearMr.Jumana Jeronimo,    We are writing to inform you of your test results.    Test results indicate you may require additional follow up, see comment below.    Resulted Orders   Hemoglobin A1c ([facility])   Result Value Ref Range    Hemoglobin A1C 8.5 (H) 4.1 - 5.7 %   CBC with platelets   Result Value Ref Range    WBC 11.4 (H) 4.0 - 11.0 10e9/L    RBC Count 4.94 4.4 - 5.9 10e12/L    Hemoglobin 13.8 13.3 - 17.7 g/dL    Hematocrit 44.2 40.0 - 53.0 %    MCV 90 78 - 100 fl    MCH 27.9 26.5 - 33.0 pg    MCHC 31.2 (L) 31.5 - 36.5 g/dL    RDW 14.3 10.0 - 15.0 %    Platelet Count 278 150 - 450 10e9/L   TSH with free T4 reflex   Result Value Ref Range    TSH 1.35 0.40 - 4.00 mU/L   Basic metabolic panel   Result Value Ref Range    Sodium 140 133 - 144 mmol/L    Potassium 4.3 3.4 - 5.3 mmol/L    Chloride 109 94 - 109 mmol/L    Carbon Dioxide 22 20 - 32 mmol/L    Anion Gap 9 3 - 14 mmol/L    Glucose 156 (H) 70 - 99 mg/dL    Urea Nitrogen 20 7 - 30 mg/dL    Creatinine 0.99 0.66 - 1.25 mg/dL    GFR Estimate 81 >60 mL/min/[1.73_m2]      Comment:      Non  GFR Calc  Starting 12/18/2018, serum creatinine based estimated GFR (eGFR) will be   calculated using the Chronic Kidney Disease Epidemiology Collaboration   (CKD-EPI) equation.      GFR Estimate If Black >90 >60 mL/min/[1.73_m2]      Comment:       GFR Calc  Starting 12/18/2018, serum creatinine based estimated GFR (eGFR) will be   calculated using the Chronic Kidney Disease Epidemiology Collaboration   (CKD-EPI) equation.      Calcium 9.6 8.5 - 10.1 mg/dL       Here are the final results of all your lab tests.  Your blood sugar and 3 month blood sugar average are elevated. You will want to work on diet and exercise as we discussed at your visit. Your goal blood sugar range I .  Your 3 month blood sugar average goal is 7.5 (you  are currently at 8.5). Your renal status shows slight decline and we will monitor. Your white blood cell count is just above normal but lower than previous. We will monitor. Your thyroid level is normal. Rest of labs are within normal limits. Please follow up with the pharmacist as we discussed at your visit. You should see me again in 3 months. Thank you,    Leah SILVA CNP

## 2019-06-11 NOTE — NURSING NOTE
"59 year old  Chief Complaint   Patient presents with     RECHECK     Pt. presents to the clinic today for a Diabetes follow up.      Diabetes       Blood pressure 134/75, pulse 83, temperature 98.5  F (36.9  C), temperature source Oral, resp. rate 16, height 1.72 m (5' 7.7\"), weight 78.9 kg (174 lb), SpO2 97 %. Body mass index is 26.69 kg/m .  BP completed using cuff size:    Patient Active Problem List   Diagnosis     Hypercholesteremia     CARDIOVASCULAR SCREENING; LDL GOAL LESS THAN 100     Hypertension goal BP (blood pressure) < 130/80     Type 2 diabetes, HbA1C goal < 8% (H)     Advanced directives, counseling/discussion     Impingement syndrome, shoulder     Pain in joint, shoulder region     Type 2 diabetes mellitus without complication (H)     Vitamin D deficiency       Wt Readings from Last 2 Encounters:   06/11/19 78.9 kg (174 lb)   03/12/19 79.4 kg (175 lb)     BP Readings from Last 3 Encounters:   06/11/19 134/75   03/12/19 116/72   01/08/19 123/74       Allergies   Allergen Reactions     Latex Rash     Latex        Current Outpatient Medications   Medication     amLODIPine (NORVASC) 10 MG tablet     aspirin 81 MG chewable tablet     atorvastatin (LIPITOR) 40 MG tablet     blood glucose monitoring (ACCU-CHEK MULTICLIX) lancets     blood glucose monitoring (NO BRAND SPECIFIED) test strip     Blood Pressure Monitoring (PREMIUM AUTOMATIC BP MONITOR) CHIARA     glucose (BD GLUCOSE) 5 g chewable tablet     insulin glargine (BASAGLAR KWIKPEN) 100 UNIT/ML pen     insulin pen needle (31G X 8 MM) 31G X 8 MM miscellaneous     liraglutide (VICTOZA) 18 MG/3ML solution     lisinopril (PRINIVIL/ZESTRIL) 40 MG tablet     metFORMIN (GLUCOPHAGE) 500 MG tablet     metoprolol tartrate (LOPRESSOR) 100 MG tablet     ranitidine (ZANTAC) 150 MG tablet     vitamin D3 (CHOLECALCIFEROL) 1000 units (25 mcg) tablet     No current facility-administered medications for this visit.        Social History     Tobacco Use     Smoking " status: Never Smoker     Smokeless tobacco: Former User     Types: Snuff   Substance Use Topics     Alcohol use: No     Drug use: No       Honoring Choices - Health Care Directive Guide offered to patient at time of visit.    Health Maintenance Due   Topic Date Due     HEPATITIS C SCREENING  1958     COLONOSCOPY  01/01/1968     HIV SCREENING  01/01/1973     ZOSTER IMMUNIZATION (1 of 2) 01/01/2008     PREVENTIVE CARE VISIT  03/12/2014     DIABETIC FOOT EXAM  06/12/2015     EYE EXAM  06/20/2015     ADVANCED DIRECTIVE PLANNING  03/12/2018     PHQ-2  01/01/2019       Immunization History   Administered Date(s) Administered     DTAP (<7y) 04/02/2003, 05/13/2003     DTaP, Unspecified 12/23/2011     FLU 6-35 months 12/09/2004     Flu, Unspecified 12/09/2004     Influenza (IIV3) PF 12/23/2011     Influenza Vaccine IM 3yrs+ 4 Valent IIV4 12/04/2018     MMR 04/02/2003, 05/13/2003     Pneumococcal 23 valent 12/23/2011     TDAP Vaccine (Adacel) 12/23/2011     Td (Adult), Adsorbed 04/02/2003, 05/13/2003     Tdap (Adult) Unspecified Formulation 12/23/2011       No results found for: PAP      Recent Labs   Lab Test 03/12/19  1207 03/12/19  1157 11/20/18  1455 11/20/18  1426 06/06/14  0945 02/18/14  0826 01/08/14  1521   A1C 7.7*  --  7.0*  --  8.8* 9.1*  --    LDL  --  90  --   --  174* 110  --    HDL  --  38*  --   --  31* 35*  --    TRIG  --  102  --   --  247* 192*  --    ALT  --   --   --  24 28  --  30   CR  --   --   --  0.88 0.83  --  0.88   GFRESTIMATED  --   --   --  88 >90  --  90   GFRESTBLACK  --   --   --  >90 >90  --  >90   ALBUMIN  --   --   --  4.3 4.1  --  4.1   POTASSIUM  --   --   --  4.5 4.4  --  4.3   TSH  --   --   --  1.83  --   --  1.83       PHQ-2 ( 1999 Pfizer) 6/11/2019 12/4/2018   Q1: Little interest or pleasure in doing things 2 0   Q2: Feeling down, depressed or hopeless 1 0   PHQ-2 Score 3 0       PHQ-9 SCORE 11/20/2018 6/11/2019   PHQ-9 Total Score 4 8       SAURAV-7 SCORE 11/20/2018 6/11/2019    Total Score 6 5       No flowsheet data found.    Vannesa Nolen, TABITHA  June 11, 2019 1:16 PM

## 2019-06-12 LAB
ANION GAP SERPL CALCULATED.3IONS-SCNC: 9 MMOL/L (ref 3–14)
BUN SERPL-MCNC: 20 MG/DL (ref 7–30)
CALCIUM SERPL-MCNC: 9.6 MG/DL (ref 8.5–10.1)
CHLORIDE SERPL-SCNC: 109 MMOL/L (ref 94–109)
CO2 SERPL-SCNC: 22 MMOL/L (ref 20–32)
CREAT SERPL-MCNC: 0.99 MG/DL (ref 0.66–1.25)
GFR SERPL CREATININE-BSD FRML MDRD: 81 ML/MIN/{1.73_M2}
GLUCOSE SERPL-MCNC: 156 MG/DL (ref 70–99)
POTASSIUM SERPL-SCNC: 4.3 MMOL/L (ref 3.4–5.3)
SODIUM SERPL-SCNC: 140 MMOL/L (ref 133–144)
TSH SERPL DL<=0.005 MIU/L-ACNC: 1.35 MU/L (ref 0.4–4)

## 2019-06-12 ASSESSMENT — ANXIETY QUESTIONNAIRES: GAD7 TOTAL SCORE: 5

## 2019-06-18 ENCOUNTER — TELEPHONE (OUTPATIENT)
Dept: PHARMACY | Facility: CLINIC | Age: 59
End: 2019-06-18

## 2019-06-18 DIAGNOSIS — E11.21 TYPE 2 DIABETES MELLITUS WITH DIABETIC NEPHROPATHY, WITH LONG-TERM CURRENT USE OF INSULIN (H): Primary | ICD-10-CM

## 2019-06-18 DIAGNOSIS — Z79.4 TYPE 2 DIABETES MELLITUS WITH DIABETIC NEPHROPATHY, WITH LONG-TERM CURRENT USE OF INSULIN (H): Primary | ICD-10-CM

## 2019-06-18 NOTE — TELEPHONE ENCOUNTER
Received paperwork from patient's insurance company which noted that in July 2019, patient's blood glucose test strips will not be covered (new formulary alternatives) and Basaglar insulin will not be covered (preferred agent is Lantus).    Called pharmacy and they noted that patient recently picked up a new glucometer (that we sent in based on this notice) and patient picked up Basaglar a few days ago.     Called patient and left message.  Noted that he does not need to do anything now and that we can discuss changes at his visit next week.    Lenora Mo, PharmD

## 2019-06-25 ENCOUNTER — OFFICE VISIT (OUTPATIENT)
Dept: PHARMACY | Facility: CLINIC | Age: 59
End: 2019-06-25
Payer: COMMERCIAL

## 2019-06-25 VITALS
SYSTOLIC BLOOD PRESSURE: 123 MMHG | DIASTOLIC BLOOD PRESSURE: 73 MMHG | HEIGHT: 68 IN | RESPIRATION RATE: 16 BRPM | OXYGEN SATURATION: 97 % | WEIGHT: 176 LBS | TEMPERATURE: 97.6 F | BODY MASS INDEX: 26.67 KG/M2 | HEART RATE: 80 BPM

## 2019-06-25 DIAGNOSIS — Z79.4 TYPE 2 DIABETES MELLITUS WITHOUT COMPLICATION, WITH LONG-TERM CURRENT USE OF INSULIN (H): Primary | ICD-10-CM

## 2019-06-25 DIAGNOSIS — E11.9 TYPE 2 DIABETES MELLITUS WITHOUT COMPLICATION, WITH LONG-TERM CURRENT USE OF INSULIN (H): Primary | ICD-10-CM

## 2019-06-25 ASSESSMENT — MIFFLIN-ST. JEOR: SCORE: 1571.48

## 2019-06-25 NOTE — PATIENT INSTRUCTIONS
- Please schedule a follow up visit in one month  - When you return bring your glucometer  - Keep using Basaglar insulin until it runs out and then switch to Lantus insulin  - Please call or let us know if you are having issues with low blood sugars

## 2019-06-25 NOTE — PROGRESS NOTES
"SUBJECTIVE: Phani is a 59 year old male who presents for follow up      Medication adherence:   Phani brings his medications and his pill bottles match our records. He thinks in the past two weeks, he has forgotten his medications about 2-3 times (this is the evening dose only).  He never forgets in the morning. He notes in the past, he has tried to take a different formulation of metformin where he only took it once a day and it \"didn't work for his stomach.\"    Diabetes:   Phani notes that he has been unable to take Victoza for the past 20 days. He feels that his blood sugar is not controlled. He brings his glucometers.He notes taht he has had some hotness in his stomach. He may be having some constipation. He wonders if the constipation is from beans he is eating.  He plans to avoid the beans.    Readings since resuming Victoza:  Fastin, 118, 85, 107  Before bed or before dinner: 181, 78, 106    Readings prior to Victoza:  Fastins-140s  Before bed: low 200s    He notes that his diet has been better in the past two weeks.      Hypoglycemia: He notes that he has had a reading in the 70s (verified by glucometer reading above) and he feels like he needs to correct for those.  He generally uses dates to correct.     When we discussed a change from Basaglar to Lantus, he notes he has a little stockpile of insulin - about 3 months worth.     Hypertension:  Phani continues to take losartan and metoprolol. Based on his pill bottles, his recollection is accurate.    Environmental factors that impact patient:He walks many places. English is not first language.      Patient Active Problem List   Diagnosis     Hypercholesteremia     CARDIOVASCULAR SCREENING; LDL GOAL LESS THAN 100     Hypertension goal BP (blood pressure) < 130/80     Type 2 diabetes, HbA1C goal < 8% (H)     Advanced directives, counseling/discussion     Impingement syndrome, shoulder     Pain in joint, shoulder region     Type 2 diabetes mellitus " "without complication (H)     Vitamin D deficiency        OBJECTIVE:     SAURAV-7 SCORE 11/20/2018 6/11/2019   Total Score 6 5       PHQ-9 SCORE 11/20/2018 6/11/2019   PHQ-9 Total Score 4 8         VITALS:  BP Readings from Last 3 Encounters:   06/25/19 131/76   06/11/19 134/75   03/12/19 116/72           Weight:   Wt Readings from Last 1 Encounters:   06/25/19 176 lb (79.8 kg)       Height:   Ht Readings from Last 1 Encounters:   06/25/19 5' 7.6\" (171.7 cm)       LABORATORY VALUES:   Last A1C:    Lab Results   Component Value Date    A1C 8.5 06/11/2019   .    Last Basic Metabolic Panel:  Lab Results   Component Value Date     06/11/2019      Lab Results   Component Value Date    POTASSIUM 4.3 06/11/2019     Lab Results   Component Value Date    CHLORIDE 109 06/11/2019     Lab Results   Component Value Date    LIU 9.6 06/11/2019     Lab Results   Component Value Date    CO2 22 06/11/2019     Lab Results   Component Value Date    BUN 20 06/11/2019     Lab Results   Component Value Date    CR 0.99 06/11/2019     Lab Results   Component Value Date     06/11/2019       Lipid Panel Labs  Lab Results   Component Value Date    CHOL 148 03/12/2019    CHOL 255 06/06/2014     Lab Results   Component Value Date    TRIG 102 03/12/2019    TRIG 247 06/06/2014     Lab Results   Component Value Date    HDL 38 03/12/2019    HDL 31 06/06/2014     Lab Results   Component Value Date    LDL 90 03/12/2019     06/06/2014       Hepatic Panel Labs  Lab Results   Component Value Date    AST 13 11/20/2018     Lab Results   Component Value Date    ALT 24 11/20/2018         SOCIAL AND FAMILY HISTORY  Social History     Tobacco Use     Smoking status: Never Smoker     Smokeless tobacco: Former User     Types: Snuff   Substance Use Topics     Alcohol use: No    .  Most Recent Immunizations   Administered Date(s) Administered     DTAP (<7y) 05/13/2003     DTaP, Unspecified 12/23/2011     FLU 6-35 months 12/09/2004     Flu, " Unspecified 12/09/2004     Influenza (IIV3) PF 12/23/2011     Influenza Vaccine IM 3yrs+ 4 Valent IIV4 12/04/2018     MMR 05/13/2003     Pneumococcal 23 valent 12/23/2011     TDAP Vaccine (Adacel) 12/23/2011     Td (Adult), Adsorbed 05/13/2003     Tdap (Adult) Unspecified Formulation 12/23/2011       CURRENT MEDICATIONS:   Current Outpatient Medications   Medication Sig Dispense Refill     amLODIPine (NORVASC) 10 MG tablet Take 1 tablet (10 mg) by mouth daily 90 tablet 1     aspirin 81 MG chewable tablet Take 1 tablet (81 mg) by mouth daily 100 tablet 1     atorvastatin (LIPITOR) 40 MG tablet Take 1 tablet (40 mg) by mouth daily 90 tablet 1     blood glucose (NO BRAND SPECIFIED) test strip Test twice daily. 2 Box 2     blood glucose monitoring (ACCU-CHEK COMPACT CARE KIT) meter device kit Use to test blood sugars 1-2 times daily or as directed. 1 kit 0     Blood Pressure Monitoring (PREMIUM AUTOMATIC BP MONITOR) CHIARA 1 Device daily 1 each 0     glucose (BD GLUCOSE) 5 g chewable tablet Take 2 tablets (10 g) by mouth as needed (prn blood sugar < 70) 15 tablet 0     insulin glargine (LANTUS SOLOSTAR PEN) 100 UNIT/ML pen Inject 22 Units Subcutaneous 2 times daily 15 mL 2     insulin pen needle (31G X 8 MM) 31G X 8 MM miscellaneous Use to inject insulin twice daily 100 each 0     liraglutide (VICTOZA) 18 MG/3ML solution Inject 1.2 mg Subcutaneous daily 6 mL 2     lisinopril (PRINIVIL/ZESTRIL) 40 MG tablet Take 1 tablet (40 mg) by mouth daily 90 tablet 1     metFORMIN (GLUCOPHAGE) 500 MG tablet Take 2 tablets (1,000 mg) by mouth 2 times daily (with meals) 120 tablet 2     metoprolol tartrate (LOPRESSOR) 100 MG tablet Take 1 tablet (100 mg) by mouth 2 times daily 60 tablet 2     ranitidine (ZANTAC) 150 MG tablet Take 1 tablet (150 mg) by mouth 2 times daily 60 tablet 2     vitamin D3 (CHOLECALCIFEROL) 1000 units (25 mcg) tablet Take 1 tablet (1,000 Units) by mouth daily 100 tablet 3       ALLERGIES:     Allergies    Allergen Reactions     Latex Rash     Latex           ASSESSMENT:  Medication adherence: No problems noted based on pill bottles that were brought in today.  Phani continues to occassionally miss his evening dose of metformin, but it is not best to switch to an extended release metformin based on what seems like a previous intolerance of taking a high dose once daily.    Diabetes: At goal based on BG readings since Victoza was re-initiated. Will need to continue to track BG since we only have a couple of days of readings with Victoza on board.  MTP: Need additional monitoring.    Hypertension: at goal based on re-check of BP. Goal <130/80.    All medications were reviewed and found to be indicated, effective, safe and convenient unless drug therapy problem identified as described above.    PLAN:     - Please schedule a follow up visit in one month  - When you return bring your glucometer  - Keep using Basaglar insulin until it runs out and then switch to Lantus insulin  - Please call or let us know if you are having issues with low blood sugars    Options for treatment and/or follow-up care were reviewed with the patient. Phani Jeronimo was engaged and actively involved in the decision making process. He/She verbalized understanding of the options discussed and was satisfied with the final plan.    Patient was provided with written instructions/medication list via AVS.       Medical conditions reviewed: 2    Medications reviewed: 10    MTP identified: 1    Time spent: 30 minutes    Level of service:: 2

## 2019-07-12 ENCOUNTER — TELEPHONE (OUTPATIENT)
Dept: FAMILY MEDICINE | Facility: CLINIC | Age: 59
End: 2019-07-12

## 2019-07-12 DIAGNOSIS — E11.9 TYPE 2 DIABETES MELLITUS WITHOUT COMPLICATION, WITH LONG-TERM CURRENT USE OF INSULIN (H): ICD-10-CM

## 2019-07-12 DIAGNOSIS — I10 HYPERTENSION GOAL BP (BLOOD PRESSURE) < 130/80: ICD-10-CM

## 2019-07-12 DIAGNOSIS — Z79.4 TYPE 2 DIABETES MELLITUS WITHOUT COMPLICATION, WITH LONG-TERM CURRENT USE OF INSULIN (H): ICD-10-CM

## 2019-07-12 DIAGNOSIS — I10 BENIGN ESSENTIAL HYPERTENSION: ICD-10-CM

## 2019-07-12 RX ORDER — LISINOPRIL 40 MG/1
40 TABLET ORAL DAILY
Qty: 90 TABLET | Refills: 1 | Status: SHIPPED | OUTPATIENT
Start: 2019-07-12 | End: 2019-12-10

## 2019-07-12 RX ORDER — AMLODIPINE BESYLATE 10 MG/1
10 TABLET ORAL DAILY
Qty: 90 TABLET | Refills: 1 | Status: SHIPPED | OUTPATIENT
Start: 2019-07-12 | End: 2020-09-08

## 2019-07-12 NOTE — TELEPHONE ENCOUNTER
[facility] phone call message- patient requesting a refill:    Full Medication Name: metFORMIN (GLUCOPHAGE) 500 MG tablet, lisinopril (PRINIVIL/ZESTRIL) 40 MG tablet, amLODIPine (NORVASC) 10 MG tablet    Dose: See Chart     Pharmacy confirmed as   [id number] - [place], [place] - [address] [place] [address]  Phone: [phone] Fax: [phone]  : Yes      Additional Comments: No     OK to leave a message on voice mail? Yes    Primary language: English      needed? No    Call taken on July 12, 2019 at 11:54 AM by Jolene Savage

## 2019-07-12 NOTE — TELEPHONE ENCOUNTER
3 requested meds refilled: norvasc, lisinopril and metformin although should have had a remaining refill on Norvasc. Message left on patient phone indicating this was completed. Leah SILVA CNP

## 2019-07-12 NOTE — TELEPHONE ENCOUNTER
LVM letting the patient know that his refills have been sent to the pharmacy.     Jolene Savage, CMA

## 2019-07-25 ENCOUNTER — TELEPHONE (OUTPATIENT)
Dept: FAMILY MEDICINE | Facility: CLINIC | Age: 59
End: 2019-07-25

## 2019-07-25 DIAGNOSIS — Z79.4 TYPE 2 DIABETES MELLITUS WITHOUT COMPLICATION, WITH LONG-TERM CURRENT USE OF INSULIN (H): ICD-10-CM

## 2019-07-25 DIAGNOSIS — E11.9 TYPE 2 DIABETES MELLITUS WITHOUT COMPLICATION, WITH LONG-TERM CURRENT USE OF INSULIN (H): ICD-10-CM

## 2019-07-25 NOTE — TELEPHONE ENCOUNTER
"Pharmacy ([facility] [id number] [place] [facility] - [place], [place] - [address]) is calling because they state that they need a new prescription for an accu-check meter and strips. They state what they got when it was sent to them in June said \"Unspecified\" so they gave the patient what was covered by insurance at the time which was one touch. Now one touch is no longer covered and they need us to send one for accucheck, I did let her know that on our end it says we sent accu-check but she states on her end it did not.    "

## 2019-07-26 DIAGNOSIS — E11.9 TYPE 2 DIABETES MELLITUS WITHOUT COMPLICATION, WITH LONG-TERM CURRENT USE OF INSULIN (H): ICD-10-CM

## 2019-07-26 DIAGNOSIS — Z79.4 TYPE 2 DIABETES MELLITUS WITHOUT COMPLICATION, WITH LONG-TERM CURRENT USE OF INSULIN (H): ICD-10-CM

## 2019-08-06 ENCOUNTER — OFFICE VISIT (OUTPATIENT)
Dept: PHARMACY | Facility: CLINIC | Age: 59
End: 2019-08-06
Payer: COMMERCIAL

## 2019-08-06 VITALS
HEIGHT: 68 IN | BODY MASS INDEX: 26.67 KG/M2 | HEART RATE: 82 BPM | RESPIRATION RATE: 16 BRPM | OXYGEN SATURATION: 98 % | TEMPERATURE: 97.7 F | SYSTOLIC BLOOD PRESSURE: 135 MMHG | WEIGHT: 176 LBS | DIASTOLIC BLOOD PRESSURE: 73 MMHG

## 2019-08-06 DIAGNOSIS — E11.9 TYPE 2 DIABETES MELLITUS WITHOUT COMPLICATION, WITH LONG-TERM CURRENT USE OF INSULIN (H): ICD-10-CM

## 2019-08-06 DIAGNOSIS — E78.5 HYPERLIPIDEMIA LDL GOAL <100: ICD-10-CM

## 2019-08-06 DIAGNOSIS — K21.00 GASTROESOPHAGEAL REFLUX DISEASE WITH ESOPHAGITIS: ICD-10-CM

## 2019-08-06 DIAGNOSIS — Z79.4 TYPE 2 DIABETES MELLITUS WITHOUT COMPLICATION, WITH LONG-TERM CURRENT USE OF INSULIN (H): ICD-10-CM

## 2019-08-06 DIAGNOSIS — I10 BENIGN ESSENTIAL HYPERTENSION: ICD-10-CM

## 2019-08-06 DIAGNOSIS — E55.9 VITAMIN D DEFICIENCY: ICD-10-CM

## 2019-08-06 RX ORDER — ATORVASTATIN CALCIUM 40 MG/1
40 TABLET, FILM COATED ORAL DAILY
Qty: 90 TABLET | Refills: 1 | Status: SHIPPED | OUTPATIENT
Start: 2019-08-06 | End: 2020-04-06

## 2019-08-06 RX ORDER — METOPROLOL TARTRATE 100 MG
100 TABLET ORAL 2 TIMES DAILY
Qty: 60 TABLET | Refills: 5 | Status: SHIPPED | OUTPATIENT
Start: 2019-08-06 | End: 2019-12-10

## 2019-08-06 ASSESSMENT — MIFFLIN-ST. JEOR: SCORE: 1579.42

## 2019-08-06 NOTE — PATIENT INSTRUCTIONS
- Recommend to follow up with Leah on or around September 10 for an A1c reading (3 month average of blood sugars).   - A new glucometer is at your pharmacy with test strips and lancets.  They are ready for you to  with no payment needed.  - We discussed increasing exercise and that would be great.  Think about more ways to walk during the day.  - Keep up the good work with tracking your blood sugars and please bring your glucometer to our next visit.  - I sent in refills of pen needles, atorvastatin, metoprolol, metformin, rantidine.  The pharmacy has vitamin D for you.  You can ask them to fill the vitamin D.

## 2019-08-06 NOTE — NURSING NOTE
"59 year old  Chief Complaint   Patient presents with     Recheck Medication     Pt. presents to the clinic today for a medication consult.        Blood pressure 135/73, pulse 82, temperature 97.7  F (36.5  C), temperature source Oral, resp. rate 16, height 1.73 m (5' 8.1\"), weight 79.8 kg (176 lb), SpO2 98 %. Body mass index is 26.68 kg/m .  BP completed using cuff size:    Patient Active Problem List   Diagnosis     Hypercholesteremia     CARDIOVASCULAR SCREENING; LDL GOAL LESS THAN 100     Hypertension goal BP (blood pressure) < 130/80     Type 2 diabetes, HbA1C goal < 8% (H)     Advanced directives, counseling/discussion     Impingement syndrome, shoulder     Pain in joint, shoulder region     Type 2 diabetes mellitus without complication (H)     Vitamin D deficiency       Wt Readings from Last 2 Encounters:   08/06/19 79.8 kg (176 lb)   06/25/19 79.8 kg (176 lb)     BP Readings from Last 3 Encounters:   08/06/19 135/73   06/25/19 123/73   06/11/19 134/75       Allergies   Allergen Reactions     Latex Rash     Latex        Current Outpatient Medications   Medication     amLODIPine (NORVASC) 10 MG tablet     aspirin 81 MG chewable tablet     atorvastatin (LIPITOR) 40 MG tablet     blood glucose (NO BRAND SPECIFIED) test strip     blood glucose (NO BRAND SPECIFIED) test strip     blood glucose monitoring (ACCU-CHEK COMPACT CARE KIT) meter device kit     Blood Pressure Monitoring (PREMIUM AUTOMATIC BP MONITOR) CHIARA     glucose (BD GLUCOSE) 5 g chewable tablet     insulin glargine (LANTUS SOLOSTAR PEN) 100 UNIT/ML pen     insulin pen needle (31G X 8 MM) 31G X 8 MM miscellaneous     liraglutide (VICTOZA) 18 MG/3ML solution     lisinopril (PRINIVIL/ZESTRIL) 40 MG tablet     metFORMIN (GLUCOPHAGE) 500 MG tablet     metoprolol tartrate (LOPRESSOR) 100 MG tablet     ranitidine (ZANTAC) 150 MG tablet     vitamin D3 (CHOLECALCIFEROL) 1000 units (25 mcg) tablet     No current facility-administered medications for this visit. "        Social History     Tobacco Use     Smoking status: Never Smoker     Smokeless tobacco: Former User     Types: Snuff   Substance Use Topics     Alcohol use: No     Drug use: No       Honoring Choices - Health Care Directive Guide offered to patient at time of visit.    Health Maintenance Due   Topic Date Due     HEPATITIS C SCREENING  1958     COLONOSCOPY  01/01/1968     HIV SCREENING  01/01/1973     ZOSTER IMMUNIZATION (1 of 2) 01/01/2008     PREVENTIVE CARE VISIT  03/12/2014     DIABETIC FOOT EXAM  06/12/2015     EYE EXAM  06/20/2015     ADVANCE CARE PLANNING  03/12/2018       Immunization History   Administered Date(s) Administered     DTAP (<7y) 04/02/2003, 05/13/2003     DTaP, Unspecified 12/23/2011     FLU 6-35 months 12/09/2004     Flu, Unspecified 12/09/2004     Influenza (IIV3) PF 12/23/2011     Influenza Vaccine IM 3yrs+ 4 Valent IIV4 12/04/2018     MMR 04/02/2003, 05/13/2003     Pneumococcal 23 valent 12/23/2011     TDAP Vaccine (Adacel) 12/23/2011     Td (Adult), Adsorbed 04/02/2003, 05/13/2003     Tdap (Adult) Unspecified Formulation 12/23/2011       No results found for: PAP      Recent Labs   Lab Test 06/11/19  1325 06/11/19  1300 03/12/19  1207 03/12/19  1157 11/20/18  1455 11/20/18  1426 06/06/14  0945 02/18/14  0826 01/08/14  1521   A1C 8.5*  --  7.7*  --  7.0*  --  8.8* 9.1*  --    LDL  --   --   --  90  --   --  174* 110  --    HDL  --   --   --  38*  --   --  31* 35*  --    TRIG  --   --   --  102  --   --  247* 192*  --    ALT  --   --   --   --   --  24 28  --  30   CR  --  0.99  --   --   --  0.88 0.83  --  0.88   GFRESTIMATED  --  81  --   --   --  88 >90  --  90   GFRESTBLACK  --  >90  --   --   --  >90 >90  --  >90   ALBUMIN  --   --   --   --   --  4.3 4.1  --  4.1   POTASSIUM  --  4.3  --   --   --  4.5 4.4  --  4.3   TSH  --  1.35  --   --   --  1.83  --   --  1.83       PHQ-2 ( 1999 Pfizer) 6/11/2019 12/4/2018   Q1: Little interest or pleasure in doing things 2 0   Q2:  Feeling down, depressed or hopeless 1 0   PHQ-2 Score 3 0       PHQ-9 SCORE 11/20/2018 6/11/2019   PHQ-9 Total Score 4 8       SAURAV-7 SCORE 11/20/2018 6/11/2019   Total Score 6 5       No flowsheet data found.    Vannesa Nolen CMA  August 6, 2019 1:12 PM
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
## 2019-09-10 ENCOUNTER — OFFICE VISIT (OUTPATIENT)
Dept: FAMILY MEDICINE | Facility: CLINIC | Age: 59
End: 2019-09-10
Payer: COMMERCIAL

## 2019-09-10 ENCOUNTER — OFFICE VISIT (OUTPATIENT)
Dept: PHARMACY | Facility: CLINIC | Age: 59
End: 2019-09-10
Payer: COMMERCIAL

## 2019-09-10 VITALS
TEMPERATURE: 97.5 F | HEIGHT: 68 IN | DIASTOLIC BLOOD PRESSURE: 69 MMHG | OXYGEN SATURATION: 96 % | WEIGHT: 178 LBS | BODY MASS INDEX: 26.98 KG/M2 | HEART RATE: 91 BPM | SYSTOLIC BLOOD PRESSURE: 127 MMHG

## 2019-09-10 DIAGNOSIS — E55.9 VITAMIN D DEFICIENCY: ICD-10-CM

## 2019-09-10 DIAGNOSIS — I25.9 CHEST PAIN DUE TO MYOCARDIAL ISCHEMIA, UNSPECIFIED ISCHEMIC CHEST PAIN TYPE: ICD-10-CM

## 2019-09-10 DIAGNOSIS — I10 HYPERTENSION GOAL BP (BLOOD PRESSURE) < 130/80: ICD-10-CM

## 2019-09-10 DIAGNOSIS — Z79.4 TYPE 2 DIABETES MELLITUS WITH DIABETIC NEPHROPATHY, WITH LONG-TERM CURRENT USE OF INSULIN (H): ICD-10-CM

## 2019-09-10 DIAGNOSIS — Z79.4 TYPE 2 DIABETES MELLITUS WITHOUT COMPLICATION, WITH LONG-TERM CURRENT USE OF INSULIN (H): Primary | ICD-10-CM

## 2019-09-10 DIAGNOSIS — R35.0 URINARY FREQUENCY: ICD-10-CM

## 2019-09-10 DIAGNOSIS — E11.9 TYPE 2 DIABETES MELLITUS WITHOUT COMPLICATION, WITH LONG-TERM CURRENT USE OF INSULIN (H): Primary | ICD-10-CM

## 2019-09-10 DIAGNOSIS — E11.21 TYPE 2 DIABETES MELLITUS WITH DIABETIC NEPHROPATHY, WITH LONG-TERM CURRENT USE OF INSULIN (H): ICD-10-CM

## 2019-09-10 LAB
BILIRUBIN UR: NEGATIVE MG/DL
BLOOD UR: NEGATIVE MG/DL
CLARITY, URINE: CLEAR
COLOR UR: YELLOW
CREAT UR-MCNC: 264 MG/DL
GLUCOSE URINE: NORMAL MG/DL
HBA1C MFR BLD: 8 % (ref 4.1–5.7)
KETONES UR QL: NORMAL MG/DL
LEUKOCYTE ESTERASE UR: NEGATIVE U/L
MICROALBUMIN UR-MCNC: 25 MG/L
MICROALBUMIN/CREAT UR: 9.51 MG/G CR (ref 0–17)
NITRITE UR QL STRIP: NEGATIVE MG/DL
PH UR STRIP: 5 [PH] (ref 4.5–8)
PROTEIN UR: NEGATIVE MG/DL
SP GR UR STRIP: >=1.03 (ref 1–1.03)
UROBILINOGEN UR STRIP-ACNC: NORMAL E.U./DL

## 2019-09-10 RX ORDER — NITROGLYCERIN 0.4 MG/1
0.4 TABLET SUBLINGUAL EVERY 5 MIN PRN
Qty: 25 TABLET | Refills: 0 | Status: SHIPPED | OUTPATIENT
Start: 2019-09-10 | End: 2020-09-08

## 2019-09-10 RX ORDER — LIRAGLUTIDE 6 MG/ML
1.2 INJECTION SUBCUTANEOUS DAILY
Qty: 6 ML | Refills: 2 | Status: SHIPPED | OUTPATIENT
Start: 2019-09-10 | End: 2019-12-10

## 2019-09-10 SDOH — HEALTH STABILITY: PHYSICAL HEALTH: ON AVERAGE, HOW MANY MINUTES DO YOU ENGAGE IN EXERCISE AT THIS LEVEL?: 20 MIN

## 2019-09-10 ASSESSMENT — MIFFLIN-ST. JEOR: SCORE: 1585.31

## 2019-09-10 NOTE — PROGRESS NOTES
"       HPI       Phani JOSE Jeronimo is a 59 year old  who presents for   Chief Complaint   Patient presents with     Diabetes     F/U no concerns \"it's fine\"     59 year-old male presents to clinic for follow up of DM type 2. He reports his blood sugars range from a low of 80 to 140-150. His last A1C 3 months ago with 8.0. He is taking all his medications regularly. He has not experienced signs and symptoms of hypoglycemia.   He has experienced chest pain in the left upper chest described as a little twinge periodically, not necessarily related to activity. He does not feel like this is due to his heart. He reports he has had that in the past and used to take nitroglycerine. He does not currently have any of this.    He has noted increased urination and increased thirst recently, but denies abdominal pain, flank pain, nausea or fever.  He has a history of abluminuria which has slowly been worsening.      Health Maintenance  He is due for eye exam and dental exam within the next 1-2 months. Recent FIT test 12/18 was negative.         Problem, Medication and Allergy Lists were  Current Outpatient Medications   Medication     amLODIPine (NORVASC) 10 MG tablet     aspirin 81 MG chewable tablet     atorvastatin (LIPITOR) 40 MG tablet     blood glucose (NO BRAND SPECIFIED) test strip     blood glucose (NO BRAND SPECIFIED) test strip     blood glucose monitoring (ACCU-CHEK COMPACT CARE KIT) meter device kit     Blood Pressure Monitoring (PREMIUM AUTOMATIC BP MONITOR) CHIARA     glucose (BD GLUCOSE) 5 g chewable tablet     insulin pen needle (31G X 8 MM) 31G X 8 MM miscellaneous     liraglutide (VICTOZA) 18 MG/3ML solution     lisinopril (PRINIVIL/ZESTRIL) 40 MG tablet     metFORMIN (GLUCOPHAGE) 500 MG tablet     metoprolol tartrate (LOPRESSOR) 100 MG tablet     nitroGLYcerin (NITROSTAT) 0.4 MG sublingual tablet     ranitidine (ZANTAC) 150 MG tablet     vitamin D3 (CHOLECALCIFEROL) 1000 units (25 mcg) tablet     insulin " glargine (LANTUS PEN) 100 UNIT/ML pen     No current facility-administered medications for this visit.         Allergies   Allergen Reactions     Latex Rash     Latex      Patient is   an established patient of this clinic.  Past Medical History:   Diagnosis Date     Albuminuria      Diabetes mellitus (H)      GERD (gastroesophageal reflux disease)      Hypercholesteremia      Hypertension    Surgical History: Bilateral cataract repair per patient  Family History   Problem Relation Age of Onset     Diabetes Mother          late 50's     Diabetes Father          54, Heart attack     Eye Disorder Father      Heart Disease Father      Heart Disease Paternal Uncle      Social History     Socioeconomic History     Marital status: Single     Spouse name: None     Number of children: None     Years of education: None     Highest education level: None   Occupational History     Occupation: unemployed   Social Needs     Financial resource strain: None     Food insecurity:     Worry: None     Inability: None     Transportation needs:     Medical: None     Non-medical: None   Tobacco Use     Smoking status: Never Smoker     Smokeless tobacco: Former User     Types: Snuff   Substance and Sexual Activity     Alcohol use: No     Drug use: No     Sexual activity: Not Currently   Lifestyle     Physical activity:     Days per week: 5 days     Minutes per session: 20 min     Stress: None   Relationships     Social connections:     Talks on phone: None     Gets together: None     Attends Buddhism service: None     Active member of club or organization: None     Attends meetings of clubs or organizations: None     Relationship status: None     Intimate partner violence:     Fear of current or ex partner: None     Emotionally abused: None     Physically abused: None     Forced sexual activity: None   Other Topics Concern     Parent/sibling w/ CABG, MI or angioplasty before 65F 55M? No   Social History Narrative     "Currently unemployed. Lives in neighborhood        Walks most days.    Cooks own meals.     Currently does not have phone. Working on replacing.    .         Review of Systems:   Review of Systems  GEN: denies fever. Sleep varies: sometimes cannot fall asleep or stay asleep. Has appetite but then gets food and does not feel hungry. Eating frequent small meals per day. Still eating fair number of carbohydrates: bread, anjera, okra. Walking 30 minutes roughly daily.   HEENT: sees a squiggly line in left eye sometimes. Last eye exam about 5 months ago. Had bilateraly catarract repair in past but does not feel like left eye improved that much. Nose runs after walking. Dental: about 6 months ago. Had teeth pulled. Would like to get dentures or partials. Bites tongue and cheek a lot because missing left lower teeth.   CV: chest pain as per HPI. Denies irregular heart beats  RESP: negative for SOB, cough.   ABD: appetite as per HPI. Denies pain, nausea, vomiting, heartburn or reflux or stool pattern change. Stool is a little loose but better than previous.   : increased urinary frequency, denies dysuria, hesitancy. Occasional nocturia. Last PSA 12/2018 WNL.  ENDO: as per HPI  NEURO: denies headache.   HEME: history slightly elevated white count without evidence of active infection  Renal: history albuminuria       Physical Exam:     Vitals:    09/10/19 1312   BP: 127/69   Pulse: 91   Temp: 97.5  F (36.4  C)   TempSrc: Oral   SpO2: 96%   Weight: 80.7 kg (178 lb)   Height: 1.725 m (5' 7.9\")     Body mass index is 27.14 kg/m .  Vital signs normal except BMI slightly elevated     Physical Exam  GEN: alert male in no acute distress. Weight up 2 pounds from June weight  HEENT: Eyes clear, STACIE, EOM intact. Discs crisp bilaterally  RESP: lungs clear to auscultation  CV: HRRR, S1, S2, no MRG  ABD: Rounded, slightly distended with BSx4, no HSM; nontender.  Negative CVAT. Negative SP tenderness  Diabetic foot exam: pink " warm, thickened toenails. Right great toenail split down center but intact overall. No lesions or callouses noted. Cap refill < 3 seconds.  CMS intact to monofilamen except decreased right 2nd toe tip.     Results:      Results from this visit  Results for orders placed or performed in visit on 09/10/19   Hemoglobin A1c ([facility])   Result Value Ref Range    Hemoglobin A1C 8.0 (H) 4.1 - 5.7 %   Urinalysis, Micro If (UA) ([facility])   Result Value Ref Range    Leukocyte Esterase UR Negative Negative U/l    Nitrite Urine Negative Negative mg/dL    Protein UR Negative Negative mg/dL    Glucose Urine 2+ Negative mg/dL    Ketones Urine Trace Negative mg/dL    Urobilinogen mg/dL 0.2 E.U./dL 0.2 E.U./dL    Bilirubin UR Negative Negative mg/dL    Blood UR Negative Negative mg/dL    Specific Gravity Urine >=1.030 1.005 - 1.030    pH Urine 5.0 4.5 - 8.0    Color Urine Yellow Yellow    Clarity, urine Clear Clear       Assessment and Plan        1. Type 2 diabetes mellitus without complication, with long-term current use of insulin (H)  Glargine insulin was increased to 24 units twice daily in consultation with Pharm D. This was reviewed with patient who did teachback to review dose increase. He will follow with PharmD in 2 weeks. He is to bring glucometer with him at that visit to check against blood sugar in clinic.   - Hemoglobin A1c ([facility])  - Albumin Random Urine Quantitative with Creat Ratio  - liraglutide (VICTOZA) 18 MG/3ML solution; Inject 1.2 mg Subcutaneous daily  Dispense: 6 mL; Refill: 2  - EKG 12-lead complete w/read - Clinics  Next follow up with provider is in 3 weeks.   Spent time reviewing healthy diet and exercise. Goal is 30 minutes of exercise 5-7 times per week, increase vegetables, low fat, low salt diet. There is slight language barrier.  was offered, but patient declines. He is able to teachback on primary points. Dietary understanding about carbohydrates less clear.    Recommend weight loss and/or weight maintenance.    2. Urinary frequency  Discussed with patient possible etiologies. Likely due to increased blood sugar as urine not suggestive of UTI. Monitor. Repeat urina albumin as history of albuminuria  - Urinalysis, Micro If (UA) ([facility])    3. Vitamin D deficiency  Continue daily dose  - vitamin D3 (CHOLECALCIFEROL) 1000 units (25 mcg) tablet; Take 1 tablet (1,000 Units) by mouth daily  Dispense: 100 tablet; Refill: 3    4. Chest pain due to myocardial ischemia, unspecified ischemic chest pain type  Suspect this is not cardiac related, but he does have history of this. EKG was unchanged from previous and no evidence of ischemia or changes.  Patient would like back up NTG.  Discussed with pharmD. Education provided re: use and side effects. Patient verbalizes understanding.   - nitroGLYcerin (NITROSTAT) 0.4 MG sublingual tablet; Place 1 tablet (0.4 mg) under the tongue every 5 minutes as needed for chest pain for 3 doses. If symptoms persist 5 minutes after 1st dose call 911.  Dispense: 25 tablet; Refill: 0  - EKG 12-lead complete w/read - Clinics    5. Health Maintenance  Schedule dental and eye appointments       Medications Discontinued During This Encounter   Medication Reason     vitamin D3 (CHOLECALCIFEROL) 1000 units (25 mcg) tablet Reorder     liraglutide (VICTOZA) 18 MG/3ML solution Reorder       Options for treatment and follow-up care were reviewed with the patient. Phani Jeronimo  engaged in the decision making process and verbalized understanding of the options discussed and agreed with the final plan.    Leah Ford, RICARDO CNP

## 2019-09-10 NOTE — LETTER
September 12, 2019       TO: Phani Jeronimo  [address]   [facility] [address]       DearMr.Jumana Jeronimo,    We are writing to inform you of your test results.    Test results indicate you may require additional follow up, see comment below.    Resulted Orders   Hemoglobin A1c ([facility])   Result Value Ref Range    Hemoglobin A1C 8.0 (H) 4.1 - 5.7 %   Albumin Random Urine Quantitative with Creat Ratio   Result Value Ref Range    Creatinine Urine 264 mg/dL    Albumin Urine mg/L 25 mg/L    Albumin Urine mg/g Cr 9.51 0 - 17 mg/g Cr   Urinalysis, Micro If (UA) ([facility])   Result Value Ref Range    Leukocyte Esterase UR Negative Negative U/l    Nitrite Urine Negative Negative mg/dL    Protein UR Negative Negative mg/dL    Glucose Urine 2+ Negative mg/dL    Ketones Urine Trace Negative mg/dL    Urobilinogen mg/dL 0.2 E.U./dL 0.2 E.U./dL    Bilirubin UR Negative Negative mg/dL    Blood UR Negative Negative mg/dL    Specific Gravity Urine >=1.030 1.005 - 1.030    pH Urine 5.0 4.5 - 8.0    Color Urine Yellow Yellow    Clarity, urine Clear Clear       Your lab results show improvement in your kidney function: the amount of protein in your urine is decreased from previous. Your 3 month blood sugar average has improved slightly as well (down to 8.0 from 8.5). Continued attention to diet, exercise, weight control as we discussed at your visit is recommended. We did increase your glargine insulin to 24 units twice daily. Please follow with the pharmacist as planned and with me in 3 months. Please let me know if you have any questions. Thank you,   Leah SILVA CNP

## 2019-09-10 NOTE — PROGRESS NOTES
"SUBJECTIVE: Phani is a 59 year old male who was referred by Leah Ford for [facility] services for diabetes management.  Today Phani presents for follow up.    Type 2 Diabetes Mellitus   Phani is taking metformin, insulin glargine, and liraglutide  Phani also reports that he is often hungry but cannot eat full meals. His appetite \"disappears\" once the food is in front of him.   He reports having SMBG levels ranging from  mg/dL in the morning. He reports post-prandial levels from 140-180 mg/dL.  When his blood glucose was in the 80s, he felt hungry, but no other symptoms.  Denies other signs/symptoms of hypoglycemia.  Phani did not bring his glucometer into clinic today.  Phani reports that he feels \"pain\" when his glucose is high, and recent changes in his urine including increased frequency and changed color of his urine.     HTN: Phani reports he continues to take amlodipine, metoprolol, and lisinopril.     Environmental factors that impact patient   Phani speaks English well, but language is a barrier to both Phani's understanding and health care providers' understanding of conversation during the visit. While it takes time and ongoing explanations for Phani to understand medical recommendations, it seems he eventually understands the therapies and goals recommended to him. He is willing to try recommendations and is able to be compliant with medication regimens.    He expressed interest in initiating mail order pharmacy at past visits due to his ongoing problems with obtaining medications from his current pharmacy.  He is still interested in mail order pharmacy.  He is also without a phone. He had a government phone, which has recently been disconnected; this complicates care and potential mail order pharmacy enrollment.         Patient reports being compliant most of the time.    Patient Active Problem List   Diagnosis     Hypercholesteremia     CARDIOVASCULAR SCREENING; LDL GOAL LESS THAN 100     Hypertension goal BP " "(blood pressure) < 130/80     Type 2 diabetes, HbA1C goal < 8% (H)     Advanced directives, counseling/discussion     Impingement syndrome, shoulder     Pain in joint, shoulder region     Type 2 diabetes mellitus without complication (H)     Vitamin D deficiency        OBJECTIVE:     SAURAV-7 SCORE 11/20/2018 6/11/2019   Total Score 6 5       PHQ-9 SCORE 11/20/2018 6/11/2019   PHQ-9 Total Score 4 8         VITALS:  BP Readings from Last 3 Encounters:   09/10/19 127/69   08/06/19 135/73   06/25/19 123/73           Weight:   Wt Readings from Last 1 Encounters:   09/10/19 178 lb (80.7 kg)       Height:   Ht Readings from Last 1 Encounters:   09/10/19 5' 7.9\" (172.5 cm)       LABORATORY VALUES:   Last A1C:    Lab Results   Component Value Date    A1C 8.0 09/10/2019   .    Last Basic Metabolic Panel:  Lab Results   Component Value Date     06/11/2019      Lab Results   Component Value Date    POTASSIUM 4.3 06/11/2019     Lab Results   Component Value Date    CHLORIDE 109 06/11/2019     Lab Results   Component Value Date    LIU 9.6 06/11/2019     Lab Results   Component Value Date    CO2 22 06/11/2019     Lab Results   Component Value Date    BUN 20 06/11/2019     Lab Results   Component Value Date    CR 0.99 06/11/2019     Lab Results   Component Value Date     06/11/2019       Lipid Panel Labs  Lab Results   Component Value Date    CHOL 148 03/12/2019    CHOL 255 06/06/2014     Lab Results   Component Value Date    TRIG 102 03/12/2019    TRIG 247 06/06/2014     Lab Results   Component Value Date    HDL 38 03/12/2019    HDL 31 06/06/2014     Lab Results   Component Value Date    LDL 90 03/12/2019     06/06/2014       Hepatic Panel Labs  Lab Results   Component Value Date    AST 13 11/20/2018     Lab Results   Component Value Date    ALT 24 11/20/2018         SOCIAL AND FAMILY HISTORY  Social History     Tobacco Use     Smoking status: Never Smoker     Smokeless tobacco: Former User     Types: Snuff "   Substance Use Topics     Alcohol use: No    .  Most Recent Immunizations   Administered Date(s) Administered     DTAP (<7y) 05/13/2003     DTaP, Unspecified 12/23/2011     FLU 6-35 months 12/09/2004     Flu, Unspecified 12/09/2004     Influenza (IIV3) PF 12/23/2011     Influenza Vaccine IM > 6 months Valent IIV4 12/04/2018     MMR 05/13/2003     Pneumococcal 23 valent 12/23/2011     TDAP Vaccine (Adacel) 12/23/2011     Td (Adult), Adsorbed 05/13/2003     Tdap (Adult) Unspecified Formulation 12/23/2011       CURRENT MEDICATIONS:   Current Outpatient Medications   Medication Sig Dispense Refill     amLODIPine (NORVASC) 10 MG tablet Take 1 tablet (10 mg) by mouth daily 90 tablet 1     aspirin 81 MG chewable tablet Take 1 tablet (81 mg) by mouth daily 100 tablet 1     atorvastatin (LIPITOR) 40 MG tablet Take 1 tablet (40 mg) by mouth daily 90 tablet 1     blood glucose (NO BRAND SPECIFIED) test strip Use to test blood sugar 2 times daily or as directed. 100 each 1     blood glucose (NO BRAND SPECIFIED) test strip Test twice daily. 2 Box 2     blood glucose monitoring (ACCU-CHEK COMPACT CARE KIT) meter device kit Use to test blood sugars 1-2 times daily or as directed. 1 kit 0     Blood Pressure Monitoring (PREMIUM AUTOMATIC BP MONITOR) CHIARA 1 Device daily 1 each 0     glucose (BD GLUCOSE) 5 g chewable tablet Take 2 tablets (10 g) by mouth as needed (prn blood sugar < 70) 15 tablet 0     insulin glargine (LANTUS SOLOSTAR PEN) 100 UNIT/ML pen Inject 22 Units Subcutaneous 2 times daily 15 mL 2     insulin pen needle (31G X 8 MM) 31G X 8 MM miscellaneous Use to inject insulin twice daily and Victoza once daily 100 each 11     liraglutide (VICTOZA) 18 MG/3ML solution Inject 1.2 mg Subcutaneous daily 6 mL 2     lisinopril (PRINIVIL/ZESTRIL) 40 MG tablet Take 1 tablet (40 mg) by mouth daily 90 tablet 1     metFORMIN (GLUCOPHAGE) 500 MG tablet Take 2 tablets (1,000 mg) by mouth 2 times daily (with meals) 120 tablet 2      metoprolol tartrate (LOPRESSOR) 100 MG tablet Take 1 tablet (100 mg) by mouth 2 times daily 60 tablet 5     nitroGLYcerin (NITROSTAT) 0.4 MG sublingual tablet Place 1 tablet (0.4 mg) under the tongue every 5 minutes as needed for chest pain for 3 doses. If symptoms persist 5 minutes after 1st dose call 911. 25 tablet 0     ranitidine (ZANTAC) 150 MG tablet Take 1 tablet (150 mg) by mouth 2 times daily 60 tablet 5     vitamin D3 (CHOLECALCIFEROL) 1000 units (25 mcg) tablet Take 1 tablet (1,000 Units) by mouth daily 100 tablet 3       ALLERGIES:     Allergies   Allergen Reactions     Latex Rash     Latex           ASSESSMENT:    Type 2 Diabetes Mellitus  Today, Phani's HbA1c is 8.0, down from 8.5 in June 2019. Once Phani understood the options available to help to reach his goal of 7.0, he felt most comfortable increasing his basal insulin to 24 units twice daily.     Other options explained to Phani included: increasing Victoza to 1.8 mg daily; adding a sulfonylurea; or adding a meal time/fast acting insulin at his largest meal. Phani was resistant to increasing Victoza, citing that he previously had diarrhea on this dose. He was also hesitant to add a sulfonylurea following an explanation of the adverse effects associated with this medication. He was amenable to adding a dose of bolus insulin; however, with the communication/language barriers present it may not be safe to add a short acting insulin given the risk of hypoglycemia. This risk is significant given Phani's reported fasting blood glucose levels. It may be beneficial to check his blood glucose levels on his glucometer at his next visit.      It is possible the urinary changes Phani is experiencing are related to his diabetes or an infection.  MTP: Dose too low    HTN: At goal. Goal <130/80    Communication/environmental barriers-not optimized  Phani was offered an  at his next visit. He mentioned that he was not currently interested and feels that his  understanding is actually better without an interpretor. He is planning to get a new [employer] phone. Once he has a phone, mail order pharmacy services can be initiated which will allow for medication synchronization and potentially decrease confusion around medication that Phani has experienced in the past.    All medications were reviewed and found to be indicated, effective, safe and convenient unless drug therapy problem identified as described above.    PLAN:     1. Increase insulin glargine to 24 units twice daily.  2. Follow up visit in 2 weeks to assess SMBG readings, set up mail order pharmacy (if Phani has been able to activate a phone).  3. Urinalysis was performed by Leah Ford DNP today.  Please see her note for details.     Options for treatment and/or follow-up care were reviewed with the patient. Phani Jeronimo was engaged and actively involved in the decision making process. He/She verbalized understanding of the options discussed and was satisfied with the final plan.    Patient was provided with written instructions/medication list via AVS.       Medical conditions reviewed: 2    Medications reviewed: 12    MTP identified: 1    Time spent: 90 minutes     Level of service:2

## 2019-09-10 NOTE — PATIENT INSTRUCTIONS
Diabetes  Increase glargine (Lantus) insulin to 24 units twice daily  Lenora, the pharmacist will follow up with you in 2 weeks  Monitor your blood sugars early morning and at night  Continue other medications  Repeat random urine albumin today  A1c decreased to 8.0 from 8.5 which is good  Continue to work on diet: low fat, low salt, low carbohydrate diet. Eat frequent smaller meals if that is working better for you.    Nitroglycerin 0.4mg under the tongue ordered when develops chest pain with activities like walking and going up stairs.This can be repeated every 5 minutes up to 3 doses, but if your pain does not go away after 1 tablet, then call 911  EKG Today shows: no change from previous: normal sinus rhythm    Urinary frequency  Might be due to increased blood sugar.  Will check UA/UC today    Health Maintenance  Schedule dental and eye appointments

## 2019-09-24 ENCOUNTER — OFFICE VISIT (OUTPATIENT)
Dept: PHARMACY | Facility: CLINIC | Age: 59
End: 2019-09-24
Payer: COMMERCIAL

## 2019-09-24 VITALS
DIASTOLIC BLOOD PRESSURE: 74 MMHG | WEIGHT: 177.6 LBS | HEART RATE: 83 BPM | HEIGHT: 68 IN | TEMPERATURE: 97.9 F | OXYGEN SATURATION: 95 % | SYSTOLIC BLOOD PRESSURE: 157 MMHG | BODY MASS INDEX: 26.92 KG/M2

## 2019-09-24 DIAGNOSIS — E11.9 TYPE 2 DIABETES MELLITUS WITHOUT COMPLICATION, WITH LONG-TERM CURRENT USE OF INSULIN (H): Primary | ICD-10-CM

## 2019-09-24 DIAGNOSIS — Z79.4 TYPE 2 DIABETES MELLITUS WITHOUT COMPLICATION, WITH LONG-TERM CURRENT USE OF INSULIN (H): Primary | ICD-10-CM

## 2019-09-24 ASSESSMENT — MIFFLIN-ST. JEOR: SCORE: 1581.92

## 2019-09-24 NOTE — PROGRESS NOTES
"SUBJECTIVE: Phani is a 59 year old male who was referred by Dr. Leah Ford for [facility] services for Type 2 Diabetes.      Environmental factors that impact patient: Phani is unemployed and does not have a phone, which complicates his care.    Patient reports being compliant all of the time.     Type 2 Diabetes:  Phani brought his glucometer to clinic today, and has been keeping track of his blood glucose measurements. He mentioned and his glucometer confirms that his blood sugar was 65 this morning. He reports \"feeling shaky, tired, and hungry\" at this time. In response, he ate raisins and felt better. He also cites that he had been having diarrhea since he ate fish \"that was smelly\" last night for supper. His most common morning fasting readings on his glucometer are 97, 91, and 83. His glucometer post-prandial nighttime readings are in the 130's and 140's.     Sleep:  Phani reports that he struggles with sleep. He cites that he is stressed and that he watches television before bed. He reports having difficulty going to sleep, waking up nightly and having difficulty maintaing sleep.    Skin Concerns:  Phani was concerned about scratches on his skin on his left shoulder and right leg today. He also had concerns about a blister on his thumb. He mentioned that the scratches on the shoulder and leg are itchy, and that the thumb blister is painful at times. He mentions that the thumb blister has been present for 2 months and is shrinking in size. He cites that he uses vaseline on the scratches, which helps.    Social Barriers to Care:  Phani has expressed repeated interest in mail order pharmacy, but does not have a phone. He mentioned that he attempted getting a phone after our last visit, but was confused when the online applications asked him for a phone number.     Review of Potential Medication Side Effects:      Patient Active Problem List   Diagnosis     Hypercholesteremia     CARDIOVASCULAR SCREENING; LDL GOAL LESS " "THAN 100     Hypertension goal BP (blood pressure) < 130/80     Type 2 diabetes, HbA1C goal < 8% (H)     Advanced directives, counseling/discussion     Impingement syndrome, shoulder     Pain in joint, shoulder region     Type 2 diabetes mellitus without complication (H)     Vitamin D deficiency        OBJECTIVE:     SAURAV-7 SCORE 11/20/2018 6/11/2019   Total Score 6 5       PHQ-9 SCORE 11/20/2018 6/11/2019   PHQ-9 Total Score 4 8         VITALS:  BP Readings from Last 3 Encounters:   09/24/19 (!) 157/74   09/10/19 127/69   08/06/19 135/73           Weight:   Wt Readings from Last 1 Encounters:   09/24/19 177 lb 9.6 oz (80.6 kg)       Height:   Ht Readings from Last 1 Encounters:   09/24/19 5' 7.8\" (172.2 cm)       LABORATORY VALUES:   Last A1C:    Lab Results   Component Value Date    A1C 8.0 09/10/2019   .    Last Basic Metabolic Panel:  Lab Results   Component Value Date     06/11/2019      Lab Results   Component Value Date    POTASSIUM 4.3 06/11/2019     Lab Results   Component Value Date    CHLORIDE 109 06/11/2019     Lab Results   Component Value Date    LIU 9.6 06/11/2019     Lab Results   Component Value Date    CO2 22 06/11/2019     Lab Results   Component Value Date    BUN 20 06/11/2019     Lab Results   Component Value Date    CR 0.99 06/11/2019     Lab Results   Component Value Date     06/11/2019       Lipid Panel Labs  Lab Results   Component Value Date    CHOL 148 03/12/2019    CHOL 255 06/06/2014     Lab Results   Component Value Date    TRIG 102 03/12/2019    TRIG 247 06/06/2014     Lab Results   Component Value Date    HDL 38 03/12/2019    HDL 31 06/06/2014     Lab Results   Component Value Date    LDL 90 03/12/2019     06/06/2014       Hepatic Panel Labs  Lab Results   Component Value Date    AST 13 11/20/2018     Lab Results   Component Value Date    ALT 24 11/20/2018         SOCIAL AND FAMILY HISTORY  Social History     Tobacco Use     Smoking status: Never Smoker     " Smokeless tobacco: Former User     Types: Snuff   Substance Use Topics     Alcohol use: No    .  Most Recent Immunizations   Administered Date(s) Administered     DTAP (<7y) 05/13/2003     DTaP, Unspecified 12/23/2011     FLU 6-35 months 12/09/2004     Flu, Unspecified 12/09/2004     Influenza (IIV3) PF 12/23/2011     Influenza Vaccine IM > 6 months Valent IIV4 12/04/2018     MMR 05/13/2003     Pneumococcal 23 valent 12/23/2011     TDAP Vaccine (Adacel) 12/23/2011     Td (Adult), Adsorbed 05/13/2003     Tdap (Adult) Unspecified Formulation 12/23/2011       CURRENT MEDICATIONS:   Current Outpatient Medications   Medication Sig Dispense Refill     amLODIPine (NORVASC) 10 MG tablet Take 1 tablet (10 mg) by mouth daily 90 tablet 1     aspirin 81 MG chewable tablet Take 1 tablet (81 mg) by mouth daily 100 tablet 1     atorvastatin (LIPITOR) 40 MG tablet Take 1 tablet (40 mg) by mouth daily 90 tablet 1     blood glucose (NO BRAND SPECIFIED) test strip Use to test blood sugar 2 times daily or as directed. 100 each 1     blood glucose (NO BRAND SPECIFIED) test strip Test twice daily. 2 Box 2     blood glucose monitoring (ACCU-CHEK COMPACT CARE KIT) meter device kit Use to test blood sugars 1-2 times daily or as directed. 1 kit 0     Blood Pressure Monitoring (PREMIUM AUTOMATIC BP MONITOR) CHIARA 1 Device daily 1 each 0     glucose (BD GLUCOSE) 5 g chewable tablet Take 2 tablets (10 g) by mouth as needed (prn blood sugar < 70) 15 tablet 0     insulin glargine (LANTUS PEN) 100 UNIT/ML pen Inject 24 Units Subcutaneous 2 times daily 15 mL 2     insulin pen needle (31G X 8 MM) 31G X 8 MM miscellaneous Use to inject insulin twice daily and Victoza once daily 100 each 11     liraglutide (VICTOZA) 18 MG/3ML solution Inject 1.2 mg Subcutaneous daily 6 mL 2     lisinopril (PRINIVIL/ZESTRIL) 40 MG tablet Take 1 tablet (40 mg) by mouth daily 90 tablet 1     metFORMIN (GLUCOPHAGE) 500 MG tablet Take 2 tablets (1,000 mg) by mouth 2 times  daily (with meals) 120 tablet 2     metoprolol tartrate (LOPRESSOR) 100 MG tablet Take 1 tablet (100 mg) by mouth 2 times daily 60 tablet 5     nitroGLYcerin (NITROSTAT) 0.4 MG sublingual tablet Place 1 tablet (0.4 mg) under the tongue every 5 minutes as needed for chest pain for 3 doses. If symptoms persist 5 minutes after 1st dose call 911. 25 tablet 0     ranitidine (ZANTAC) 150 MG tablet Take 1 tablet (150 mg) by mouth 2 times daily 60 tablet 5     vitamin D3 (CHOLECALCIFEROL) 1000 units (25 mcg) tablet Take 1 tablet (1,000 Units) by mouth daily 100 tablet 3       ALLERGIES:     Allergies   Allergen Reactions     Latex Rash     Latex           ASSESSMENT:    Type 2 Diabetes: not optimized-needs medication counseling  Outside of his one low blood glucose this morning, Phani's blood glucose has been at goal. Phani's fasting blood glucose goal is  mg/dL, and his post-prandial goal is <180 mg/dL. Phani's low blood glucose may have been related to his diarrhea and low carbohydrate ingestion the prior night. Phani was amenable to re-education on how to treat hypoglycemia. Since the low glucose could be explained by sickness, it seems that Phani's new insulin regimen has been effective for him. He is able to verbalize his dosing regimen and how to use his insulin, Victoza, and metformin.    Sleep: not optimized-no drug therapy problem  Phani is not sleeping well, which decreases his quality of life and complicates diabetes management. However, he is not too amenable to changing some sleep hygiene habits such as watching TV before bed.    Skin Concerns: stable  Phani's thumb blister and skin wounds were triaged by Dr. Ford. Dr. Ford concluded that the blister is likely healing since it is shrinking, and that the scratches may be associated with dry skin.    Social Barriers to Care: not optimized- no drug therapy problem  Phani does not have a phone, which inhibits his ability to apply for a mail order pharmacy.      All medications were reviewed and found to be indicated, effective, safe and convenient unless drug therapy problem identified as described above.    PLAN:     1. Continue Lantus 24 units twice daily. If lows/hypoglycemia occur, treat the low as directed in clinic, and follow up as soon as possible with clinic for insulin dose adjustments.    2. Today, Phani was walked through the [employer] free phone application by the pharmacist and student. Submit proof of eligibility as soon as possible to receive a phone. Can try other resources if [employer] does not work. Resources: [employer] [phone]; [employer] [phone]; [employer] [phone]; [phone]; Telephone [employer] [phone]; [employer] [phone].    3. Limit television time at least 2 hours before bed to assist with falling asleep.    4. As directed by Dr. Ford--Continue to use vaseline on scratches, and soak thumb in warm soap water to promote healing.\    5. Future: Help Phani apply for a mail order pharmacy service.    Options for treatment and/or follow-up care were reviewed with the patient. Phani Jeronimo was engaged and actively involved in the decision making process. He/She verbalized understanding of the options discussed and was satisfied with the final plan.    Patient was provided with written instructions/medication list via AVS.       Medical conditions reviewed: 3    Medications reviewed:  12    MTP identified: 0    Time spent: 60 minutes    Level of service: 1    I was present with the pharmacy student who participated in the service and in the documentation of this note. I have verified the history, personally performed the medical decision making, and have verified the content of the note, which accurately reflects my assessment of the patient and the plan of care. Lenora Mo, PharmD

## 2019-09-24 NOTE — PATIENT INSTRUCTIONS
- Please continue to use the same dose of insulin.  If you are having low blood sugars (less than 70), please come in for a visit sooner.  - We helped you to fill out an application for a free phone through [employer]. [employer] sent you an email and you will need to submit documents to them through email to finish the process.  - Continue to use vaseline on your arms and legs.  - Soak your thumb twice daily in warm water.  If you see green puss, then you should come in for a visit.   - Please schedule a follow up visit with Nicki in 4 weeks

## 2019-11-01 ENCOUNTER — ALLIED HEALTH/NURSE VISIT (OUTPATIENT)
Dept: FAMILY MEDICINE | Facility: CLINIC | Age: 59
End: 2019-11-01
Payer: COMMERCIAL

## 2019-11-01 DIAGNOSIS — Z23 NEED FOR INFLUENZA VACCINATION: Primary | ICD-10-CM

## 2019-11-12 DIAGNOSIS — I10 BENIGN ESSENTIAL HYPERTENSION: ICD-10-CM

## 2019-11-12 NOTE — TELEPHONE ENCOUNTER
aspirin (ASA) 81 MG chewable tablet      Last Written Prescription Date:  11-20-18  Last Fill Quantity: 100,   # refills: 1  Last Office Visit : 9-10-19  Future Office visit:  12-10-19    Routing refill request to provider for review/approval because:  Associated diagnosis not on protocol

## 2019-11-14 RX ORDER — ASPIRIN 81 MG/1
81 TABLET, CHEWABLE ORAL DAILY
Qty: 100 TABLET | Refills: 2 | Status: SHIPPED | OUTPATIENT
Start: 2019-11-14 | End: 2019-12-10

## 2019-12-10 ENCOUNTER — OFFICE VISIT (OUTPATIENT)
Dept: FAMILY MEDICINE | Facility: CLINIC | Age: 59
End: 2019-12-10
Payer: COMMERCIAL

## 2019-12-10 VITALS
SYSTOLIC BLOOD PRESSURE: 132 MMHG | HEART RATE: 89 BPM | DIASTOLIC BLOOD PRESSURE: 71 MMHG | OXYGEN SATURATION: 97 % | BODY MASS INDEX: 25.91 KG/M2 | HEIGHT: 68 IN | WEIGHT: 171 LBS | TEMPERATURE: 97.4 F | RESPIRATION RATE: 16 BRPM

## 2019-12-10 DIAGNOSIS — K21.00 GASTROESOPHAGEAL REFLUX DISEASE WITH ESOPHAGITIS: ICD-10-CM

## 2019-12-10 DIAGNOSIS — Z12.11 SPECIAL SCREENING FOR MALIGNANT NEOPLASMS, COLON: Primary | ICD-10-CM

## 2019-12-10 DIAGNOSIS — Z79.4 TYPE 2 DIABETES MELLITUS WITH HYPOGLYCEMIA WITHOUT COMA, WITH LONG-TERM CURRENT USE OF INSULIN (H): ICD-10-CM

## 2019-12-10 DIAGNOSIS — E11.649 TYPE 2 DIABETES MELLITUS WITH HYPOGLYCEMIA WITHOUT COMA, WITH LONG-TERM CURRENT USE OF INSULIN (H): ICD-10-CM

## 2019-12-10 DIAGNOSIS — I10 HYPERTENSION GOAL BP (BLOOD PRESSURE) < 130/80: ICD-10-CM

## 2019-12-10 DIAGNOSIS — Z79.4 TYPE 2 DIABETES MELLITUS WITH DIABETIC NEPHROPATHY, WITH LONG-TERM CURRENT USE OF INSULIN (H): ICD-10-CM

## 2019-12-10 DIAGNOSIS — E11.21 TYPE 2 DIABETES MELLITUS WITH DIABETIC NEPHROPATHY, WITH LONG-TERM CURRENT USE OF INSULIN (H): ICD-10-CM

## 2019-12-10 DIAGNOSIS — E11.9 TYPE 2 DIABETES MELLITUS WITHOUT COMPLICATION, WITH LONG-TERM CURRENT USE OF INSULIN (H): ICD-10-CM

## 2019-12-10 DIAGNOSIS — I10 BENIGN ESSENTIAL HYPERTENSION: ICD-10-CM

## 2019-12-10 DIAGNOSIS — Z79.4 TYPE 2 DIABETES MELLITUS WITHOUT COMPLICATION, WITH LONG-TERM CURRENT USE OF INSULIN (H): ICD-10-CM

## 2019-12-10 LAB — HBA1C MFR BLD: 7.1 % (ref 4.1–5.7)

## 2019-12-10 RX ORDER — ASPIRIN 81 MG/1
81 TABLET, CHEWABLE ORAL DAILY
Qty: 100 TABLET | Refills: 2 | Status: SHIPPED | OUTPATIENT
Start: 2019-12-10 | End: 2020-11-17

## 2019-12-10 RX ORDER — LIRAGLUTIDE 6 MG/ML
1.2 INJECTION SUBCUTANEOUS DAILY
Qty: 6 ML | Refills: 2 | Status: SHIPPED | OUTPATIENT
Start: 2019-12-10 | End: 2020-04-06

## 2019-12-10 RX ORDER — LISINOPRIL 40 MG/1
40 TABLET ORAL DAILY
Qty: 90 TABLET | Refills: 1 | Status: SHIPPED | OUTPATIENT
Start: 2019-12-10 | End: 2020-07-14

## 2019-12-10 RX ORDER — METOPROLOL TARTRATE 100 MG
100 TABLET ORAL 2 TIMES DAILY
Qty: 60 TABLET | Refills: 5 | Status: SHIPPED | OUTPATIENT
Start: 2019-12-10 | End: 2020-09-08

## 2019-12-10 ASSESSMENT — MIFFLIN-ST. JEOR: SCORE: 1551.98

## 2019-12-10 NOTE — NURSING NOTE
"59 year old  Chief Complaint   Patient presents with     Diabetes     Pt. presents to the clinic today for a diabetes follow up.        Blood pressure 132/71, pulse 89, temperature 97.4  F (36.3  C), temperature source Oral, resp. rate 16, height 1.722 m (5' 7.8\"), weight 77.6 kg (171 lb), SpO2 97 %. Body mass index is 26.15 kg/m .  BP completed using cuff size:    Patient Active Problem List   Diagnosis     Hypercholesteremia     CARDIOVASCULAR SCREENING; LDL GOAL LESS THAN 100     Hypertension goal BP (blood pressure) < 130/80     Type 2 diabetes, HbA1C goal < 8% (H)     Advanced directives, counseling/discussion     Impingement syndrome, shoulder     Pain in joint, shoulder region     Type 2 diabetes mellitus without complication (H)     Vitamin D deficiency       Wt Readings from Last 2 Encounters:   12/10/19 77.6 kg (171 lb)   09/24/19 80.6 kg (177 lb 9.6 oz)     BP Readings from Last 3 Encounters:   12/10/19 132/71   09/24/19 (!) 157/74   09/10/19 127/69       Allergies   Allergen Reactions     Latex Rash     Latex        Current Outpatient Medications   Medication     amLODIPine (NORVASC) 10 MG tablet     aspirin (ASA) 81 MG chewable tablet     atorvastatin (LIPITOR) 40 MG tablet     blood glucose (NO BRAND SPECIFIED) test strip     blood glucose (NO BRAND SPECIFIED) test strip     blood glucose monitoring (ACCU-CHEK COMPACT CARE KIT) meter device kit     Blood Pressure Monitoring (PREMIUM AUTOMATIC BP MONITOR) CHIARA     glucose (BD GLUCOSE) 5 g chewable tablet     insulin glargine (LANTUS PEN) 100 UNIT/ML pen     insulin pen needle (31G X 8 MM) 31G X 8 MM miscellaneous     liraglutide (VICTOZA) 18 MG/3ML solution     lisinopril (PRINIVIL/ZESTRIL) 40 MG tablet     metFORMIN (GLUCOPHAGE) 500 MG tablet     metoprolol tartrate (LOPRESSOR) 100 MG tablet     nitroGLYcerin (NITROSTAT) 0.4 MG sublingual tablet     ranitidine (ZANTAC) 150 MG tablet     vitamin D3 (CHOLECALCIFEROL) 1000 units (25 mcg) tablet     No " current facility-administered medications for this visit.        Social History     Tobacco Use     Smoking status: Never Smoker     Smokeless tobacco: Former User     Types: Snuff   Substance Use Topics     Alcohol use: No     Drug use: No       Honoring Choices - Health Care Directive Guide offered to patient at time of visit.    Health Maintenance Due   Topic Date Due     HEPATITIS C SCREENING  1960     COLONOSCOPY  01/01/1968     HIV SCREENING  01/01/1973     ZOSTER IMMUNIZATION (1 of 2) 01/01/2008     PREVENTIVE CARE VISIT  03/12/2014     DIABETIC FOOT EXAM  06/12/2015     EYE EXAM  06/20/2015     ADVANCE CARE PLANNING  03/12/2018       Immunization History   Administered Date(s) Administered     DTAP (<7y) 04/02/2003, 05/13/2003     DTaP, Unspecified 12/23/2011     FLU 6-35 months 12/09/2004     Flu, Unspecified 12/09/2004     Influenza (IIV3) PF 12/23/2011     Influenza Vaccine IM > 6 months Valent IIV4 12/04/2018, 11/01/2019     MMR 04/02/2003, 05/13/2003     Pneumococcal 23 valent 12/23/2011     TDAP Vaccine (Adacel) 12/23/2011     Td (Adult), Adsorbed 04/02/2003, 05/13/2003     Tdap (Adult) Unspecified Formulation 12/23/2011       No results found for: PAP      Recent Labs   Lab Test 09/10/19  1355 06/11/19  1325 06/11/19  1300 03/12/19  1207 03/12/19  1157  11/20/18  1426 06/06/14  0945 02/18/14  0826 01/08/14  1521   A1C 8.0* 8.5*  --  7.7*  --    < >  --  8.8* 9.1*  --    LDL  --   --   --   --  90  --   --  174* 110  --    HDL  --   --   --   --  38*  --   --  31* 35*  --    TRIG  --   --   --   --  102  --   --  247* 192*  --    ALT  --   --   --   --   --   --  24 28  --  30   CR  --   --  0.99  --   --   --  0.88 0.83  --  0.88   GFRESTIMATED  --   --  81  --   --   --  88 >90  --  90   GFRESTBLACK  --   --  >90  --   --   --  >90 >90  --  >90   ALBUMIN  --   --   --   --   --   --  4.3 4.1  --  4.1   POTASSIUM  --   --  4.3  --   --   --  4.5 4.4  --  4.3   TSH  --   --  1.35  --   --   --   1.83  --   --  1.83    < > = values in this interval not displayed.       PHQ-2 ( 1999 Pfizer) 6/11/2019 12/4/2018   Q1: Little interest or pleasure in doing things 2 0   Q2: Feeling down, depressed or hopeless 1 0   PHQ-2 Score 3 0       PHQ-9 SCORE 11/20/2018 6/11/2019   PHQ-9 Total Score 4 8       SAURAV-7 SCORE 11/20/2018 6/11/2019   Total Score 6 5       No flowsheet data found.    Vannesa Nolen CMA  December 10, 2019 12:50 PM

## 2019-12-10 NOTE — PROGRESS NOTES
QUINN GARCIA Jumana Jeronimo is a 59 year old  who presents for   Chief Complaint   Patient presents with     Diabetes     Pt. presents to the clinic today for a diabetes follow up.      59 year-old male presents to clinic for diabetes follow up. BS  in the morning. Does feel dizzy and hungry when BS drops to 80. This happens a few times a week. Review of glucometer shows range from 65 to 120. Average is 110. In past 90 days, 5% of reading above and 5% of readings below goal with 89% being within target range.   Today has not taken any medications. Has to go grocery shopping so did not want to take meds without eating.   Needs all medications refilled.     Eye appointment 12-      Problem, Medication and Allergy Lists were   reviewed and updated if needed.     Patient Active Problem List    Diagnosis Date Noted     Vitamin D deficiency 01/08/2019     Priority: Medium     Type 2 diabetes mellitus without complication (H) 10/15/2015     Priority: Medium     Pain in joint, shoulder region 03/11/2014     Priority: Medium     Impingement syndrome, shoulder 10/28/2013     Priority: Medium     Advanced directives, counseling/discussion 03/12/2013     Priority: Medium     Advance Care Planning:   ACP Review and Resources Provided:  Reviewed chart for advance care plan.  Chapo Cowan has no plan or code status on file. Discussed available resources and provided with information. Confirmed code status reflects current choices pending further ACP discussions.  Confirmed/documented designated decision maker(s). See permanent comments section of demographics in clinical tab.   Added by Eduar Shaw on 3/12/2013           Type 2 diabetes, HbA1C goal < 8% (H) 04/26/2012     Priority: Medium     CARDIOVASCULAR SCREENING; LDL GOAL LESS THAN 100 12/23/2011     Priority: Medium     Hypertension goal BP (blood pressure) < 130/80 12/23/2011     Priority: Medium     Hypercholesteremia 12/10/2010     Priority:  Medium         Current Outpatient Medications   Medication Sig Dispense Refill     amLODIPine (NORVASC) 10 MG tablet Take 1 tablet (10 mg) by mouth daily 90 tablet 1     aspirin (ASA) 81 MG chewable tablet Take 1 tablet (81 mg) by mouth daily 100 tablet 2     atorvastatin (LIPITOR) 40 MG tablet Take 1 tablet (40 mg) by mouth daily 90 tablet 1     blood glucose (NO BRAND SPECIFIED) test strip Use to test blood sugar 2 times daily or as directed. 100 each 1     blood glucose (NO BRAND SPECIFIED) test strip Test twice daily. 2 Box 2     blood glucose monitoring (ACCU-CHEK COMPACT CARE KIT) meter device kit Use to test blood sugars 1-2 times daily or as directed. 1 kit 0     blood glucose monitoring (ACCU-CHEK MULTICLIX) lancets Use to test blood sugar 1-2 times daily or as directed. 102 each prn     Blood Pressure Monitoring (PREMIUM AUTOMATIC BP MONITOR) CHIARA 1 Device daily 1 each 0     glucose (BD GLUCOSE) 5 g chewable tablet Take 2 tablets (10 g) by mouth as needed (prn blood sugar < 70) 15 tablet 0     insulin glargine (LANTUS PEN) 100 UNIT/ML pen Inject 24 Units Subcutaneous 2 times daily 15 mL 2     insulin pen needle (31G X 8 MM) 31G X 8 MM miscellaneous Use to inject insulin twice daily and Victoza once daily 100 each 11     liraglutide (VICTOZA) 18 MG/3ML solution Inject 1.2 mg Subcutaneous daily 6 mL 2     lisinopril (PRINIVIL/ZESTRIL) 40 MG tablet Take 1 tablet (40 mg) by mouth daily 90 tablet 1     metFORMIN (GLUCOPHAGE) 500 MG tablet Take 2 tablets (1,000 mg) by mouth 2 times daily (with meals) 120 tablet 2     metoprolol tartrate (LOPRESSOR) 100 MG tablet Take 1 tablet (100 mg) by mouth 2 times daily 60 tablet 5     nitroGLYcerin (NITROSTAT) 0.4 MG sublingual tablet Place 1 tablet (0.4 mg) under the tongue every 5 minutes as needed for chest pain for 3 doses. If symptoms persist 5 minutes after 1st dose call 911. 25 tablet 0     ranitidine (ZANTAC) 150 MG tablet Take 1 tablet (150 mg) by mouth 2 times  daily 60 tablet 5     vitamin D3 (CHOLECALCIFEROL) 1000 units (25 mcg) tablet Take 1 tablet (1,000 Units) by mouth daily 100 tablet 3         Allergies   Allergen Reactions     Latex Rash     Latex    .    Patient is   an established patient of this clinic.  Past Medical History:   Diagnosis Date     Albuminuria      Diabetes mellitus (H)      GERD (gastroesophageal reflux disease)      Hypercholesteremia      Hypertension      Family History   Problem Relation Age of Onset     Diabetes Mother          late 50's     Diabetes Father          54, Heart attack     Eye Disorder Father      Heart Disease Father      Heart Disease Paternal Uncle      Social History     Socioeconomic History     Marital status: Single     Spouse name: None     Number of children: None     Years of education: None     Highest education level: None   Occupational History     Occupation: unemployed   Social Needs     Financial resource strain: None     Food insecurity:     Worry: None     Inability: None     Transportation needs:     Medical: None     Non-medical: None   Tobacco Use     Smoking status: Never Smoker     Smokeless tobacco: Former User     Types: Snuff   Substance and Sexual Activity     Alcohol use: No     Drug use: No     Sexual activity: Not Currently   Lifestyle     Physical activity:     Days per week: 5 days     Minutes per session: 20 min     Stress: None   Relationships     Social connections:     Talks on phone: None     Gets together: None     Attends Hinduism service: None     Active member of club or organization: None     Attends meetings of clubs or organizations: None     Relationship status: None     Intimate partner violence:     Fear of current or ex partner: None     Emotionally abused: None     Physically abused: None     Forced sexual activity: None   Other Topics Concern     Parent/sibling w/ CABG, MI or angioplasty before 65F 55M? No   Social History Narrative    Currently unemployed.  "Lives in neighborhood        Walks most days.    Cooks own meals.     Currently does not have phone. Working on replacing. 12/2019: Obtained new phone.    .         Review of Systems:   Review of Systems  GEN: working on weight loss. Not as active as usual due to weather, but cleans and moves around within home.  Needs to grocery shop currently as low on food.   HEENT: Has eye appointment on 12/13. denies cold symptoms  RESP: negative for cough, SOB  CV: negative for chest pain, irregular heart beat  ENDO: see above for BS review. Trying to increase vegetables in diet  NEURO: occasional numbness in toes and sometimes cramping in feet also       Physical Exam:     Vitals:    12/10/19 1245   BP: 132/71   BP Location: Left arm   Patient Position: Chair   Cuff Size: Adult Regular   Pulse: 89   Resp: 16   Temp: 97.4  F (36.3  C)   TempSrc: Oral   SpO2: 97%   Weight: 77.6 kg (171 lb)   Height: 1.722 m (5' 7.8\")     Body mass index is 26.15 kg/m .  Vitals were reviewed and were normal     Physical Exam  GEN: alert male, talkative, in no acute distress  RESP: lungs clear to auscultation  CV: HRRR, S1, S2, no MRG  FOOT EXAM: 1/2 cm scabbed laceration right dorsal foot proximal to right great toe. Thickened deformed right great toe nail. Calloused area 5th toenail left foot. CMS intact to monofilament. Pedal pulses +2 bilaterally. Feet warm, cap refill < 2 seconds.     Results:      Results from this visit  Results for orders placed or performed in visit on 12/10/19   Hemoglobin A1c ([facility])     Status: Abnormal   Result Value Ref Range    Hemoglobin A1C 7.1 (H) 4.1 - 5.7 %       Assessment and Plan        1. Benign essential hypertension  Refilled, reviewed use  - aspirin (ASA) 81 MG chewable tablet; Take 1 tablet (81 mg) by mouth daily  Dispense: 100 tablet; Refill: 2  - metoprolol tartrate (LOPRESSOR) 100 MG tablet; Take 1 tablet (100 mg) by mouth 2 times daily  Dispense: 60 tablet; Refill: 5    2. Type 2 " diabetes mellitus without complication, with long-term current use of insulin (H)  Refilled, reviewed use.   - blood glucose (NO BRAND SPECIFIED) test strip; Use to test blood sugar 2 times daily or as directed.  Dispense: 100 each; Refill: 1  - insulin pen needle (31G X 8 MM) 31G X 8 MM miscellaneous; Use to inject insulin twice daily and Victoza once daily  Dispense: 100 each; Refill: 11  - liraglutide (VICTOZA) 18 MG/3ML solution; Inject 1.2 mg Subcutaneous daily  Dispense: 6 mL; Refill: 2  - metFORMIN (GLUCOPHAGE) 500 MG tablet; Take 2 tablets (1,000 mg) by mouth 2 times daily (with meals)  Dispense: 120 tablet; Refill: 2    3. Type 2 diabetes mellitus with hypoglycemia without coma, with long-term current use of insulin (H)  Refilled, to check BS 1-2 times daily. Goal is . Reinforced weight loss and attention to diet. A1c 7.1 improved from previous.  - glucose (BD GLUCOSE) 5 g chewable tablet; Take 2 tablets (10 g) by mouth as needed (prn blood sugar < 70)  Dispense: 15 tablet; Refill: 0  - Hemoglobin A1c ([facility])  - blood glucose monitoring (ACCU-CHEK MULTICLIX) lancets; Use to test blood sugar 1-2 times daily or as directed.  Dispense: 102 each; Refill: prn    4. Type 2 diabetes mellitus with diabetic nephropathy, with long-term current use of insulin (H)  Refilled, reviewed use.  - insulin glargine (LANTUS PEN) 100 UNIT/ML pen; Inject 24 Units Subcutaneous 2 times daily  Dispense: 15 mL; Refill: 2    5. Hypertension goal BP (blood pressure) < 130/80  Refilled, reviewed use  - lisinopril (PRINIVIL/ZESTRIL) 40 MG tablet; Take 1 tablet (40 mg) by mouth daily  Dispense: 90 tablet; Refill: 1    6. Gastroesophageal reflux disease with esophagitis  Will try to fill this. To have pharmacy notify us if unable to fill given current recall.   - ranitidine (ZANTAC) 150 MG tablet; Take 1 tablet (150 mg) by mouth 2 times daily  Dispense: 60 tablet; Refill: 5    7. Special screening for malignant neoplasms,  colon  Annual FITT due.   - Fecal colorectal cancer screen FITT; Future    Follow up in 3 months.  Labs due early summer. A1c in 3 months.      There are no discontinued medications.     Received flu vaccine about 1 month ago per patient history.     Options for treatment and follow-up care were reviewed with the patient. Phani Jeronimo  engaged in the decision making process and verbalized understanding of the options discussed and agreed with the final plan.    RICARDO De Luna CNP

## 2019-12-10 NOTE — PATIENT INSTRUCTIONS
Diabetes type 2  Continue on insulin at bedtime and Vicotoza and Metformin as previously prescribed.   A1c today  Will plan other lab tests in March  Keep up the good work on diet and weight loss  Try to build in some exercise daily  Reordered medications, lancets, needles, and test strips.    Colon cancer screening  FIT due for repeat. Last done last year.

## 2020-01-08 DIAGNOSIS — Z79.4 TYPE 2 DIABETES MELLITUS WITH HYPOGLYCEMIA WITHOUT COMA, WITH LONG-TERM CURRENT USE OF INSULIN (H): Primary | ICD-10-CM

## 2020-01-08 DIAGNOSIS — E11.649 TYPE 2 DIABETES MELLITUS WITH HYPOGLYCEMIA WITHOUT COMA, WITH LONG-TERM CURRENT USE OF INSULIN (H): Primary | ICD-10-CM

## 2020-01-09 NOTE — TELEPHONE ENCOUNTER
glucose (BD GLUCOSE) 4 g chewable tablet     Last Written Prescription Date:  12/10/2019  Last Fill Quantity: 15,   # refills: 0  Last Office Visit : 12/10/2019  Future Office visit:  3/10/2020    Routing refill request to provider for review/approval because:  Drug not on the [facility], [facility] or [facility] refill protocol or controlled substance      Claire Campos RN  Central Triage Red Flags/Med Refills

## 2020-01-10 DIAGNOSIS — Z12.11 SPECIAL SCREENING FOR MALIGNANT NEOPLASMS, COLON: ICD-10-CM

## 2020-01-10 PROCEDURE — 82274 ASSAY TEST FOR BLOOD FECAL: CPT | Performed by: NURSE PRACTITIONER

## 2020-01-14 LAB — HEMOCCULT STL QL IA: NEGATIVE

## 2020-02-06 ENCOUNTER — TELEPHONE (OUTPATIENT)
Dept: FAMILY MEDICINE | Facility: CLINIC | Age: 60
End: 2020-02-06

## 2020-02-06 NOTE — TELEPHONE ENCOUNTER
Patient called indicating he needs new RX for insulin needles. Insulin needles were ordered 12/10/2019 with 11 refills. Called pharmacy to clarify.  Informed patient has refills, just tried to  too early. Last picked up 1/10/2019 and needs to wait 1 month before picks up again. Called patient back and notified him of this. Leah SILVA CNP

## 2020-03-05 DIAGNOSIS — E11.21 TYPE 2 DIABETES MELLITUS WITH DIABETIC NEPHROPATHY, WITH LONG-TERM CURRENT USE OF INSULIN (H): ICD-10-CM

## 2020-03-05 DIAGNOSIS — Z79.4 TYPE 2 DIABETES MELLITUS WITH DIABETIC NEPHROPATHY, WITH LONG-TERM CURRENT USE OF INSULIN (H): ICD-10-CM

## 2020-03-05 NOTE — PROGRESS NOTES
"I spoke with Phani, he is requesting a refill on his Lantus.  He states he takes 25 U twice daily.  I refilled for 1500 U per month, plus 2 refills.    Phani also felt that he was not respected on the phone call at our office, from the \"reception.\"  I told him I would follow up with the  staff and let them know his concerns.  "

## 2020-04-06 DIAGNOSIS — E11.9 TYPE 2 DIABETES MELLITUS WITHOUT COMPLICATION, WITH LONG-TERM CURRENT USE OF INSULIN (H): ICD-10-CM

## 2020-04-06 DIAGNOSIS — Z79.4 TYPE 2 DIABETES MELLITUS WITHOUT COMPLICATION, WITH LONG-TERM CURRENT USE OF INSULIN (H): ICD-10-CM

## 2020-04-06 DIAGNOSIS — E78.5 HYPERLIPIDEMIA LDL GOAL <100: ICD-10-CM

## 2020-04-06 RX ORDER — ATORVASTATIN CALCIUM 40 MG/1
40 TABLET, FILM COATED ORAL DAILY
Qty: 90 TABLET | Refills: 1 | Status: SHIPPED | OUTPATIENT
Start: 2020-04-06 | End: 2020-09-08

## 2020-04-06 RX ORDER — LIRAGLUTIDE 6 MG/ML
1.2 INJECTION SUBCUTANEOUS DAILY
Qty: 6 ML | Refills: 2 | Status: SHIPPED | OUTPATIENT
Start: 2020-04-06 | End: 2020-07-14

## 2020-04-07 ENCOUNTER — VIRTUAL VISIT (OUTPATIENT)
Dept: FAMILY MEDICINE | Facility: CLINIC | Age: 60
End: 2020-04-07
Payer: COMMERCIAL

## 2020-04-07 DIAGNOSIS — K21.9 GASTROESOPHAGEAL REFLUX DISEASE WITHOUT ESOPHAGITIS: Primary | ICD-10-CM

## 2020-04-07 DIAGNOSIS — Z79.4 TYPE 2 DIABETES MELLITUS WITHOUT COMPLICATION, WITH LONG-TERM CURRENT USE OF INSULIN (H): ICD-10-CM

## 2020-04-07 DIAGNOSIS — E11.9 TYPE 2 DIABETES MELLITUS WITHOUT COMPLICATION, WITH LONG-TERM CURRENT USE OF INSULIN (H): ICD-10-CM

## 2020-04-07 RX ORDER — FAMOTIDINE 20 MG/1
20 TABLET, FILM COATED ORAL 2 TIMES DAILY
Qty: 60 TABLET | Refills: 1 | Status: SHIPPED | OUTPATIENT
Start: 2020-04-07 | End: 2020-07-14

## 2020-04-07 NOTE — PROGRESS NOTES
Attempted to call patient x3. Message left to call clinic when he is ready. There are 2 numbers in chart: one goes to answering machine, the other says the line is busy. Eventually reached 2nd number, this is not patient's phone. Use [phone]. Meds were reordered yesterday, Leah Sherwinkittyswati GIRON encounter:  Patient reached at 1:18pm and concluded at 1:29 pm  He consents to a telephone visit.  CC: 60 year-old male with history of diabetes for follow up DM  HPI has had diabetes for several years and has been fairly well controlled on metformin, liraglutide and Insulin glargine. He is taking all medications as ordered. He indicates he requested a refill of liraglutide, glucose test strips and atorvastatin yesterday. These were refilled and sent to pharmacy yesterday. BS have been running 80s to 120s. He reports attending to diet, walking daily. He feels well overall. Denies vision changes, last eye exam was 3 months ago, denies chestpain, irregular heart beat, numbness and tingling in toes and fingers.  He has not had any episodes of hypoglycemia.     Only concern today is ranitidine was stopped due to withdrawal from market.  He has occasional reflux symptoms and heartburn. Indicates this feels stomach related and not heart related.  He would like to try something else for this.      Denies illness symptoms    Past Medical History:   Diagnosis Date     Albuminuria      Diabetes mellitus (H)      GERD (gastroesophageal reflux disease)      Hypercholesteremia      Hypertension        Past Surgical History:   Procedure Laterality Date     cataract repair Bilateral        Family History   Problem Relation Age of Onset     Diabetes Mother          late 50's     Diabetes Father          54, Heart attack     Eye Disorder Father      Heart Disease Father      Heart Disease Paternal Uncle        Social History     Tobacco Use     Smoking status: Never Smoker     Smokeless tobacco: Former User      Types: Snuff   Substance Use Topics     Alcohol use: No     Current Outpatient Medications   Medication     amLODIPine (NORVASC) 10 MG tablet     aspirin (ASA) 81 MG chewable tablet     atorvastatin (LIPITOR) 40 MG tablet     blood glucose (NO BRAND SPECIFIED) test strip     blood glucose (NO BRAND SPECIFIED) test strip     blood glucose monitoring (ACCU-CHEK COMPACT CARE KIT) meter device kit     blood glucose monitoring (ACCU-CHEK MULTICLIX) lancets     Blood Pressure Monitoring (PREMIUM AUTOMATIC BP MONITOR) CHIARA     famotidine (PEPCID) 20 MG tablet     glucose (BD GLUCOSE) 4 g chewable tablet     glucose (BD GLUCOSE) 5 g chewable tablet     insulin glargine (LANTUS PEN) 100 UNIT/ML pen     insulin pen needle (31G X 8 MM) 31G X 8 MM miscellaneous     liraglutide (VICTOZA) 18 MG/3ML solution     lisinopril (PRINIVIL/ZESTRIL) 40 MG tablet     metFORMIN (GLUCOPHAGE) 500 MG tablet     metoprolol tartrate (LOPRESSOR) 100 MG tablet     nitroGLYcerin (NITROSTAT) 0.4 MG sublingual tablet     vitamin D3 (CHOLECALCIFEROL) 1000 units (25 mcg) tablet     No current facility-administered medications for this visit.      Objective  Talkative on phone, no SOB. Speech is clear, easily understood.   Telphone visit: rest of PE deferred.    1. Gastroesophageal reflux disease without esophagitis  Trial:  - famotidine (PEPCID) 20 MG tablet; Take 1 tablet (20 mg) by mouth 2 times daily  Dispense: 60 tablet; Refill: 1  Avoid spicy, acidic foods  Seek care if worsening symptoms    2. Type 2 diabetes mellitus without complication, with long-term current use of insulin (H)  Refilled glucose test strips and liraglutide yesterday. TO notify us if he needs other medications filled.    Reviewed signs and symptoms of hypoglycemia. Reinforced healthy diet and exercise  Discussed upcoming Ramadan; recalled last year his blood sugar worsened considerably during Ramadan, because he fasted all day and then ate large meals. Discussed that because of  diabetes, he is allowed to eat something during the day. He indicates he prefers to fast and plans to not each such large foods. Cautioned medications may need to be reduced. Will request pharmacist, Lenora Mo call him to discuss. He is open to this plan.  Follow up in 3 months, with on site visit and labs at that time; sooner if any concerns. Leah SILVA CNP

## 2020-04-07 NOTE — PATIENT INSTRUCTIONS
"1. Diabetes Type 2  I received refill request for these medications yesterday: Atorvastatin (for cholesterol), liraglutide (Victoza) for diabetes and glucose test strips. These were refilled and sent to your pharmacy yesterday.    Continue your current medications  Because of Ramadan, I am going to ask the pharmacist, Lenora to contact you to discuss approaches and potential medication adjustments during Ramadan. Last year, your blood sugar levels got out of control due to the large meals at the end of the fast. Because you have diabetes, you are allowed to break the fast, but I understand you do not want to do that.   Be sure to check your blood sugar more often during Ramadan, especially if you feel lightheaded, dizzy, shaky, sweaty or \"foggy\". These could be symptoms of low blood sugar.    Here are some practical tips:  Changes to your diet  During Ramadan your eating pattern will be different.  There are only 2 meals per day, Sehri (early morning) and Iftar (evening).  Because of this you may experience large swings in your blood glucose levels because of the long gap between meals and larger amounts of food.  To help control blood glucose levels follow these dietary guidelines during Ramadan and see the tables below:    Try to have the meal at Sehri just before sunrise not at midnight. This will spread out your energy intake more evenly and result in more balanced blood glucose when fasting.    Include fruits, vegetables, dhal and yogurt in your meals at Iftar and Sehri.    Limit fried foods eg paratha, ana, chevera, katlamas, fried kebabs and Bombay mix.    Choose sugar-free types of fizzy drinks, decaffeinated drinks and cordials or water and use these to quench your thirst. Avoid adding sugar to hot drinks; use a sweetener where needed eg Canderel, Sweetex, Hermesetas.    Fill up on starchy foods such as basmati rice, chapatti and pitta bread before you begin the fast.  Foods to avoid Alternative foods "   Deep fried pakoras, samosas, fried dumplings Chickpeas, baked samosas, boiled dumplings   Mongolian sweets eg Ghulab, Rasgulla, Burfi, Balushahi, Baklawa, Mithai Milk based sweets and puddings, eg Rasmalai, Barfee   High fat cooked foods, eg oily curries and pastries Alternate with chapattis made without oil and baked or grilled meat and chicken.  Make pastry at home and use a single layer.   Cooking methods to avoid Alternative cooking methods   Deep frying Shallow frying, grilling, baking or dry frying.   Curries with excessive oil Reduce the amount of oil used (eg use 1-2 tablespoons for a 4 person dish) and use more onions and tomatoes to bulk out the lay.     2. Stomach acid and heartburn  Start famotidine 20mg twice daily for heartburn. This will replace the ranitidine that is no longer available. Once your symptoms are under control, we might be able to decrease the amount and use it onkly when needed. \  If your stomach pain worsens, please let us know.     We will see you again after COVID in about 3 months, but sooner if you have any concerns.     Thank you!

## 2020-04-07 NOTE — TELEPHONE ENCOUNTER
Refill requested medications: Atorvastatin, Liraglutide and glucose test strips. WIll try to see him after COVID-19 precautions and do BMP, LIPIDS, A1C, random alb:creatine ratio. Has appointment scheduled for tomorrow also. Plan to convert to phone of video visit. Leah SILVA CNP

## 2020-04-07 NOTE — NURSING NOTE
60 year old  Chief Complaint   Patient presents with     Diabetes     diabetes follow up.        There were no vitals taken for this visit. There is no height or weight on file to calculate BMI.  BP completed using cuff size:    Patient Active Problem List   Diagnosis     Hypercholesteremia     CARDIOVASCULAR SCREENING; LDL GOAL LESS THAN 100     Hypertension goal BP (blood pressure) < 130/80     Type 2 diabetes, HbA1C goal < 8% (H)     Advanced directives, counseling/discussion     Impingement syndrome, shoulder     Pain in joint, shoulder region     Type 2 diabetes mellitus without complication (H)     Vitamin D deficiency       Wt Readings from Last 2 Encounters:   12/10/19 77.6 kg (171 lb)   09/24/19 80.6 kg (177 lb 9.6 oz)     BP Readings from Last 3 Encounters:   12/10/19 132/71   09/24/19 (!) 157/74   09/10/19 127/69       Allergies   Allergen Reactions     Latex Rash     Latex        Current Outpatient Medications   Medication     amLODIPine (NORVASC) 10 MG tablet     aspirin (ASA) 81 MG chewable tablet     atorvastatin (LIPITOR) 40 MG tablet     blood glucose (NO BRAND SPECIFIED) test strip     blood glucose (NO BRAND SPECIFIED) test strip     blood glucose monitoring (ACCU-CHEK COMPACT CARE KIT) meter device kit     blood glucose monitoring (ACCU-CHEK MULTICLIX) lancets     Blood Pressure Monitoring (PREMIUM AUTOMATIC BP MONITOR) CHIARA     glucose (BD GLUCOSE) 4 g chewable tablet     glucose (BD GLUCOSE) 5 g chewable tablet     insulin glargine (LANTUS PEN) 100 UNIT/ML pen     insulin pen needle (31G X 8 MM) 31G X 8 MM miscellaneous     liraglutide (VICTOZA) 18 MG/3ML solution     lisinopril (PRINIVIL/ZESTRIL) 40 MG tablet     metFORMIN (GLUCOPHAGE) 500 MG tablet     metoprolol tartrate (LOPRESSOR) 100 MG tablet     nitroGLYcerin (NITROSTAT) 0.4 MG sublingual tablet     ranitidine (ZANTAC) 150 MG tablet     vitamin D3 (CHOLECALCIFEROL) 1000 units (25 mcg) tablet     No current facility-administered  medications for this visit.        Social History     Tobacco Use     Smoking status: Never Smoker     Smokeless tobacco: Former User     Types: Snuff   Substance Use Topics     Alcohol use: No     Drug use: No       Honoring Choices - Health Care Directive Guide offered to patient at time of visit.    Health Maintenance Due   Topic Date Due     HEPATITIS C SCREENING  1960     HIV SCREENING  01/01/1973     ZOSTER IMMUNIZATION (1 of 2) 01/01/2008     PREVENTIVE CARE VISIT  03/12/2014     DIABETIC FOOT EXAM  06/12/2015     EYE EXAM  06/20/2015     ADVANCE CARE PLANNING  03/12/2018     PHQ-2  01/01/2020     LIPID  03/12/2020       Immunization History   Administered Date(s) Administered     DTAP (<7y) 04/02/2003, 05/13/2003     DTaP, Unspecified 12/23/2011     FLU 6-35 months 12/09/2004     Flu, Unspecified 12/09/2004     Influenza (IIV3) PF 12/23/2011     Influenza Vaccine IM > 6 months Valent IIV4 12/04/2018, 11/01/2019     MMR 04/02/2003, 05/13/2003     Pneumococcal 23 valent 12/23/2011     TDAP Vaccine (Adacel) 12/23/2011     Td (Adult), Adsorbed 04/02/2003, 05/13/2003     Tdap (Adult) Unspecified Formulation 12/23/2011       No results found for: PAP      Recent Labs   Lab Test 12/10/19  1327 09/10/19  1355 06/11/19  1325 06/11/19  1300  03/12/19  1157  11/20/18  1426 06/06/14  0945 02/18/14  0826 01/08/14  1521   A1C 7.1* 8.0* 8.5*  --    < >  --    < >  --  8.8* 9.1*  --    LDL  --   --   --   --   --  90  --   --  174* 110  --    HDL  --   --   --   --   --  38*  --   --  31* 35*  --    TRIG  --   --   --   --   --  102  --   --  247* 192*  --    ALT  --   --   --   --   --   --   --  24 28  --  30   CR  --   --   --  0.99  --   --   --  0.88 0.83  --  0.88   GFRESTIMATED  --   --   --  81  --   --   --  88 >90  --  90   GFRESTBLACK  --   --   --  >90  --   --   --  >90 >90  --  >90   ALBUMIN  --   --   --   --   --   --   --  4.3 4.1  --  4.1   POTASSIUM  --   --   --  4.3  --   --   --  4.5 4.4  --  4.3    TSH  --   --   --  1.35  --   --   --  1.83  --   --  1.83    < > = values in this interval not displayed.       PHQ-2 ( 1999 [employer]) 6/11/2019 12/4/2018   Q1: Little interest or pleasure in doing things 2 0   Q2: Feeling down, depressed or hopeless 1 0   PHQ-2 Score 3 0       PHQ-9 SCORE 11/20/2018 6/11/2019   PHQ-9 Total Score 4 8       SAURAV-7 SCORE 11/20/2018 6/11/2019   Total Score 6 5       No flowsheet data found.    Vannesa Nolen CMA  April 7, 2020 9:01 AM

## 2020-04-16 ENCOUNTER — VIRTUAL VISIT (OUTPATIENT)
Dept: PHARMACY | Facility: CLINIC | Age: 60
End: 2020-04-16
Payer: COMMERCIAL

## 2020-04-16 ENCOUNTER — TELEPHONE (OUTPATIENT)
Dept: PHARMACY | Facility: CLINIC | Age: 60
End: 2020-04-16

## 2020-04-16 DIAGNOSIS — Z79.4 TYPE 2 DIABETES MELLITUS WITHOUT COMPLICATION, WITH LONG-TERM CURRENT USE OF INSULIN (H): Primary | ICD-10-CM

## 2020-04-16 DIAGNOSIS — E11.9 TYPE 2 DIABETES MELLITUS WITHOUT COMPLICATION, WITH LONG-TERM CURRENT USE OF INSULIN (H): Primary | ICD-10-CM

## 2020-04-16 NOTE — TELEPHONE ENCOUNTER
"Talked to patient to discuss plans for upcoming Ramadan. Patient notes that he plans to fast \"unless he gets tired\" and then might break the fast.    He notes that currently, he is taking his fasting blood sugar daily and it has been 70s-90s.  He notes that he has experienced some diarrhea in the past few days and wonders if this has brought his blood sugar down slightly. He is unsure if the diarrhea is due to some potentially old milk that he drank or from his new medication, famotidine.  He is now holding famotidine and will plan to resume famotidine when diarrhea is resolved.  I endorsed this plan and encouraged Phani to let us know if diarrhea does not resolve soon.    Phani notes that last year during Ramadan, he did have some high blood sugars.  He attributes this to eating too many dates.  He does not normally eat dates, but eats them more during ramadan due to Judaism reasons. This year, he plans to only eat one date/day.  Additionally, last year he also experienced some low blood sugar. Discussed correction of hypoglycemia.  Phani is able to appropriately describe how he corrects hypoglycemia and is willing to break his fast if he experiences hypoglycemia.      During Ramadan last year, he thinks he took less insulin, but can not recall.  He did not plan to reduce his insulin this year, but after further discussion, he proposes taking 24 units with pre-carson meal and taking 12 units with after sunset meal.  I endorsed this plan. Additionally, suggested that Phani takes 1500 mg metformin in the morning and 500 mg in the evening during Ramadan.    He notes that for his pre-carson meal, he nromally eats jonathon beans, fish, meat, vegetables.  After the sun sets, he eats the same sort of food.  Recommended that patient eats more complex carbs in pre-sunrise meal and simpler carbohydrates in his meal after the sunsets and discussed how that could be accomplished.    Summary of plan:  - Hold famotidine until diarrhea " resolves and then trial resuming famotidine  - Duing Ramadan, Phani will take 24 units insulin glargine in the morning and 12 units in the evening.  He will take 1500 mg metformin in the morning and 500 mg in the evening.  - Reviewed correction of hypoglycemia  - Reviewed recommendations for dietary approaches during Ramadan.  -Will plan to call patient during the first week of Ramadan to check in on blood glucose. Recommended patient reach out with questions if needed prior to then.    Lenora Mo, ChinaD

## 2020-04-28 ENCOUNTER — TELEPHONE (OUTPATIENT)
Dept: PHARMACY | Facility: CLINIC | Age: 60
End: 2020-04-28

## 2020-04-28 NOTE — TELEPHONE ENCOUNTER
Attempted to call patient back to discuss glycemic control now that Ramadan has started.    Phone call was very poor connection.  It sounded as though patient was doing well and taking a reduced dose of medication.  Otherwise, I was unable to hear him well. We agreed that I would try to call back to see if reception would be better, but was unable to successfully get through to patient after multiple tries.  Will try again tomorrow.    Lenora Mo, ChinaD

## 2020-04-30 ENCOUNTER — VIRTUAL VISIT (OUTPATIENT)
Dept: PHARMACY | Facility: CLINIC | Age: 60
End: 2020-04-30
Payer: COMMERCIAL

## 2020-04-30 DIAGNOSIS — Z79.4 TYPE 2 DIABETES MELLITUS WITHOUT COMPLICATION, WITH LONG-TERM CURRENT USE OF INSULIN (H): Primary | ICD-10-CM

## 2020-04-30 DIAGNOSIS — E11.9 TYPE 2 DIABETES MELLITUS WITHOUT COMPLICATION, WITH LONG-TERM CURRENT USE OF INSULIN (H): Primary | ICD-10-CM

## 2020-05-04 NOTE — PROGRESS NOTES
"SUBJECTIVE: Phani is a 60 year old male who was referred by Leah Ford DNP for [facility] services for diabetes management.      Diabetes: Talked to patient to discuss plans for upcoming Ramadan. Patient notes that he plans to fast \"unless he gets tired\" and then might break the fast.     He notes that currently, he is taking his fasting blood sugar daily and it has been 70s-90s.  He notes that he has experienced some diarrhea in the past few days and wonders if this has brought his blood sugar down slightly. He is unsure if the diarrhea is due to some potentially old milk that he drank or from his new medication, famotidine.  He is now holding famotidine and will plan to resume famotidine when diarrhea is resolved.  I endorsed this plan and encouraged Phani to let us know if diarrhea does not resolve soon.     Phani notes that last year during Ramadan, he did have some high blood sugars.  He attributes this to eating too many dates.  He does not normally eat dates, but eats them more during ramadan due to Hinduism reasons. This year, he plans to only eat one date/day.  Additionally, last year he also experienced some low blood sugar. Discussed correction of hypoglycemia.  Phani is able to appropriately describe how he corrects hypoglycemia and is willing to break his fast if he experiences hypoglycemia.       During Ramadan last year, he thinks he took less insulin, but can not recall.  He did not plan to reduce his insulin this year, but after further discussion, he proposes taking 24 units with pre-carson meal and taking 12 units with after sunset meal.  I endorsed this plan. Additionally, suggested that Phani takes 1500 mg metformin in the morning and 500 mg in the evening during Ramadan.     He notes that for his pre-carson meal, he nromally eats jonathon beans, fish, meat, vegetables.  After the sun sets, he eats the same sort of food.  Recommended that patient eats more complex carbs in pre-sunrise meal and simpler " "carbohydrates in his meal after the sunsets and discussed how that could be accomplished.    Environmental factors that impact patient: Observing Ramadan which will likely impact glycemic control.      Patient Active Problem List   Diagnosis     Hypercholesteremia     CARDIOVASCULAR SCREENING; LDL GOAL LESS THAN 100     Hypertension goal BP (blood pressure) < 130/80     Type 2 diabetes, HbA1C goal < 8% (H)     Advanced directives, counseling/discussion     Impingement syndrome, shoulder     Pain in joint, shoulder region     Type 2 diabetes mellitus without complication (H)     Vitamin D deficiency        OBJECTIVE:     SAURAV-7 SCORE 11/20/2018 6/11/2019   Total Score 6 5       PHQ-9 SCORE 11/20/2018 6/11/2019   PHQ-9 Total Score 4 8         VITALS:  BP Readings from Last 3 Encounters:   12/10/19 132/71   09/24/19 (!) 157/74   09/10/19 127/69           Weight:   Wt Readings from Last 1 Encounters:   12/10/19 171 lb (77.6 kg)       Height:   Ht Readings from Last 1 Encounters:   12/10/19 5' 7.8\" (172.2 cm)       LABORATORY VALUES:   Last A1C:    Lab Results   Component Value Date    A1C 7.1 12/10/2019   .    Last Basic Metabolic Panel:  Lab Results   Component Value Date     06/11/2019      Lab Results   Component Value Date    POTASSIUM 4.3 06/11/2019     Lab Results   Component Value Date    CHLORIDE 109 06/11/2019     Lab Results   Component Value Date    LIU 9.6 06/11/2019     Lab Results   Component Value Date    CO2 22 06/11/2019     Lab Results   Component Value Date    BUN 20 06/11/2019     Lab Results   Component Value Date    CR 0.99 06/11/2019     Lab Results   Component Value Date     06/11/2019       Lipid Panel Labs  Lab Results   Component Value Date    CHOL 148 03/12/2019    CHOL 255 06/06/2014     Lab Results   Component Value Date    TRIG 102 03/12/2019    TRIG 247 06/06/2014     Lab Results   Component Value Date    HDL 38 03/12/2019    HDL 31 06/06/2014     Lab Results   Component Value " Date    LDL 90 03/12/2019     06/06/2014       Hepatic Panel Labs  Lab Results   Component Value Date    AST 13 11/20/2018     Lab Results   Component Value Date    ALT 24 11/20/2018         SOCIAL AND FAMILY HISTORY  Social History     Tobacco Use     Smoking status: Never Smoker     Smokeless tobacco: Former User     Types: Snuff   Substance Use Topics     Alcohol use: No    .  Most Recent Immunizations   Administered Date(s) Administered     DTAP (<7y) 05/13/2003     DTaP, Unspecified 12/23/2011     FLU 6-35 months 12/09/2004     Flu, Unspecified 12/09/2004     Influenza (IIV3) PF 12/23/2011     Influenza Vaccine IM > 6 months Valent IIV4 11/01/2019     MMR 05/13/2003     Pneumococcal 23 valent 12/23/2011     TDAP Vaccine (Adacel) 12/23/2011     Td (Adult), Adsorbed 05/13/2003     Tdap (Adult) Unspecified Formulation 12/23/2011       CURRENT MEDICATIONS:   Current Outpatient Medications   Medication Sig Dispense Refill     amLODIPine (NORVASC) 10 MG tablet Take 1 tablet (10 mg) by mouth daily 90 tablet 1     aspirin (ASA) 81 MG chewable tablet Take 1 tablet (81 mg) by mouth daily 100 tablet 2     atorvastatin (LIPITOR) 40 MG tablet Take 1 tablet (40 mg) by mouth daily 90 tablet 1     blood glucose (NO BRAND SPECIFIED) test strip Use to test blood sugar 2 times daily or as directed. 100 each 1     blood glucose (NO BRAND SPECIFIED) test strip Test twice daily. 2 Box 2     blood glucose monitoring (ACCU-CHEK COMPACT CARE KIT) meter device kit Use to test blood sugars 1-2 times daily or as directed. 1 kit 0     blood glucose monitoring (ACCU-CHEK MULTICLIX) lancets Use to test blood sugar 1-2 times daily or as directed. 102 each prn     Blood Pressure Monitoring (PREMIUM AUTOMATIC BP MONITOR) CIHARA 1 Device daily 1 each 0     famotidine (PEPCID) 20 MG tablet Take 1 tablet (20 mg) by mouth 2 times daily 60 tablet 1     glucose (BD GLUCOSE) 4 g chewable tablet Take 2 tablets by mouth as needed (for blood sugar  less than 70.) 20 tablet 6     glucose (BD GLUCOSE) 5 g chewable tablet Take 2 tablets (10 g) by mouth as needed (prn blood sugar < 70) 15 tablet 0     insulin glargine (LANTUS PEN) 100 UNIT/ML pen Inject 25 Units Subcutaneous 2 times daily 15 mL 2     insulin pen needle (31G X 8 MM) 31G X 8 MM miscellaneous Use to inject insulin twice daily and Victoza once daily 100 each 11     liraglutide (VICTOZA) 18 MG/3ML solution Inject 1.2 mg Subcutaneous daily 6 mL 2     lisinopril (PRINIVIL/ZESTRIL) 40 MG tablet Take 1 tablet (40 mg) by mouth daily 90 tablet 1     metFORMIN (GLUCOPHAGE) 500 MG tablet Take 2 tablets (1,000 mg) by mouth 2 times daily (with meals) 120 tablet 2     metoprolol tartrate (LOPRESSOR) 100 MG tablet Take 1 tablet (100 mg) by mouth 2 times daily 60 tablet 5     nitroGLYcerin (NITROSTAT) 0.4 MG sublingual tablet Place 1 tablet (0.4 mg) under the tongue every 5 minutes as needed for chest pain for 3 doses. If symptoms persist 5 minutes after 1st dose call 911. 25 tablet 0     vitamin D3 (CHOLECALCIFEROL) 1000 units (25 mcg) tablet Take 1 tablet (1,000 Units) by mouth daily 100 tablet 3       ALLERGIES:     Allergies   Allergen Reactions     Latex Rash     Latex           ASSESSMENT:    Diabetes: At goal based on reported fasting SMBG.  Likely the most important thing clinically during Ramadan will to ensure that Phani is not experiencing dangerous episodes of hypoglycemia.  This can be achieved through a reduction in insulin dose along with close dietary assessment and teaching around correction for hypoglycemia. MTP: Dose too high (insulin)    Diarrhea: New onset and so potentially tied to newer medication. MTP: Potential adverse effect (famotidine)    All medications were reviewed and found to be indicated, effective, safe and convenient unless drug therapy problem identified as described above.    PLAN:     - Hold famotidine until diarrhea resolves and then trial resuming famotidine  - Duing Ramadan,  Phani will take 24 units insulin glargine in the morning and 12 units in the evening.  He will take 1500 mg metformin in the morning and 500 mg in the evening.  - Reviewed correction of hypoglycemia  - Reviewed recommendations for dietary approaches during Ramadan.  -Will plan to call patient during the first week of Ramadan to check in on blood glucose. Recommended patient reach out with questions if needed prior to then.    Options for treatment and/or follow-up care were reviewed with the patient. Phani Jeronimo was engaged and actively involved in the decision making process. He/She verbalized understanding of the options discussed and was satisfied with the final plan.      Medical conditions reviewed: 2    Medications reviewed: 3    MTP identified: 2    Time spent: 20 minutes    Level of service: 3

## 2020-05-04 NOTE — PROGRESS NOTES
"Patient consented to telephone call.    Telephone/video visits are billed at different rates depending on your insurance coverage. During this emergency period, for some insurers they may be billed the same as an in-person visit.  Please reach out to your insurance provider with any questions.  If during the course of the call the physician/provider feels a telephone visit is not appropriate, you will not be charged for this service.    SUBJECTIVE: Phani is a 60 year old male who was referred by Leah Ford for [facility] services for diabetes management.  During last phone visit, discussed Ramadan preparation.  Today, following up since Ramadan has started.      Diabetes: Phani is taking half his dose of insulin.  He is taking 24 units insulin at nighttime and skipping his morning dose of insulin.  He is having trouble waking up before sunrise in the morning and so he has been skipping eating (and also medication - including insulin 24 units and metformin 1000 mg in the morning). So, he has been just taking 24units insulin at night and metformin 1000 mg at night.     He thinks he might be able to start waking up between 2-3 am to eat since he is becoming more accustomed to his new Ramadan lifestyle.    He is taking his blood sugar between 10 am-12pm and it has been in the 80s and 90s.He notes that he is feeling very hungry and shakey between 6-7pm.  This is a \"very hard time\" for him.  He has not been taking his blood sugar at this time because he is so focused on eating. He notes that he is personally OK breaking his fast if his blood sugar is below 70 and that he will start to monitor his blood sugar when he is feeling like this.    . He notes that his feet feel like they \"are heating like a fire.\"  He will sometimes use cold water to cool his feet and legs down. He wonders if he should be taking more vitamins and specifically asks for B12    Environmental factors that impact patient: Currently observing " [facility]      Patient Active Problem List   Diagnosis     Hypercholesteremia     CARDIOVASCULAR SCREENING; LDL GOAL LESS THAN 100     Hypertension goal BP (blood pressure) < 130/80     Type 2 diabetes, HbA1C goal < 8% (H)     Advanced directives, counseling/discussion     Impingement syndrome, shoulder     Pain in joint, shoulder region     Type 2 diabetes mellitus without complication (H)     Vitamin D deficiency        OBJECTIVE:     SAURAV-7 SCORE 11/20/2018 6/11/2019   Total Score 6 5       PHQ-9 SCORE 11/20/2018 6/11/2019   PHQ-9 Total Score 4 8         VITALS:  BP Readings from Last 3 Encounters:   12/10/19 132/71   09/24/19 (!) 157/74   09/10/19 127/69           Weight:   Wt Readings from Last 1 Encounters:   12/10/19 171 lb (77.6 kg)       Height:   Ht Readings from Last 1 Encounters:   12/10/19 5' 7.8\" (172.2 cm)       LABORATORY VALUES:   Last A1C:    Lab Results   Component Value Date    A1C 7.1 12/10/2019   .    Last Basic Metabolic Panel:  Lab Results   Component Value Date     06/11/2019      Lab Results   Component Value Date    POTASSIUM 4.3 06/11/2019     Lab Results   Component Value Date    CHLORIDE 109 06/11/2019     Lab Results   Component Value Date    LIU 9.6 06/11/2019     Lab Results   Component Value Date    CO2 22 06/11/2019     Lab Results   Component Value Date    BUN 20 06/11/2019     Lab Results   Component Value Date    CR 0.99 06/11/2019     Lab Results   Component Value Date     06/11/2019       Lipid Panel Labs  Lab Results   Component Value Date    CHOL 148 03/12/2019    CHOL 255 06/06/2014     Lab Results   Component Value Date    TRIG 102 03/12/2019    TRIG 247 06/06/2014     Lab Results   Component Value Date    HDL 38 03/12/2019    HDL 31 06/06/2014     Lab Results   Component Value Date    LDL 90 03/12/2019     06/06/2014       Hepatic Panel Labs  Lab Results   Component Value Date    AST 13 11/20/2018     Lab Results   Component Value Date    ALT 24 " 11/20/2018         SOCIAL AND FAMILY HISTORY  Social History     Tobacco Use     Smoking status: Never Smoker     Smokeless tobacco: Former User     Types: Snuff   Substance Use Topics     Alcohol use: No    .  Most Recent Immunizations   Administered Date(s) Administered     DTAP (<7y) 05/13/2003     DTaP, Unspecified 12/23/2011     FLU 6-35 months 12/09/2004     Flu, Unspecified 12/09/2004     Influenza (IIV3) PF 12/23/2011     Influenza Vaccine IM > 6 months Valent IIV4 11/01/2019     MMR 05/13/2003     Pneumococcal 23 valent 12/23/2011     TDAP Vaccine (Adacel) 12/23/2011     Td (Adult), Adsorbed 05/13/2003     Tdap (Adult) Unspecified Formulation 12/23/2011       CURRENT MEDICATIONS:   Current Outpatient Medications   Medication Sig Dispense Refill     amLODIPine (NORVASC) 10 MG tablet Take 1 tablet (10 mg) by mouth daily 90 tablet 1     aspirin (ASA) 81 MG chewable tablet Take 1 tablet (81 mg) by mouth daily 100 tablet 2     atorvastatin (LIPITOR) 40 MG tablet Take 1 tablet (40 mg) by mouth daily 90 tablet 1     blood glucose (NO BRAND SPECIFIED) test strip Use to test blood sugar 2 times daily or as directed. 100 each 1     blood glucose (NO BRAND SPECIFIED) test strip Test twice daily. 2 Box 2     blood glucose monitoring (ACCU-CHEK COMPACT CARE KIT) meter device kit Use to test blood sugars 1-2 times daily or as directed. 1 kit 0     blood glucose monitoring (ACCU-CHEK MULTICLIX) lancets Use to test blood sugar 1-2 times daily or as directed. 102 each prn     Blood Pressure Monitoring (PREMIUM AUTOMATIC BP MONITOR) CHIARA 1 Device daily 1 each 0     famotidine (PEPCID) 20 MG tablet Take 1 tablet (20 mg) by mouth 2 times daily 60 tablet 1     glucose (BD GLUCOSE) 4 g chewable tablet Take 2 tablets by mouth as needed (for blood sugar less than 70.) 20 tablet 6     glucose (BD GLUCOSE) 5 g chewable tablet Take 2 tablets (10 g) by mouth as needed (prn blood sugar < 70) 15 tablet 0     insulin glargine (LANTUS  PEN) 100 UNIT/ML pen Inject 25 Units Subcutaneous 2 times daily 15 mL 2     insulin pen needle (31G X 8 MM) 31G X 8 MM miscellaneous Use to inject insulin twice daily and Victoza once daily 100 each 11     liraglutide (VICTOZA) 18 MG/3ML solution Inject 1.2 mg Subcutaneous daily 6 mL 2     lisinopril (PRINIVIL/ZESTRIL) 40 MG tablet Take 1 tablet (40 mg) by mouth daily 90 tablet 1     metFORMIN (GLUCOPHAGE) 500 MG tablet Take 2 tablets (1,000 mg) by mouth 2 times daily (with meals) 120 tablet 2     metoprolol tartrate (LOPRESSOR) 100 MG tablet Take 1 tablet (100 mg) by mouth 2 times daily 60 tablet 5     nitroGLYcerin (NITROSTAT) 0.4 MG sublingual tablet Place 1 tablet (0.4 mg) under the tongue every 5 minutes as needed for chest pain for 3 doses. If symptoms persist 5 minutes after 1st dose call 911. 25 tablet 0     vitamin D3 (CHOLECALCIFEROL) 1000 units (25 mcg) tablet Take 1 tablet (1,000 Units) by mouth daily 100 tablet 3       ALLERGIES:     Allergies   Allergen Reactions     Latex Rash     Latex           ASSESSMENT:    Diabetes: At goal based on fasting SMBG.  However, patient might be at risk of hypoglycemia later in the day.  It will be important for him to monitor blood glucose throughout the day in order to get a sense for where his blood glucose is at.  Additionally, it will be best for him to eat at least twice daily (which he can do while still observing Ramadan) to further decrease his risk of low blood sugars.  MTP: Needs additional monitoring    Symptoms consistent with neuropathy: It will be best for Phani to be evaluated for his symptoms of burning feet/legs.  Given his long-term use of metformin, it is possible his B12 level is low and it might be best obtain a B12 level.  MTP: Potential medication adverse effect (metformin)      All medications were reviewed and found to be indicated, effective, safe and convenient unless drug therapy problem identified as described above.    PLAN:     -  Reinforced the importance of eating in the morning  -  We agreed that if his blood sugar is in the 60s, he should reduce his nightly insulin dose to 20 units. If his blood sugars are any lower than 60s, he will call the clinic.  In either case, if his blood sugars are below 70, he will correct with glucose tablets.  - Recommend an in-clinic visit for foot/leg exam and B12 level.  - Will follow up with patient over phone in one week    Options for treatment and/or follow-up care were reviewed with the patient. Phani Jeronimo was engaged and actively involved in the decision making process. He/She verbalized understanding of the options discussed and was satisfied with the final plan.    Patient was provided with written instructions/medication list via AVS.       Medical conditions reviewed: 2    Medications reviewed: 3    MTP identified: 2    Time spent: 20 minutes    Level of service: 3

## 2020-05-06 DIAGNOSIS — E11.9 TYPE 2 DIABETES MELLITUS WITHOUT COMPLICATION, WITH LONG-TERM CURRENT USE OF INSULIN (H): ICD-10-CM

## 2020-05-06 DIAGNOSIS — Z79.4 TYPE 2 DIABETES MELLITUS WITHOUT COMPLICATION, WITH LONG-TERM CURRENT USE OF INSULIN (H): ICD-10-CM

## 2020-05-07 ENCOUNTER — OFFICE VISIT (OUTPATIENT)
Dept: FAMILY MEDICINE | Facility: CLINIC | Age: 60
End: 2020-05-07
Payer: COMMERCIAL

## 2020-05-07 VITALS
BODY MASS INDEX: 26.84 KG/M2 | WEIGHT: 171 LBS | TEMPERATURE: 97.5 F | HEART RATE: 86 BPM | HEIGHT: 67 IN | DIASTOLIC BLOOD PRESSURE: 74 MMHG | SYSTOLIC BLOOD PRESSURE: 136 MMHG | OXYGEN SATURATION: 97 % | RESPIRATION RATE: 16 BRPM

## 2020-05-07 DIAGNOSIS — G62.9 PERIPHERAL POLYNEUROPATHY: ICD-10-CM

## 2020-05-07 DIAGNOSIS — Z79.4 TYPE 2 DIABETES MELLITUS WITH OTHER NEUROLOGIC COMPLICATION, WITH LONG-TERM CURRENT USE OF INSULIN (H): Primary | ICD-10-CM

## 2020-05-07 DIAGNOSIS — E11.49 TYPE 2 DIABETES MELLITUS WITH OTHER NEUROLOGIC COMPLICATION, WITH LONG-TERM CURRENT USE OF INSULIN (H): Primary | ICD-10-CM

## 2020-05-07 LAB
HBA1C MFR BLD: 7 % (ref 0–5.6)
VIT B12 SERPL-MCNC: 395 PG/ML (ref 193–986)

## 2020-05-07 RX ORDER — GABAPENTIN 100 MG/1
100 CAPSULE ORAL 3 TIMES DAILY
Qty: 90 CAPSULE | Refills: 1 | Status: SHIPPED | OUTPATIENT
Start: 2020-05-07 | End: 2020-07-14

## 2020-05-07 ASSESSMENT — MIFFLIN-ST. JEOR: SCORE: 1540.63

## 2020-05-07 NOTE — LETTER
May 13, 2020      Phani Jeronimo  [address]   [facility] [address]        Dear Mr.Mohamed Jeronimo,    We are writing to inform you of your test results.    Your vitamin D and B12 look great.   I don't think that is what is causing the burning on your legs.  Please let me know how you are doing on your new medication.    Resulted Orders   Vitamin B12   Result Value Ref Range    Vitamin B12 395 193 - 986 pg/mL   Hemoglobin A1c   Result Value Ref Range    Hemoglobin A1C 7.0 (H) 0 - 5.6 %      Comment:      Normal <5.7% Prediabetes 5.7-6.4%  Diabetes 6.5% or higher - adopted from ADA   consensus guidelines.     Vitamin D Level   Result Value Ref Range    Vitamin D Deficiency screening 52 20 - 75 ug/L      Comment:      Season, race, dietary intake, and treatment affect the concentration of   25-hydroxy-Vitamin D. Values may decrease during winter months and increase   during summer months. Values 20-29 ug/L may indicate Vitamin D insufficiency   and values <20 ug/L may indicate Vitamin D deficiency.  Vitamin D determination is routinely performed by an immunoassay specific for   25 hydroxyvitamin D3.  If an individual is on vitamin D2 (ergocalciferol)   supplementation, please specify 25 OH vitamin D2 and D3 level determination by   LCMSMS test [id number].         If you have any questions or concerns, please call the clinic at the number listed above.       Sincerely,        Malika Magaña NP

## 2020-05-07 NOTE — PROGRESS NOTES
Phani Jeronimo is a 60 year old male who presents today with a significant burning sensation for years in both lower legs.  He feels it most days now, he is scratching his lower legs    Of note, Phani is celebrating Ramadan and his blood sugars are lower before bed than usual.  He is keeping an eye on this and knows how to manage this.      Review Of Systems   ROS: 10 point ROS neg other than the symptoms noted above in the HPI.      Past Medical History:   Diagnosis Date     Albuminuria      Diabetes mellitus (H)      GERD (gastroesophageal reflux disease)      Hypercholesteremia      Hypertension      Past Surgical History:   Procedure Laterality Date     cataract repair Bilateral      Social History     Socioeconomic History     Marital status: Single     Spouse name: Not on file     Number of children: Not on file     Years of education: Not on file     Highest education level: Not on file   Occupational History     Occupation: unemployed   Social Needs     Financial resource strain: Not on file     Food insecurity     Worry: Not on file     Inability: Not on file     Transportation needs     Medical: Not on file     Non-medical: Not on file   Tobacco Use     Smoking status: Never Smoker     Smokeless tobacco: Former User     Types: Snuff   Substance and Sexual Activity     Alcohol use: No     Drug use: No     Sexual activity: Not Currently   Lifestyle     Physical activity     Days per week: 5 days     Minutes per session: 20 min     Stress: Not on file   Relationships     Social connections     Talks on phone: Not on file     Gets together: Not on file     Attends [employer] service: Not on file     Active member of club or organization: Not on file     Attends meetings of clubs or organizations: Not on file     Relationship status: Not on file     Intimate partner violence     Fear of current or ex partner: Not on file     Emotionally abused: Not on file     Physically abused: Not on file     Forced sexual  "activity: Not on file   Other Topics Concern     Parent/sibling w/ CABG, MI or angioplasty before 65F 55M? No   Social History Narrative    Currently unemployed. Lives in neighborhood        Walks most days.    Cooks own meals.     Currently does not have phone. Working on [employer]. 2019: Obtained new phone.      Family History   Problem Relation Age of Onset     Diabetes Mother          late 50's     Diabetes Father          54, Heart attack     Eye Disorder Father      Heart Disease Father      Heart Disease Paternal Uncle        /74   Pulse 86   Temp 97.5  F (36.4  C) (Oral)   Resp 16   Ht 1.712 m (5' 7.4\")   Wt 77.6 kg (171 lb)   SpO2 97%   BMI 26.47 kg/m      Exam:  Constitutional: healthy, alert and mild distress  Vascular:  Pedal, dorsalis pedis pulses 2+  Skin: Bilateral lower legs have scratches from self-scratching due to \"burning sensation\" in his lower extremeties.  Skin of feet very healthy appearing.  Neurologic: Gait normal. Reflexes normal and symmetric. Sensation grossly WNL.  Monofilament exam normal.    Psychiatric: mentation appears normal and affect normal/bright    Assessment/Plan:  1. Type 2 diabetes mellitus with other neurologic complication, with long-term current use of insulin (H)    - Hemoglobin A1c    2. Peripheral polyneuropathy  - Vitamin B12  - Vitamin D Level  - gabapentin (NEURONTIN) 100 MG capsule; Take 1 capsule (100 mg) by mouth 3 times daily  Dispense: 90 capsule; Refill: 1    We discussed use of medication, SE's, complications.  Phani will follow up in one week.    Options for treatment and follow-up care were reviewed with the patient. Patient engaged in the decision making process and verbalized understanding of the options discussed and agreed with the final plan.    "

## 2020-05-07 NOTE — NURSING NOTE
"60 year old  Chief Complaint   Patient presents with     Consult     Pt. presents to the clinic today with bilateral burning in feet.        Blood pressure 136/74, pulse 86, temperature 97.5  F (36.4  C), temperature source Oral, resp. rate 16, height 1.712 m (5' 7.4\"), weight 77.6 kg (171 lb), SpO2 97 %. Body mass index is 26.47 kg/m .  BP completed using cuff size:    Patient Active Problem List   Diagnosis     Hypercholesteremia     CARDIOVASCULAR SCREENING; LDL GOAL LESS THAN 100     Hypertension goal BP (blood pressure) < 130/80     Type 2 diabetes, HbA1C goal < 8% (H)     Advanced directives, counseling/discussion     Impingement syndrome, shoulder     Pain in joint, shoulder region     Type 2 diabetes mellitus without complication (H)     Vitamin D deficiency       Wt Readings from Last 2 Encounters:   05/07/20 77.6 kg (171 lb)   12/10/19 77.6 kg (171 lb)     BP Readings from Last 3 Encounters:   05/07/20 136/74   12/10/19 132/71   09/24/19 (!) 157/74       Allergies   Allergen Reactions     Latex Rash     Latex        Current Outpatient Medications   Medication     amLODIPine (NORVASC) 10 MG tablet     aspirin (ASA) 81 MG chewable tablet     atorvastatin (LIPITOR) 40 MG tablet     blood glucose (NO BRAND SPECIFIED) test strip     blood glucose (NO BRAND SPECIFIED) test strip     blood glucose monitoring (ACCU-CHEK COMPACT CARE KIT) meter device kit     blood glucose monitoring (ACCU-CHEK MULTICLIX) lancets     Blood Pressure Monitoring (PREMIUM AUTOMATIC BP MONITOR) CHIARA     famotidine (PEPCID) 20 MG tablet     glucose (BD GLUCOSE) 4 g chewable tablet     glucose (BD GLUCOSE) 5 g chewable tablet     insulin glargine (LANTUS PEN) 100 UNIT/ML pen     insulin pen needle (31G X 8 MM) 31G X 8 MM miscellaneous     liraglutide (VICTOZA) 18 MG/3ML solution     lisinopril (PRINIVIL/ZESTRIL) 40 MG tablet     metFORMIN (GLUCOPHAGE) 500 MG tablet     metoprolol tartrate (LOPRESSOR) 100 MG tablet     nitroGLYcerin " (NITROSTAT) 0.4 MG sublingual tablet     vitamin D3 (CHOLECALCIFEROL) 1000 units (25 mcg) tablet     No current facility-administered medications for this visit.        Social History     Tobacco Use     Smoking status: Never Smoker     Smokeless tobacco: Former User     Types: Snuff   Substance Use Topics     Alcohol use: No     Drug use: No       Honoring Choices - Health Care Directive Guide offered to patient at time of visit.    Health Maintenance Due   Topic Date Due     HEPATITIS C SCREENING  1960     HIV SCREENING  01/01/1973     ZOSTER IMMUNIZATION (1 of 2) 01/01/2008     PREVENTIVE CARE VISIT  03/12/2014     DIABETIC FOOT EXAM  06/12/2015     EYE EXAM  06/20/2015     ADVANCE CARE PLANNING  03/12/2018     PHQ-2  01/01/2020     LIPID  03/12/2020       Immunization History   Administered Date(s) Administered     DTAP (<7y) 04/02/2003, 05/13/2003     DTaP, Unspecified 12/23/2011     FLU 6-35 months 12/09/2004     Flu, Unspecified 12/09/2004     Influenza (IIV3) PF 12/23/2011     Influenza Vaccine IM > 6 months Valent IIV4 12/04/2018, 11/01/2019     MMR 04/02/2003, 05/13/2003     Pneumococcal 23 valent 12/23/2011     TDAP Vaccine (Adacel) 12/23/2011     Td (Adult), Adsorbed 04/02/2003, 05/13/2003     Tdap (Adult) Unspecified Formulation 12/23/2011       No results found for: PAP      Recent Labs   Lab Test 12/10/19  1327 09/10/19  1355 06/11/19  1325 06/11/19  1300  03/12/19  1157  11/20/18  1426 06/06/14  0945 02/18/14  0826 01/08/14  1521   A1C 7.1* 8.0* 8.5*  --    < >  --    < >  --  8.8* 9.1*  --    LDL  --   --   --   --   --  90  --   --  174* 110  --    HDL  --   --   --   --   --  38*  --   --  31* 35*  --    TRIG  --   --   --   --   --  102  --   --  247* 192*  --    ALT  --   --   --   --   --   --   --  24 28  --  30   CR  --   --   --  0.99  --   --   --  0.88 0.83  --  0.88   GFRESTIMATED  --   --   --  81  --   --   --  88 >90  --  90   GFRESTBLACK  --   --   --  >90  --   --   --  >90 >90   --  >90   ALBUMIN  --   --   --   --   --   --   --  4.3 4.1  --  4.1   POTASSIUM  --   --   --  4.3  --   --   --  4.5 4.4  --  4.3   TSH  --   --   --  1.35  --   --   --  1.83  --   --  1.83    < > = values in this interval not displayed.       PHQ-2 ( 1999 [employer]) 6/11/2019 12/4/2018   Q1: Little interest or pleasure in doing things 2 0   Q2: Feeling down, depressed or hopeless 1 0   PHQ-2 Score 3 0       PHQ-9 SCORE 11/20/2018 6/11/2019   PHQ-9 Total Score 4 8       SAURAV-7 SCORE 11/20/2018 6/11/2019   Total Score 6 5       No flowsheet data found.        Vannesa Noeln, [facility]  May 7, 2020 2:06 PM

## 2020-05-07 NOTE — LETTER
May 13, 2020      Phani Jeronimo  [address]   [facility] [address]        Dear Mr.Mohamed Jeronimo,    We are writing to inform you of your test results.    Your hemoglobin A1C looks great, you are doing a nice job with your diabetes.    Resulted Orders   Hemoglobin A1c   Result Value Ref Range    Hemoglobin A1C 7.0 (H) 0 - 5.6 %      Comment:      Normal <5.7% Prediabetes 5.7-6.4%  Diabetes 6.5% or higher - adopted from ADA   consensus guidelines.         If you have any questions or concerns, please call the clinic at the number listed above.       Sincerely,        Malika Magaña NP

## 2020-05-08 ENCOUNTER — VIRTUAL VISIT (OUTPATIENT)
Dept: PHARMACY | Facility: CLINIC | Age: 60
End: 2020-05-08
Payer: COMMERCIAL

## 2020-05-08 DIAGNOSIS — Z79.4 TYPE 2 DIABETES MELLITUS WITHOUT COMPLICATION, WITH LONG-TERM CURRENT USE OF INSULIN (H): Primary | ICD-10-CM

## 2020-05-08 DIAGNOSIS — E11.9 TYPE 2 DIABETES MELLITUS WITHOUT COMPLICATION, WITH LONG-TERM CURRENT USE OF INSULIN (H): Primary | ICD-10-CM

## 2020-05-08 LAB — DEPRECATED CALCIDIOL+CALCIFEROL SERPL-MC: 52 UG/L (ref 20–75)

## 2020-05-11 NOTE — PROGRESS NOTES
"Telephone/video visits are billed at different rates depending on your insurance coverage. During this emergency period, for some insurers they may be billed the same as an in-person visit.  Please reach out to your insurance provider with any questions.  If during the course of the call the physician/provider feels a telephone visit is not appropriate, you will not be charged for this service.    Phani consented to a telephone visit.    SUBJECTIVE: Phani is a 60 year old male who was referred by Leah Ford for [facility] services for glycemic management during Ramadan.  During last appointment, Phani noted symptoms that might be consistent with hypoglycemia that were occurring in the early evening.    Diabetes: Phani notes that now he is not feeling the shakiness in the evening. Since last appointment, he has added in an early morning meal as we discussed. Now he is waking up at 2:30 am and is eating.  This is making him feel \"stronger.\" He continues to take insulin 24 units at night.       He denies symptoms of low blood sugar.  Denies shaky, dizzy, sweaty. Blood sugar between 8-9am has been in the 120s-130s. He has forgotten to take his blood sugar readings in the evening as we previously discussed.    Neuropathy: He notes that he started gabapentin yesterday when it was prescribed.  He has noticed no changes in his symptoms at this point, but understands that the medication might take some time to work.    Environmental factors that impact patient: Observing Ramadan. Lives alone.      Patient Active Problem List   Diagnosis     Hypercholesteremia     CARDIOVASCULAR SCREENING; LDL GOAL LESS THAN 100     Hypertension goal BP (blood pressure) < 130/80     Type 2 diabetes, HbA1C goal < 8% (H)     Advanced directives, counseling/discussion     Impingement syndrome, shoulder     Pain in joint, shoulder region     Type 2 diabetes mellitus without complication (H)     Vitamin D deficiency        OBJECTIVE:     SAURAV-7 SCORE " "11/20/2018 6/11/2019   Total Score 6 5       PHQ-9 SCORE 11/20/2018 6/11/2019   PHQ-9 Total Score 4 8         VITALS:  BP Readings from Last 3 Encounters:   05/07/20 136/74   12/10/19 132/71   09/24/19 (!) 157/74           Weight:   Wt Readings from Last 1 Encounters:   05/07/20 171 lb (77.6 kg)       Height:   Ht Readings from Last 1 Encounters:   05/07/20 5' 7.4\" (171.2 cm)       LABORATORY VALUES:   Last A1C:    Lab Results   Component Value Date    A1C 7.0 05/07/2020   .    Last Basic Metabolic Panel:  Lab Results   Component Value Date     06/11/2019      Lab Results   Component Value Date    POTASSIUM 4.3 06/11/2019     Lab Results   Component Value Date    CHLORIDE 109 06/11/2019     Lab Results   Component Value Date    LIU 9.6 06/11/2019     Lab Results   Component Value Date    CO2 22 06/11/2019     Lab Results   Component Value Date    BUN 20 06/11/2019     Lab Results   Component Value Date    CR 0.99 06/11/2019     Lab Results   Component Value Date     06/11/2019       Lipid Panel Labs  Lab Results   Component Value Date    CHOL 148 03/12/2019    CHOL 255 06/06/2014     Lab Results   Component Value Date    TRIG 102 03/12/2019    TRIG 247 06/06/2014     Lab Results   Component Value Date    HDL 38 03/12/2019    HDL 31 06/06/2014     Lab Results   Component Value Date    LDL 90 03/12/2019     06/06/2014       Hepatic Panel Labs  Lab Results   Component Value Date    AST 13 11/20/2018     Lab Results   Component Value Date    ALT 24 11/20/2018         SOCIAL AND FAMILY HISTORY  Social History     Tobacco Use     Smoking status: Never Smoker     Smokeless tobacco: Former User     Types: Snuff   Substance Use Topics     Alcohol use: No    .  Most Recent Immunizations   Administered Date(s) Administered     DTAP (<7y) 05/13/2003     DTaP, Unspecified 12/23/2011     FLU 6-35 months 12/09/2004     Flu, Unspecified 12/09/2004     Influenza (IIV3) PF 12/23/2011     Influenza Vaccine IM > 6 " months Valent IIV4 11/01/2019     MMR 05/13/2003     Pneumococcal 23 valent 12/23/2011     TDAP Vaccine (Adacel) 12/23/2011     Td (Adult), Adsorbed 05/13/2003     Tdap (Adult) Unspecified Formulation 12/23/2011       CURRENT MEDICATIONS:   Current Outpatient Medications   Medication Sig Dispense Refill     amLODIPine (NORVASC) 10 MG tablet Take 1 tablet (10 mg) by mouth daily 90 tablet 1     aspirin (ASA) 81 MG chewable tablet Take 1 tablet (81 mg) by mouth daily 100 tablet 2     atorvastatin (LIPITOR) 40 MG tablet Take 1 tablet (40 mg) by mouth daily 90 tablet 1     blood glucose (NO BRAND SPECIFIED) test strip Use to test blood sugar 2 times daily or as directed. 100 each 1     blood glucose (NO BRAND SPECIFIED) test strip Test twice daily. 2 Box 2     blood glucose monitoring (ACCU-CHEK COMPACT CARE KIT) meter device kit Use to test blood sugars 1-2 times daily or as directed. 1 kit 0     blood glucose monitoring (ACCU-CHEK MULTICLIX) lancets Use to test blood sugar 1-2 times daily or as directed. 102 each prn     Blood Pressure Monitoring (PREMIUM AUTOMATIC BP MONITOR) CHIARA 1 Device daily 1 each 0     famotidine (PEPCID) 20 MG tablet Take 1 tablet (20 mg) by mouth 2 times daily 60 tablet 1     gabapentin (NEURONTIN) 100 MG capsule Take 1 capsule (100 mg) by mouth 3 times daily 90 capsule 1     glucose (BD GLUCOSE) 4 g chewable tablet Take 2 tablets by mouth as needed (for blood sugar less than 70.) 20 tablet 6     glucose (BD GLUCOSE) 5 g chewable tablet Take 2 tablets (10 g) by mouth as needed (prn blood sugar < 70) 15 tablet 0     insulin glargine (LANTUS PEN) 100 UNIT/ML pen Inject 25 Units Subcutaneous 2 times daily 15 mL 2     insulin pen needle (31G X 8 MM) 31G X 8 MM miscellaneous Use to inject insulin twice daily and Victoza once daily 100 each 11     liraglutide (VICTOZA) 18 MG/3ML solution Inject 1.2 mg Subcutaneous daily 6 mL 2     lisinopril (PRINIVIL/ZESTRIL) 40 MG tablet Take 1 tablet (40 mg) by  mouth daily 90 tablet 1     metFORMIN (GLUCOPHAGE) 500 MG tablet Take 2 tablets (1,000 mg) by mouth 2 times daily (with meals) 120 tablet 2     metoprolol tartrate (LOPRESSOR) 100 MG tablet Take 1 tablet (100 mg) by mouth 2 times daily 60 tablet 5     nitroGLYcerin (NITROSTAT) 0.4 MG sublingual tablet Place 1 tablet (0.4 mg) under the tongue every 5 minutes as needed for chest pain for 3 doses. If symptoms persist 5 minutes after 1st dose call 911. 25 tablet 0     vitamin D3 (CHOLECALCIFEROL) 1000 units (25 mcg) tablet Take 1 tablet (1,000 Units) by mouth daily 100 tablet 3       ALLERGIES:     Allergies   Allergen Reactions     Latex Rash     Latex           ASSESSMENT:    Diabetes: At goal based on blood glucose readings taken in the morning and A1c taken in clinic yesterday.  It is reassuring that Phani has not been experiencing symptoms consistent with hypoglycemia in the evening this past week. It is likely that eating in the morning is helping him to avoid hypoglycemia.  For his safety and also to track glycemic control, it would still be optimal to obtain at least one more reading during the day.    MTP: Needs additional monitoring.    Neuropathy: Not at goal per patient.  However, Phani just started taking gabapentin yesterday.  Denies side effects after one day of taking medication.      All medications were reviewed and found to be indicated, effective, safe and convenient unless drug therapy problem identified as described above.    PLAN:   - Reinforced importance of eating twice daily as allowed during Ramadan  - Discussed monitoring blood glucose more frequently during Ramadan. Phani will continue to monitor in morning, but will also monitor in evening    Options for treatment and/or follow-up care were reviewed with the patient. Phani Jeronimo was engaged and actively involved in the decision making process. He/She verbalized understanding of the options discussed and was satisfied with the final  plan.    Patient was provided with written instructions/medication list via AVS.       Medical conditions reviewed: 2    Medications reviewed: 3    MTP identified: 1    Time spent: 24 minutes    Level of service: 2

## 2020-05-14 ENCOUNTER — TELEPHONE (OUTPATIENT)
Dept: PHARMACY | Facility: CLINIC | Age: 60
End: 2020-05-14

## 2020-05-14 NOTE — TELEPHONE ENCOUNTER
Called patient to follow up after telephonic visit one week ago.  Left message noting that I would try to call him back again tomorrow.  Also advised patient that he can call and ask to speak with me sooner if needed.    Lenora Mo, ChinaD

## 2020-05-18 ENCOUNTER — VIRTUAL VISIT (OUTPATIENT)
Dept: PHARMACY | Facility: CLINIC | Age: 60
End: 2020-05-18
Payer: COMMERCIAL

## 2020-05-18 DIAGNOSIS — E11.9 TYPE 2 DIABETES MELLITUS WITHOUT COMPLICATION, WITH LONG-TERM CURRENT USE OF INSULIN (H): Primary | ICD-10-CM

## 2020-05-18 DIAGNOSIS — Z79.4 TYPE 2 DIABETES MELLITUS WITHOUT COMPLICATION, WITH LONG-TERM CURRENT USE OF INSULIN (H): Primary | ICD-10-CM

## 2020-05-19 NOTE — PATIENT INSTRUCTIONS
Phani, it was nice speaking with you over the phone.  Thank you for tracking your blood sugar - I am pleased with how it looks.  I will follow up with you for a phone appointment on June 1 at 1:30 as we planned. Please feel free to call the clinic before then if you have questions on concerns.  Lenora Mo, ChinaD

## 2020-05-19 NOTE — PROGRESS NOTES
Telephone/video visits are billed at different rates depending on your insurance coverage. During this emergency period, for some insurers they may be billed the same as an in-person visit.  Please reach out to your insurance provider with any questions.  If during the course of the call the physician/provider feels a telephone visit is not appropriate, you will not be charged for this service.    Phani consented to a phone visit today.    SUBJECTIVE: Phani is a 60 year old male who was referred by Leah Ford for [facility] services for blood glucose management during Ramadan.      Diabetes: Continues insulin 24 units at night and metformin 1000 mg at night. He has been tracking his blood sugar and notes that morning readings are  (taken between 10 am and noon) and evening readings are 70-80 (taken before his evening meal).  One time saw 65 reading before evening meal, but he was able to eat to correct this blood sugar simply by eating. Denies any other symptoms or occurences of hypoglycemia.     Neuropathy: He feels that gabapentin has started working, but he wonders if it has caused him a headache.  However, he is unsure if the occasional headache might be due to changes related to observing Ramadan. He wants to wait until a weeks after Ramadan is over to connect again around these symptoms.     Past issue of diarrhea: Several weeks ago, he noted that he had diarrhea which he thought was due to famotidine.  He has resumed famotidine, but does not think that famotidine is causing diarrhea. However, he feels that fasting during Ramadan may be helping his GERD.     Environmental factors that impact patient: Observing Ramadan      Patient Active Problem List   Diagnosis     Hypercholesteremia     CARDIOVASCULAR SCREENING; LDL GOAL LESS THAN 100     Hypertension goal BP (blood pressure) < 130/80     Type 2 diabetes, HbA1C goal < 8% (H)     Advanced directives, counseling/discussion     Impingement syndrome, shoulder  "    Pain in joint, shoulder region     Type 2 diabetes mellitus without complication (H)     Vitamin D deficiency        OBJECTIVE:     SAURAV-7 SCORE 11/20/2018 6/11/2019   Total Score 6 5       PHQ-9 SCORE 11/20/2018 6/11/2019   PHQ-9 Total Score 4 8         VITALS:  BP Readings from Last 3 Encounters:   05/07/20 136/74   12/10/19 132/71   09/24/19 (!) 157/74           Weight:   Wt Readings from Last 1 Encounters:   05/07/20 171 lb (77.6 kg)       Height:   Ht Readings from Last 1 Encounters:   05/07/20 5' 7.4\" (171.2 cm)       LABORATORY VALUES:   Last A1C:    Lab Results   Component Value Date    A1C 7.0 05/07/2020   .    Last Basic Metabolic Panel:  Lab Results   Component Value Date     06/11/2019      Lab Results   Component Value Date    POTASSIUM 4.3 06/11/2019     Lab Results   Component Value Date    CHLORIDE 109 06/11/2019     Lab Results   Component Value Date    LIU 9.6 06/11/2019     Lab Results   Component Value Date    CO2 22 06/11/2019     Lab Results   Component Value Date    BUN 20 06/11/2019     Lab Results   Component Value Date    CR 0.99 06/11/2019     Lab Results   Component Value Date     06/11/2019       Lipid Panel Labs  Lab Results   Component Value Date    CHOL 148 03/12/2019    CHOL 255 06/06/2014     Lab Results   Component Value Date    TRIG 102 03/12/2019    TRIG 247 06/06/2014     Lab Results   Component Value Date    HDL 38 03/12/2019    HDL 31 06/06/2014     Lab Results   Component Value Date    LDL 90 03/12/2019     06/06/2014       Hepatic Panel Labs  Lab Results   Component Value Date    AST 13 11/20/2018     Lab Results   Component Value Date    ALT 24 11/20/2018         SOCIAL AND FAMILY HISTORY  Social History     Tobacco Use     Smoking status: Never Smoker     Smokeless tobacco: Former User     Types: Snuff   Substance Use Topics     Alcohol use: No    .  Most Recent Immunizations   Administered Date(s) Administered     DTAP (<7y) 05/13/2003     DTaP, " Unspecified 12/23/2011     FLU 6-35 months 12/09/2004     Flu, Unspecified 12/09/2004     Influenza (IIV3) PF 12/23/2011     Influenza Vaccine IM > 6 months Valent IIV4 11/01/2019     MMR 05/13/2003     Pneumococcal 23 valent 12/23/2011     TDAP Vaccine (Adacel) 12/23/2011     Td (Adult), Adsorbed 05/13/2003     Tdap (Adult) Unspecified Formulation 12/23/2011       CURRENT MEDICATIONS:   Current Outpatient Medications   Medication Sig Dispense Refill     amLODIPine (NORVASC) 10 MG tablet Take 1 tablet (10 mg) by mouth daily 90 tablet 1     aspirin (ASA) 81 MG chewable tablet Take 1 tablet (81 mg) by mouth daily 100 tablet 2     atorvastatin (LIPITOR) 40 MG tablet Take 1 tablet (40 mg) by mouth daily 90 tablet 1     blood glucose (NO BRAND SPECIFIED) test strip Use to test blood sugar 2 times daily or as directed. 100 each 1     blood glucose (NO BRAND SPECIFIED) test strip Test twice daily. 2 Box 2     blood glucose monitoring (ACCU-CHEK COMPACT CARE KIT) meter device kit Use to test blood sugars 1-2 times daily or as directed. 1 kit 0     blood glucose monitoring (ACCU-CHEK MULTICLIX) lancets Use to test blood sugar 1-2 times daily or as directed. 102 each prn     Blood Pressure Monitoring (PREMIUM AUTOMATIC BP MONITOR) CHIARA 1 Device daily 1 each 0     famotidine (PEPCID) 20 MG tablet Take 1 tablet (20 mg) by mouth 2 times daily 60 tablet 1     gabapentin (NEURONTIN) 100 MG capsule Take 1 capsule (100 mg) by mouth 3 times daily 90 capsule 1     glucose (BD GLUCOSE) 4 g chewable tablet Take 2 tablets by mouth as needed (for blood sugar less than 70.) 20 tablet 6     glucose (BD GLUCOSE) 5 g chewable tablet Take 2 tablets (10 g) by mouth as needed (prn blood sugar < 70) 15 tablet 0     insulin glargine (LANTUS PEN) 100 UNIT/ML pen Inject 25 Units Subcutaneous 2 times daily 15 mL 2     insulin pen needle (31G X 8 MM) 31G X 8 MM miscellaneous Use to inject insulin twice daily and Victoza once daily 100 each 11      liraglutide (VICTOZA) 18 MG/3ML solution Inject 1.2 mg Subcutaneous daily 6 mL 2     lisinopril (PRINIVIL/ZESTRIL) 40 MG tablet Take 1 tablet (40 mg) by mouth daily 90 tablet 1     metFORMIN (GLUCOPHAGE) 500 MG tablet Take 2 tablets (1,000 mg) by mouth 2 times daily (with meals) 120 tablet 2     metoprolol tartrate (LOPRESSOR) 100 MG tablet Take 1 tablet (100 mg) by mouth 2 times daily 60 tablet 5     nitroGLYcerin (NITROSTAT) 0.4 MG sublingual tablet Place 1 tablet (0.4 mg) under the tongue every 5 minutes as needed for chest pain for 3 doses. If symptoms persist 5 minutes after 1st dose call 911. 25 tablet 0     vitamin D3 (CHOLECALCIFEROL) 1000 units (25 mcg) tablet Take 1 tablet (1,000 Units) by mouth daily 100 tablet 3       ALLERGIES:     Allergies   Allergen Reactions     Latex Rash     Latex           ASSESSMENT:  Diabetes: At goal based on SMBG and most recent A1c.  Phani seems to be safely managing his diabetes now during Ramadan with a reduced amount of medication.  After Ramadan, he will likely need some additional coaching around increasing his insulin to a dose that was closer to pre-Ramadan dosing.    Neuropathy: At goal based on patient report, however, Phani may be experiencing an adverse medication effect. Phani's assessment that it is best to wait to determine if this is an adverse effect until after Ramadan passes makes sense.    Past issue of diarrhea: resolved, per patient.    All medications were reviewed and found to be indicated, effective, safe and convenient unless drug therapy problem identified as described above.    PLAN:   Follow up with patient over phone in 2 weeks.    Options for treatment and/or follow-up care were reviewed with the patient. Phani Denney Omar was engaged and actively involved in the decision making process. He/She verbalized understanding of the options discussed and was satisfied with the final plan.    Patient was provided with written instructions/medication list  via AVS.       Medical conditions reviewed: 3     Medications reviewed: 4    MTP identified: 0    Time spent: 10 minutes    Level of service: 1

## 2020-06-01 ENCOUNTER — VIRTUAL VISIT (OUTPATIENT)
Dept: PHARMACY | Facility: CLINIC | Age: 60
End: 2020-06-01
Payer: COMMERCIAL

## 2020-06-01 DIAGNOSIS — E11.9 TYPE 2 DIABETES MELLITUS WITHOUT COMPLICATION, WITH LONG-TERM CURRENT USE OF INSULIN (H): Primary | ICD-10-CM

## 2020-06-01 DIAGNOSIS — Z79.4 TYPE 2 DIABETES MELLITUS WITHOUT COMPLICATION, WITH LONG-TERM CURRENT USE OF INSULIN (H): Primary | ICD-10-CM
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
## 2020-06-26 ENCOUNTER — VIRTUAL VISIT (OUTPATIENT)
Dept: PHARMACY | Facility: CLINIC | Age: 60
End: 2020-06-26
Payer: COMMERCIAL

## 2020-06-26 DIAGNOSIS — E11.9 TYPE 2 DIABETES MELLITUS WITHOUT COMPLICATION, WITH LONG-TERM CURRENT USE OF INSULIN (H): ICD-10-CM

## 2020-06-26 DIAGNOSIS — Z79.4 TYPE 2 DIABETES MELLITUS WITH DIABETIC NEPHROPATHY, WITH LONG-TERM CURRENT USE OF INSULIN (H): ICD-10-CM

## 2020-06-26 DIAGNOSIS — E11.21 TYPE 2 DIABETES MELLITUS WITH DIABETIC NEPHROPATHY, WITH LONG-TERM CURRENT USE OF INSULIN (H): ICD-10-CM

## 2020-06-26 DIAGNOSIS — Z79.4 TYPE 2 DIABETES MELLITUS WITHOUT COMPLICATION, WITH LONG-TERM CURRENT USE OF INSULIN (H): ICD-10-CM

## 2020-06-26 NOTE — Clinical Note
Virgil Daly,  I have been following Phani to help him with his insulin adjustment post-Ramadan.  He is so happy with the gabapentin that you prescribed for his neuropathy.  No bothersome side effects and he thinks the dose is just right.  He is requesting a refill (will be out July 7).  I just wanted to check in with you on this to make sure you didn't want to follow up with him personally first regarding this.    Lenora

## 2020-06-29 NOTE — PROGRESS NOTES
Telephone/video visits are billed at different rates depending on your insurance coverage. During this emergency period, for some insurers they may be billed the same as an in-person visit.  Please reach out to your insurance provider with any questions.  If during the course of the call the physician/provider feels a telephone visit is not appropriate, you will not be charged for this service.     Phani consented to a phone visit today.    SUBJECTIVE:Subjective: Phani is a 60 year old male who was referred by Leah Ford for [facility] services for blood glucose management.     Diabetes: Phani is taking insulin glargine (Lantus) 24 units in the morning and 20 units in the evening. Additionally, he is taking Metformin 500mg - 2 tablets in the morning and 2 tablets in the evening and Victoza 1.2mg daily.     Phani tests his blood sugar twice a day, morning and evening but will occasionally forget in the evening.  He reported the morning fasting sugars are usually 80-90 but about twice a week they are below 70 with the lowest reading of 60. He does sometimes feel shaky when he has low blood sugar but will eat fruit or something with sugar and feels better in 30 minutes. Post-prandial sugars are usually 100-110 but can be up to 170 if right after dinner.     Neuropathy: Phani is taking gabapentin 100mg capsules three times a day. He will occasionally miss 1 dose. He stated his pain has improved by 70% and is very happy with where it is currently at.  He is not interested in any dose adjustments. The side effects of feeling groggy and dizzy have improved and he feels are better controlled.     Environmental factors:He states he is able to use public transportation to get to the grocery store and tries to buy sugar free foods. He goes for walks everyday but expressed he would like to get more exercise.     Patient Active Problem List   Diagnosis     Hypercholesteremia     CARDIOVASCULAR SCREENING; LDL GOAL LESS THAN 100      "Hypertension goal BP (blood pressure) < 130/80     Type 2 diabetes, HbA1C goal < 8% (H)     Advanced directives, counseling/discussion     Impingement syndrome, shoulder     Pain in joint, shoulder region     Type 2 diabetes mellitus without complication (H)     Vitamin D deficiency        OBJECTIVE:     SAURAV-7 SCORE 11/20/2018 6/11/2019   Total Score 6 5       PHQ-9 SCORE 11/20/2018 6/11/2019   PHQ-9 Total Score 4 8         VITALS:  BP Readings from Last 3 Encounters:   05/07/20 136/74   12/10/19 132/71   09/24/19 (!) 157/74           Weight:   Wt Readings from Last 1 Encounters:   05/07/20 171 lb (77.6 kg)       Height:   Ht Readings from Last 1 Encounters:   05/07/20 5' 7.4\" (171.2 cm)       LABORATORY VALUES:   Last A1C:    Lab Results   Component Value Date    A1C 7.0 05/07/2020   .    Last Basic Metabolic Panel:  Lab Results   Component Value Date     06/11/2019      Lab Results   Component Value Date    POTASSIUM 4.3 06/11/2019     Lab Results   Component Value Date    CHLORIDE 109 06/11/2019     Lab Results   Component Value Date    LIU 9.6 06/11/2019     Lab Results   Component Value Date    CO2 22 06/11/2019     Lab Results   Component Value Date    BUN 20 06/11/2019     Lab Results   Component Value Date    CR 0.99 06/11/2019     Lab Results   Component Value Date     06/11/2019       Lipid Panel Labs  Lab Results   Component Value Date    CHOL 148 03/12/2019    CHOL 255 06/06/2014     Lab Results   Component Value Date    TRIG 102 03/12/2019    TRIG 247 06/06/2014     Lab Results   Component Value Date    HDL 38 03/12/2019    HDL 31 06/06/2014     Lab Results   Component Value Date    LDL 90 03/12/2019     06/06/2014       Hepatic Panel Labs  Lab Results   Component Value Date    AST 13 11/20/2018     Lab Results   Component Value Date    ALT 24 11/20/2018         SOCIAL AND FAMILY HISTORY  Social History     Tobacco Use     Smoking status: Never Smoker     Smokeless tobacco: Former " User     Types: Snuff   Substance Use Topics     Alcohol use: No    .  Most Recent Immunizations   Administered Date(s) Administered     DTAP (<7y) 05/13/2003     DTaP, Unspecified 12/23/2011     FLU 6-35 months 12/09/2004     Flu, Unspecified 12/09/2004     Influenza (IIV3) PF 12/23/2011     Influenza Vaccine IM > 6 months Valent IIV4 11/01/2019     MMR 05/13/2003     Pneumococcal 23 valent 12/23/2011     TDAP Vaccine (Adacel) 12/23/2011     Td (Adult), Adsorbed 05/13/2003     Tdap (Adult) Unspecified Formulation 12/23/2011       CURRENT MEDICATIONS:   Current Outpatient Medications   Medication Sig Dispense Refill     amLODIPine (NORVASC) 10 MG tablet Take 1 tablet (10 mg) by mouth daily 90 tablet 1     aspirin (ASA) 81 MG chewable tablet Take 1 tablet (81 mg) by mouth daily 100 tablet 2     atorvastatin (LIPITOR) 40 MG tablet Take 1 tablet (40 mg) by mouth daily 90 tablet 1     blood glucose (NO BRAND SPECIFIED) test strip Use to test blood sugar 2 times daily or as directed. 100 each 1     blood glucose (NO BRAND SPECIFIED) test strip Test twice daily. 2 Box 2     blood glucose monitoring (ACCU-CHEK COMPACT CARE KIT) meter device kit Use to test blood sugars 1-2 times daily or as directed. 1 kit 0     blood glucose monitoring (ACCU-CHEK MULTICLIX) lancets Use to test blood sugar 1-2 times daily or as directed. 102 each prn     Blood Pressure Monitoring (PREMIUM AUTOMATIC BP MONITOR) CHIARA 1 Device daily 1 each 0     famotidine (PEPCID) 20 MG tablet Take 1 tablet (20 mg) by mouth 2 times daily 60 tablet 1     gabapentin (NEURONTIN) 100 MG capsule Take 1 capsule (100 mg) by mouth 3 times daily 90 capsule 1     glucose (BD GLUCOSE) 4 g chewable tablet Take 2 tablets by mouth as needed (for blood sugar less than 70.) 20 tablet 6     glucose (BD GLUCOSE) 5 g chewable tablet Take 2 tablets (10 g) by mouth as needed (prn blood sugar < 70) 15 tablet 0     insulin glargine (LANTUS PEN) 100 UNIT/ML pen Inject 25 Units  Subcutaneous 2 times daily 15 mL 2     insulin pen needle (31G X 8 MM) 31G X 8 MM miscellaneous Use to inject insulin twice daily and Victoza once daily 100 each 11     liraglutide (VICTOZA) 18 MG/3ML solution Inject 1.2 mg Subcutaneous daily 6 mL 2     lisinopril (PRINIVIL/ZESTRIL) 40 MG tablet Take 1 tablet (40 mg) by mouth daily 90 tablet 1     metFORMIN (GLUCOPHAGE) 500 MG tablet Take 2 tablets (1,000 mg) by mouth 2 times daily (with meals) 120 tablet 2     metoprolol tartrate (LOPRESSOR) 100 MG tablet Take 1 tablet (100 mg) by mouth 2 times daily 60 tablet 5     nitroGLYcerin (NITROSTAT) 0.4 MG sublingual tablet Place 1 tablet (0.4 mg) under the tongue every 5 minutes as needed for chest pain for 3 doses. If symptoms persist 5 minutes after 1st dose call 911. 25 tablet 0     vitamin D3 (CHOLECALCIFEROL) 1000 units (25 mcg) tablet Take 1 tablet (1,000 Units) by mouth daily 100 tablet 3       ALLERGIES:     Allergies   Allergen Reactions     Latex Rash     Latex           ASSESSMENT:  Diabetes: At goal based on recent A1c and self reported SMBG, however low readings are concerning. He is experiencing low fasting blood sugars twice a week based on SMBG and does get symptoms of shakiness.    MTP: safety - dose too high     Neuropathy: At goal based on patient report. Pain has improved from baseline and side effects have decreased. Patient is content with current regimen.     All medications were reviewed and found to be indicated, effective, safe, and convenient unless drug therapy problem identified as described above.     Plan:   -Through shared decision making: decrease insulin glargine to 20 units in the morning and 20 units in the evening.   -Phani will start to monitor how often he is experiencing low blood sugars less than 70.   -Follow up over the phone in 2 weeks to assess glycemic control and safety of insulin dose.   -Send in refill for Lantus, and pen needles as requested by patient  - Will touch base  with Malika Magaña to confirm refill of gabapentin.        All medications were reviewed and found to be indicated, effective, safe and convenient unless drug therapy problem identified as described above.    PLAN:       Patient was provided with written instructions/medication list via AVS.       Medical conditions reviewed: 2    Medications reviewed: 13    MTP identified: 1    Time spent: 20 minutes    Level of service: 2

## 2020-06-30 NOTE — PATIENT INSTRUCTIONS
Your blood sugars are looking great.  At this point, we want to continue to just make sure they are not going too low.  As we discussed, please change your insulin dosing to 20 units in the morning and 20 units in the evening.  Please also write down any time you have a blood sugar less than 70.

## 2020-07-09 DIAGNOSIS — I10 HYPERTENSION GOAL BP (BLOOD PRESSURE) < 130/80: ICD-10-CM

## 2020-07-09 DIAGNOSIS — E11.9 TYPE 2 DIABETES MELLITUS WITHOUT COMPLICATION, WITH LONG-TERM CURRENT USE OF INSULIN (H): ICD-10-CM

## 2020-07-09 DIAGNOSIS — Z79.4 TYPE 2 DIABETES MELLITUS WITHOUT COMPLICATION, WITH LONG-TERM CURRENT USE OF INSULIN (H): ICD-10-CM

## 2020-07-14 ENCOUNTER — VIRTUAL VISIT (OUTPATIENT)
Dept: PHARMACY | Facility: CLINIC | Age: 60
End: 2020-07-14
Payer: COMMERCIAL

## 2020-07-14 DIAGNOSIS — K21.9 GASTROESOPHAGEAL REFLUX DISEASE WITHOUT ESOPHAGITIS: ICD-10-CM

## 2020-07-14 DIAGNOSIS — G62.9 PERIPHERAL POLYNEUROPATHY: ICD-10-CM

## 2020-07-14 RX ORDER — LIRAGLUTIDE 6 MG/ML
1.2 INJECTION SUBCUTANEOUS DAILY
Qty: 6 ML | Refills: 0 | Status: SHIPPED | OUTPATIENT
Start: 2020-07-14 | End: 2020-09-08

## 2020-07-14 RX ORDER — LISINOPRIL 40 MG/1
40 TABLET ORAL DAILY
Qty: 90 TABLET | Refills: 0 | Status: SHIPPED | OUTPATIENT
Start: 2020-07-14 | End: 2020-08-11

## 2020-07-14 RX ORDER — GABAPENTIN 100 MG/1
100 CAPSULE ORAL 3 TIMES DAILY
Qty: 90 CAPSULE | Refills: 0 | Status: SHIPPED | OUTPATIENT
Start: 2020-07-14 | End: 2020-08-11

## 2020-07-14 RX ORDER — FAMOTIDINE 20 MG/1
20 TABLET, FILM COATED ORAL 2 TIMES DAILY
Qty: 60 TABLET | Refills: 0 | Status: SHIPPED | OUTPATIENT
Start: 2020-07-14 | End: 2020-08-13

## 2020-07-14 NOTE — Clinical Note
Virgil Daly,  Just saw Phani yesterday for a phone visit.  He is doing well.  I refilled his requested meds for a 30 day supply and then asked him to schedule a visit with you in early August to get an A1c and BMP and to follow up on neuropathy.  When you see him and review labs, you might chose to give a bigger supply of refills if all is still well. Fred Cooley

## 2020-07-14 NOTE — TELEPHONE ENCOUNTER
liraglutide (VICTOZA) 18 MG/3ML   Last Written Prescription Date:  4/6/20  Last Fill Quantity: 6ml,   # refills: 2  Last Office Visit : 5/7/20  Future Office visit:  7/16/20  Routing refill request to provider for review/approval because:  OVER DUE CR      90 DAY RF PEDNING    lisinopril 40 MG     Last Written Prescription Date:  12/10/19  Last Fill Quantity: 90,   # refills: 1  Routing refill request to provider for review/approval because:  OVER DUE CR   K+      90 DAY RF PEDNING

## 2020-07-15 NOTE — PROGRESS NOTES
Telephone/video visits are billed at different rates depending on your insurance coverage. During this emergency period, for some insurers they may be billed the same as an in-person visit.  Please reach out to your insurance provider with any questions.  If during the course of the call the physician/provider feels a telephone visit is not appropriate, you will not be charged for this service.     Phani consented to a phone visit.    Subjective:     Diabetes - Phani is taking insulin glargine (Lantus) 20 units in the morning and 20 units in the evening, metformin, and Victoza. He tests his blood sugar every morning before breakfast and occasionally in the evening 1-2 hours after dinner. His morning fasting sugars have been between . His evening postprandial sugars have been 120-150 depending on how much sugar he has for dinner. He has not had any low blood sugars (below 70) or symptoms of hypoglycemia in the past 2 weeks.     Neuropathy - Phani is taking gabapentin 100mg capsules three times daily and stated his pain has improved by 80%. He feels as though his body is recovering. He is very pleased with gabapentin and wishes to remain on his current dose. When he first started gabapentin, he said he experienced dizziness, and sleepiness but does not feel this way anymore after his body has gotten used to the medication.     General health - Phani eats 3 meals a day if he feels hungry and tries to eat a lot of fruits, vegetables, and other traditional foods. He still likes to walk but wants to do more. Will wait about 2 weeks to start walking more until he feels that his body has fully recovered from neuropathy.    Medication needs: Phani states he went to pharmacy yesterday and was not able to  Victoza, famotidine, lisinopril, or gabapentin.       Patient Active Problem List   Diagnosis     Hypercholesteremia     CARDIOVASCULAR SCREENING; LDL GOAL LESS THAN 100     Hypertension goal BP (blood pressure) <  "130/80     Type 2 diabetes, HbA1C goal < 8% (H)     Advanced directives, counseling/discussion     Impingement syndrome, shoulder     Pain in joint, shoulder region     Type 2 diabetes mellitus without complication (H)     Vitamin D deficiency        OBJECTIVE:     SAURAV-7 SCORE 11/20/2018 6/11/2019   Total Score 6 5       PHQ-9 SCORE 11/20/2018 6/11/2019   PHQ-9 Total Score 4 8         VITALS:  BP Readings from Last 3 Encounters:   05/07/20 136/74   12/10/19 132/71   09/24/19 (!) 157/74           Weight:   Wt Readings from Last 1 Encounters:   05/07/20 171 lb (77.6 kg)       Height:   Ht Readings from Last 1 Encounters:   05/07/20 5' 7.4\" (171.2 cm)       LABORATORY VALUES:   Last A1C:    Lab Results   Component Value Date    A1C 7.0 05/07/2020   .    Last Basic Metabolic Panel:  Lab Results   Component Value Date     06/11/2019      Lab Results   Component Value Date    POTASSIUM 4.3 06/11/2019     Lab Results   Component Value Date    CHLORIDE 109 06/11/2019     Lab Results   Component Value Date    LIU 9.6 06/11/2019     Lab Results   Component Value Date    CO2 22 06/11/2019     Lab Results   Component Value Date    BUN 20 06/11/2019     Lab Results   Component Value Date    CR 0.99 06/11/2019     Lab Results   Component Value Date     06/11/2019       Lipid Panel Labs  Lab Results   Component Value Date    CHOL 148 03/12/2019    CHOL 255 06/06/2014     Lab Results   Component Value Date    TRIG 102 03/12/2019    TRIG 247 06/06/2014     Lab Results   Component Value Date    HDL 38 03/12/2019    HDL 31 06/06/2014     Lab Results   Component Value Date    LDL 90 03/12/2019     06/06/2014       Hepatic Panel Labs  Lab Results   Component Value Date    AST 13 11/20/2018     Lab Results   Component Value Date    ALT 24 11/20/2018         SOCIAL AND FAMILY HISTORY  Social History     Tobacco Use     Smoking status: Never Smoker     Smokeless tobacco: Former User     Types: Snuff   Substance Use " Topics     Alcohol use: No    .  Most Recent Immunizations   Administered Date(s) Administered     DTAP (<7y) 05/13/2003     DTaP, Unspecified 12/23/2011     FLU 6-35 months 12/09/2004     Flu, Unspecified 12/09/2004     Influenza (IIV3) PF 12/23/2011     Influenza Vaccine IM > 6 months Valent IIV4 11/01/2019     MMR 05/13/2003     Pneumococcal 23 valent 12/23/2011     TDAP Vaccine (Adacel) 12/23/2011     Td (Adult), Adsorbed 05/13/2003     Tdap (Adult) Unspecified Formulation 12/23/2011       CURRENT MEDICATIONS:   Current Outpatient Medications   Medication Sig Dispense Refill     famotidine (PEPCID) 20 MG tablet Take 1 tablet (20 mg) by mouth 2 times daily 60 tablet 0     gabapentin (NEURONTIN) 100 MG capsule Take 1 capsule (100 mg) by mouth 3 times daily 90 capsule 0     amLODIPine (NORVASC) 10 MG tablet Take 1 tablet (10 mg) by mouth daily 90 tablet 1     aspirin (ASA) 81 MG chewable tablet Take 1 tablet (81 mg) by mouth daily 100 tablet 2     atorvastatin (LIPITOR) 40 MG tablet Take 1 tablet (40 mg) by mouth daily 90 tablet 1     blood glucose (NO BRAND SPECIFIED) test strip Use to test blood sugar 2 times daily or as directed. 100 each 1     blood glucose (NO BRAND SPECIFIED) test strip Test twice daily. 2 Box 2     blood glucose monitoring (ACCU-CHEK COMPACT CARE KIT) meter device kit Use to test blood sugars 1-2 times daily or as directed. 1 kit 0     blood glucose monitoring (ACCU-CHEK MULTICLIX) lancets Use to test blood sugar 1-2 times daily or as directed. 102 each prn     Blood Pressure Monitoring (PREMIUM AUTOMATIC BP MONITOR) CHIARA 1 Device daily 1 each 0     glucose (BD GLUCOSE) 4 g chewable tablet Take 2 tablets by mouth as needed (for blood sugar less than 70.) 20 tablet 6     glucose (BD GLUCOSE) 5 g chewable tablet Take 2 tablets (10 g) by mouth as needed (prn blood sugar < 70) 15 tablet 0     insulin glargine (LANTUS PEN) 100 UNIT/ML pen Inject 20 Units Subcutaneous 2 times daily 15 mL 2      insulin pen needle (31G X 8 MM) 31G X 8 MM miscellaneous Use to inject insulin twice daily and Victoza once daily 100 each 11     liraglutide (VICTOZA) 18 MG/3ML solution Inject 1.2 mg Subcutaneous daily 6 mL 0     lisinopril (ZESTRIL) 40 MG tablet Take 1 tablet (40 mg) by mouth daily 90 tablet 0     metFORMIN (GLUCOPHAGE) 500 MG tablet Take 2 tablets (1,000 mg) by mouth 2 times daily (with meals) 120 tablet 2     metoprolol tartrate (LOPRESSOR) 100 MG tablet Take 1 tablet (100 mg) by mouth 2 times daily 60 tablet 5     nitroGLYcerin (NITROSTAT) 0.4 MG sublingual tablet Place 1 tablet (0.4 mg) under the tongue every 5 minutes as needed for chest pain for 3 doses. If symptoms persist 5 minutes after 1st dose call 911. 25 tablet 0     vitamin D3 (CHOLECALCIFEROL) 1000 units (25 mcg) tablet Take 1 tablet (1,000 Units) by mouth daily 100 tablet 3       ALLERGIES:     Allergies   Allergen Reactions     Latex Rash     Latex           Assessment:     Diabetes - At goal based on SMBG readings with no hypoglycemic episodes in the past 2 weeks.     Neuropathy - At goal per patient.  Gabapentin seems to be greatly improving patient's symptoms from baseline and patient is happy with current regimen.     All medications were reviewed and found to be indicated, effective, safe and convenient unless drug therapy problem identified as described above.    PLAN:     - No changes to medications   - Instructed patient to schedule an appointment early August for A1C and BMP as well as follow up related to neuropathy.   - Sent in 30 day supply refills for famotidine, victoza, gabapentin, and lisinopril to bridge patient to his NP visit.     Options for treatment and/or follow-up care were reviewed with the patient. Phani Jeronimo was engaged and actively involved in the decision making process. He/She verbalized understanding of the options discussed and was satisfied with the final plan.    Patient was provided with written  instructions/medication list via AVS.       Medical conditions reviewed: 2    Medications reviewed: 13    MTP identified: 0    Time spent: 20 minutes    Level of service:1

## 2020-07-16 ENCOUNTER — OFFICE VISIT (OUTPATIENT)
Dept: FAMILY MEDICINE | Facility: CLINIC | Age: 60
End: 2020-07-16
Payer: COMMERCIAL

## 2020-07-16 VITALS
OXYGEN SATURATION: 94 % | WEIGHT: 177.7 LBS | DIASTOLIC BLOOD PRESSURE: 74 MMHG | HEIGHT: 68 IN | BODY MASS INDEX: 26.93 KG/M2 | HEART RATE: 67 BPM | TEMPERATURE: 98.2 F | SYSTOLIC BLOOD PRESSURE: 125 MMHG

## 2020-07-16 DIAGNOSIS — E11.40 TYPE 2 DIABETES MELLITUS WITH DIABETIC NEUROPATHY, WITH LONG-TERM CURRENT USE OF INSULIN (H): Primary | ICD-10-CM

## 2020-07-16 DIAGNOSIS — Z79.4 TYPE 2 DIABETES MELLITUS WITH DIABETIC NEUROPATHY, WITH LONG-TERM CURRENT USE OF INSULIN (H): Primary | ICD-10-CM

## 2020-07-16 DIAGNOSIS — G63 POLYNEUROPATHY ASSOCIATED WITH UNDERLYING DISEASE (H): ICD-10-CM

## 2020-07-16 ASSESSMENT — MIFFLIN-ST. JEOR: SCORE: 1574.19

## 2020-07-16 NOTE — PROGRESS NOTES
Phani Jeronimo is a 60 year old male who presents today to discuss the burning in his feet, we believe from his diabetes.  He states that it is significantly better than when seen on 5/7/20.  He just feels a very little bit of discomfort in both feet.  He is working with Lenora Mo PharmD on his medications and he is doing well on Victoza.  He believes the gabepentin 100 mg three times daily is helpful for his neuropathy.  His A1C in May was 7.0, he will be due for that in August.    Review Of Systems   ROS: 10 point ROS neg other than the symptoms noted above in the HPI.    Past Medical History:   Diagnosis Date     Albuminuria      Diabetes mellitus (H)      GERD (gastroesophageal reflux disease)      Hypercholesteremia      Hypertension      Past Surgical History:   Procedure Laterality Date     cataract repair Bilateral      Social History     Socioeconomic History     Marital status: Single     Spouse name: Not on file     Number of children: Not on file     Years of education: Not on file     Highest education level: Not on file   Occupational History     Occupation: unemployed   Social Needs     Financial resource strain: Not on file     Food insecurity     Worry: Not on file     Inability: Not on file     Transportation needs     Medical: Not on file     Non-medical: Not on file   Tobacco Use     Smoking status: Never Smoker     Smokeless tobacco: Former User     Types: Snuff   Substance and Sexual Activity     Alcohol use: No     Drug use: No     Sexual activity: Not Currently   Lifestyle     Physical activity     Days per week: 5 days     Minutes per session: 20 min     Stress: Not on file   Relationships     Social connections     Talks on phone: Not on file     Gets together: Not on file     Attends L[employer] day service: Not on file     Active member of club or organization: Not on file     Attends meetings of clubs or organizations: Not on file     Relationship status: Not on file     Intimate  "partner violence     Fear of current or ex partner: Not on file     Emotionally abused: Not on file     Physically abused: Not on file     Forced sexual activity: Not on file   Other Topics Concern     Parent/sibling w/ CABG, MI or angioplasty before 65F 55M? No   Social History Narrative    Currently unemployed. Lives in neighborhood        Walks most days.    Cooks own meals.     Currently does not have phone. Working on [employer]. 2019: Obtained new phone.      Family History   Problem Relation Age of Onset     Diabetes Mother          late 50's     Diabetes Father          54, Heart attack     Eye Disorder Father      Heart Disease Father      Heart Disease Paternal Uncle        /74   Pulse 67   Temp 98.2  F (36.8  C) (Oral)   Ht 1.717 m (5' 7.6\")   Wt 80.6 kg (177 lb 11.2 oz)   SpO2 94%   BMI 27.34 kg/m      Exam:  Constitutional: healthy, alert and no distress  Skin: both feet skin intact, healthy, warm  Circulation:  Dorsalis pedis and post tibial pulses bilaterally present and strong  Neurologic: Gait normal. Reflexes normal and symmetric. Sensation grossly WNL, monofilament exam normal  Psychiatric: mentation appears normal and affect normal/bright    Assessment/Plan:  1. Type 2 diabetes mellitus with diabetic neuropathy, with long-term current use of insulin (H)    - Albumin Random Urine Quantitative with Creat Ratio; Future  - Basic metabolic panel; Future  - CBC with platelets differential; Future  - Comprehensive metabolic panel; Future    2. Polyneuropathy associated with underlying disease (H)  Continue gabepentin at present dose    He will continue to meet with Lenora and he will RTC for lab only in one month.      Options for treatment and follow-up care were reviewed with the patient. Patient engaged in the decision making process and verbalized understanding of the options discussed and agreed with the final plan.    "

## 2020-07-16 NOTE — NURSING NOTE
"60 year old  Chief Complaint   Patient presents with     Diabetes     Burning in feet       Blood pressure 125/74, pulse 67, temperature 98.2  F (36.8  C), temperature source Oral, height 1.717 m (5' 7.6\"), weight 80.6 kg (177 lb 11.2 oz), SpO2 94 %. Body mass index is 27.34 kg/m .  BP completed using cuff size:    Patient Active Problem List   Diagnosis     Hypercholesteremia     CARDIOVASCULAR SCREENING; LDL GOAL LESS THAN 100     Hypertension goal BP (blood pressure) < 130/80     Type 2 diabetes, HbA1C goal < 8% (H)     Advanced directives, counseling/discussion     Impingement syndrome, shoulder     Pain in joint, shoulder region     Type 2 diabetes mellitus without complication (H)     Vitamin D deficiency       Wt Readings from Last 2 Encounters:   07/16/20 80.6 kg (177 lb 11.2 oz)   05/07/20 77.6 kg (171 lb)     BP Readings from Last 3 Encounters:   07/16/20 125/74   05/07/20 136/74   12/10/19 132/71       Allergies   Allergen Reactions     Latex Rash     Latex        Current Outpatient Medications   Medication     amLODIPine (NORVASC) 10 MG tablet     aspirin (ASA) 81 MG chewable tablet     atorvastatin (LIPITOR) 40 MG tablet     blood glucose (NO BRAND SPECIFIED) test strip     blood glucose (NO BRAND SPECIFIED) test strip     blood glucose monitoring (ACCU-CHEK COMPACT CARE KIT) meter device kit     blood glucose monitoring (ACCU-CHEK MULTICLIX) lancets     Blood Pressure Monitoring (PREMIUM AUTOMATIC BP MONITOR) CHIARA     famotidine (PEPCID) 20 MG tablet     gabapentin (NEURONTIN) 100 MG capsule     insulin glargine (LANTUS PEN) 100 UNIT/ML pen     insulin pen needle (31G X 8 MM) 31G X 8 MM miscellaneous     liraglutide (VICTOZA) 18 MG/3ML solution     lisinopril (ZESTRIL) 40 MG tablet     metFORMIN (GLUCOPHAGE) 500 MG tablet     metoprolol tartrate (LOPRESSOR) 100 MG tablet     nitroGLYcerin (NITROSTAT) 0.4 MG sublingual tablet     vitamin D3 (CHOLECALCIFEROL) 1000 units (25 mcg) tablet     glucose (BD " GLUCOSE) 4 g chewable tablet     glucose (BD GLUCOSE) 5 g chewable tablet     No current facility-administered medications for this visit.        Social History     Tobacco Use     Smoking status: Never Smoker     Smokeless tobacco: Former User     Types: Snuff   Substance Use Topics     Alcohol use: No     Drug use: No       Honoring Choices - Health Care Directive Guide offered to patient at time of visit.    Health Maintenance Due   Topic Date Due     HEPATITIS C SCREENING  1960     HIV SCREENING  01/01/1973     HEPATITIS B IMMUNIZATION (1 of 3 - Risk 3-dose series) 01/01/1977     ZOSTER IMMUNIZATION (1 of 2) 01/01/2008     PREVENTIVE CARE VISIT  03/12/2014     DIABETIC FOOT EXAM  06/12/2015     EYE EXAM  06/20/2015     ADVANCE CARE PLANNING  03/12/2018     PHQ-2  01/01/2020     LIPID  03/12/2020     BMP  06/11/2020       Immunization History   Administered Date(s) Administered     DTAP (<7y) 04/02/2003, 05/13/2003     DTaP, Unspecified 12/23/2011     FLU 6-35 months 12/09/2004     Flu, Unspecified 12/09/2004     Influenza (IIV3) PF 12/23/2011     Influenza Vaccine IM > 6 months Valent IIV4 12/04/2018, 11/01/2019     MMR 04/02/2003, 05/13/2003     Pneumococcal 23 valent 12/23/2011     TDAP Vaccine (Adacel) 12/23/2011     Td (Adult), Adsorbed 04/02/2003, 05/13/2003     Tdap (Adult) Unspecified Formulation 12/23/2011       No results found for: PAP      Recent Labs   Lab Test 05/07/20  1428 12/10/19  1327 09/10/19  1355  06/11/19  1300  03/12/19  1157  11/20/18  1426 06/06/14  0945 02/18/14  0826 01/08/14  1521   A1C 7.0* 7.1* 8.0*   < >  --    < >  --    < >  --  8.8* 9.1*  --    LDL  --   --   --   --   --   --  90  --   --  174* 110  --    HDL  --   --   --   --   --   --  38*  --   --  31* 35*  --    TRIG  --   --   --   --   --   --  102  --   --  247* 192*  --    ALT  --   --   --   --   --   --   --   --  24 28  --  30   CR  --   --   --   --  0.99  --   --   --  0.88 0.83  --  0.88   GFRESTIMATED  --    --   --   --  81  --   --   --  88 >90  --  90   GFRESTBLACK  --   --   --   --  >90  --   --   --  >90 >90  --  >90   ALBUMIN  --   --   --   --   --   --   --   --  4.3 4.1  --  4.1   POTASSIUM  --   --   --   --  4.3  --   --   --  4.5 4.4  --  4.3   TSH  --   --   --   --  1.35  --   --   --  1.83  --   --  1.83    < > = values in this interval not displayed.       PHQ-2 ( 1999 [employer]) 6/11/2019 12/4/2018   Q1: Little interest or pleasure in doing things 2 0   Q2: Feeling down, depressed or hopeless 1 0   PHQ-2 Score 3 0       PHQ-9 SCORE 11/20/2018 6/11/2019   PHQ-9 Total Score 4 8       SAURAV-7 SCORE 11/20/2018 6/11/2019   Total Score 6 5       No flowsheet data found.      Jolene Savage, [facility]  July 16, 2020 9:49 AM

## 2020-08-06 DIAGNOSIS — I10 HYPERTENSION GOAL BP (BLOOD PRESSURE) < 130/80: ICD-10-CM

## 2020-08-06 DIAGNOSIS — G62.9 PERIPHERAL POLYNEUROPATHY: ICD-10-CM

## 2020-08-06 DIAGNOSIS — E11.9 TYPE 2 DIABETES MELLITUS WITHOUT COMPLICATION, WITH LONG-TERM CURRENT USE OF INSULIN (H): ICD-10-CM

## 2020-08-06 DIAGNOSIS — Z79.4 TYPE 2 DIABETES MELLITUS WITHOUT COMPLICATION, WITH LONG-TERM CURRENT USE OF INSULIN (H): ICD-10-CM

## 2020-08-06 NOTE — TELEPHONE ENCOUNTER
Patient calling stating that he needs his medications refilled.  Received refills and have been faxed over to patient pharmacy. Maye España RN on 8/6/2020 at 3:12 PM

## 2020-08-10 DIAGNOSIS — K21.9 GASTROESOPHAGEAL REFLUX DISEASE WITHOUT ESOPHAGITIS: ICD-10-CM

## 2020-08-11 NOTE — TELEPHONE ENCOUNTER
GABAPENTIN 100 MG CAPSULE    Last Written Prescription Date:  7/14/2020  Last Fill Quantity: 90,   # refills: 0  Last Office Visit : 7/16/2020  Future Office visit:  None  Routing refill request to provider for review/approval because:  Drug not on the [facility] or [facility] refill protocol or controlled substance        lisinopril (ZESTRIL) 40 MG tablet   Last Written Prescription Date:  7/14/2020  Last Fill Quantity: 90,   # refills: 0  Last Office Visit : 7/16/2020  Future Office visit:  None  Routing refill request to provider for review/approval because:  Labs Due:   Cr, K+       Refer to Provider for review         metFORMIN (GLUCOPHAGE) 500 MG tablet   Last Written Prescription Date:  5/7/2020  Last Fill Quantity: 120,   # refills: 2  Last Office Visit : 7/16/2020  Future Office visit:  None  Routing refill request to provider for review/approval because:  Abnormal Lab:   A1C     Refer to Provider for review  Lab Test 05/07/20  1428   A1C 7.0*       blood glucose (NO BRAND SPECIFIED) test strip   Last Written Prescription Date:  4/6/2020  Last Fill Quantity: 100,   # refills: 1  Last Office Visit : 7/16/2020  Future Office visit:  None  Routing refill request to provider for review/approval because:  Please send new updated order with updated Provider for script.   Last order was ordered by a Provider by Sukh Campos RN  Central Triage Red Flags/Med Refills

## 2020-08-13 RX ORDER — GABAPENTIN 100 MG/1
100 CAPSULE ORAL 3 TIMES DAILY
Qty: 90 CAPSULE | Refills: 1 | Status: SHIPPED | OUTPATIENT
Start: 2020-08-13 | End: 2020-10-13

## 2020-08-13 RX ORDER — LISINOPRIL 40 MG/1
40 TABLET ORAL DAILY
Qty: 90 TABLET | Refills: 1 | Status: SHIPPED | OUTPATIENT
Start: 2020-08-13 | End: 2020-09-08

## 2020-08-13 RX ORDER — FAMOTIDINE 20 MG/1
TABLET, FILM COATED ORAL
Qty: 60 TABLET | Refills: 11 | Status: SHIPPED | OUTPATIENT
Start: 2020-08-13 | End: 2020-09-08

## 2020-09-04 DIAGNOSIS — E11.9 TYPE 2 DIABETES MELLITUS WITHOUT COMPLICATION, WITH LONG-TERM CURRENT USE OF INSULIN (H): ICD-10-CM

## 2020-09-04 DIAGNOSIS — I10 BENIGN ESSENTIAL HYPERTENSION: ICD-10-CM

## 2020-09-04 DIAGNOSIS — Z79.4 TYPE 2 DIABETES MELLITUS WITHOUT COMPLICATION, WITH LONG-TERM CURRENT USE OF INSULIN (H): ICD-10-CM

## 2020-09-04 RX ORDER — METOPROLOL TARTRATE 100 MG
100 TABLET ORAL 2 TIMES DAILY
Qty: 60 TABLET | Refills: 5 | Status: CANCELLED | OUTPATIENT
Start: 2020-09-04

## 2020-09-04 RX ORDER — LIRAGLUTIDE 6 MG/ML
1.2 INJECTION SUBCUTANEOUS DAILY
Qty: 6 ML | Refills: 0 | Status: CANCELLED | OUTPATIENT
Start: 2020-09-04

## 2020-09-04 RX ORDER — AMLODIPINE BESYLATE 10 MG/1
10 TABLET ORAL DAILY
Qty: 90 TABLET | Refills: 1 | Status: CANCELLED | OUTPATIENT
Start: 2020-09-04

## 2020-09-08 DIAGNOSIS — E11.9 TYPE 2 DIABETES MELLITUS WITHOUT COMPLICATION, WITH LONG-TERM CURRENT USE OF INSULIN (H): ICD-10-CM

## 2020-09-08 DIAGNOSIS — I10 BENIGN ESSENTIAL HYPERTENSION: ICD-10-CM

## 2020-09-08 DIAGNOSIS — E11.649 TYPE 2 DIABETES MELLITUS WITH HYPOGLYCEMIA WITHOUT COMA, WITH LONG-TERM CURRENT USE OF INSULIN (H): ICD-10-CM

## 2020-09-08 DIAGNOSIS — I10 HYPERTENSION GOAL BP (BLOOD PRESSURE) < 130/80: ICD-10-CM

## 2020-09-08 DIAGNOSIS — Z79.4 TYPE 2 DIABETES MELLITUS WITH HYPOGLYCEMIA WITHOUT COMA, WITH LONG-TERM CURRENT USE OF INSULIN (H): ICD-10-CM

## 2020-09-08 DIAGNOSIS — E78.5 HYPERLIPIDEMIA LDL GOAL <100: ICD-10-CM

## 2020-09-08 DIAGNOSIS — I25.9 CHEST PAIN DUE TO MYOCARDIAL ISCHEMIA, UNSPECIFIED ISCHEMIC CHEST PAIN TYPE: ICD-10-CM

## 2020-09-08 DIAGNOSIS — Z79.4 TYPE 2 DIABETES MELLITUS WITHOUT COMPLICATION, WITH LONG-TERM CURRENT USE OF INSULIN (H): ICD-10-CM

## 2020-09-08 DIAGNOSIS — Z79.4 TYPE 2 DIABETES MELLITUS WITH DIABETIC NEPHROPATHY, WITH LONG-TERM CURRENT USE OF INSULIN (H): ICD-10-CM

## 2020-09-08 DIAGNOSIS — K21.9 GASTROESOPHAGEAL REFLUX DISEASE WITHOUT ESOPHAGITIS: ICD-10-CM

## 2020-09-08 DIAGNOSIS — E11.21 TYPE 2 DIABETES MELLITUS WITH DIABETIC NEPHROPATHY, WITH LONG-TERM CURRENT USE OF INSULIN (H): ICD-10-CM

## 2020-09-08 RX ORDER — AMLODIPINE BESYLATE 10 MG/1
10 TABLET ORAL DAILY
Qty: 90 TABLET | Refills: 1 | Status: SHIPPED | OUTPATIENT
Start: 2020-09-08 | End: 2020-09-08

## 2020-09-08 RX ORDER — LISINOPRIL 40 MG/1
40 TABLET ORAL DAILY
Qty: 90 TABLET | Refills: 1 | Status: SHIPPED | OUTPATIENT
Start: 2020-09-08 | End: 2021-01-01

## 2020-09-08 RX ORDER — NITROGLYCERIN 0.4 MG/1
0.4 TABLET SUBLINGUAL EVERY 5 MIN PRN
Qty: 25 TABLET | Refills: 0 | Status: SHIPPED | OUTPATIENT
Start: 2020-09-08 | End: 2020-11-17

## 2020-09-08 RX ORDER — METOPROLOL TARTRATE 100 MG
100 TABLET ORAL 2 TIMES DAILY
Qty: 60 TABLET | Refills: 5 | Status: SHIPPED | OUTPATIENT
Start: 2020-09-08 | End: 2021-01-01

## 2020-09-08 RX ORDER — FAMOTIDINE 20 MG/1
20 TABLET, FILM COATED ORAL 2 TIMES DAILY
Qty: 60 TABLET | Refills: 11 | Status: SHIPPED | OUTPATIENT
Start: 2020-09-08 | End: 2021-01-01

## 2020-09-08 RX ORDER — LIRAGLUTIDE 6 MG/ML
1.2 INJECTION SUBCUTANEOUS DAILY
Qty: 6 ML | Refills: 0 | Status: SHIPPED | OUTPATIENT
Start: 2020-09-08 | End: 2020-11-17

## 2020-09-08 RX ORDER — AMLODIPINE BESYLATE 10 MG/1
10 TABLET ORAL DAILY
Qty: 90 TABLET | Refills: 1 | Status: SHIPPED | OUTPATIENT
Start: 2020-09-08 | End: 2021-01-01

## 2020-09-08 RX ORDER — ATORVASTATIN CALCIUM 40 MG/1
40 TABLET, FILM COATED ORAL DAILY
Qty: 90 TABLET | Refills: 1 | Status: SHIPPED | OUTPATIENT
Start: 2020-09-08 | End: 2020-12-15

## 2020-09-08 RX ORDER — LIRAGLUTIDE 6 MG/ML
1.2 INJECTION SUBCUTANEOUS DAILY
Qty: 6 ML | Refills: 0 | Status: SHIPPED | OUTPATIENT
Start: 2020-09-08 | End: 2020-09-08

## 2020-09-22 ENCOUNTER — OFFICE VISIT (OUTPATIENT)
Dept: FAMILY MEDICINE | Facility: CLINIC | Age: 60
End: 2020-09-22
Payer: COMMERCIAL

## 2020-09-22 VITALS
SYSTOLIC BLOOD PRESSURE: 137 MMHG | BODY MASS INDEX: 26.89 KG/M2 | TEMPERATURE: 98.2 F | OXYGEN SATURATION: 95 % | WEIGHT: 177.4 LBS | DIASTOLIC BLOOD PRESSURE: 69 MMHG | HEART RATE: 76 BPM | HEIGHT: 68 IN

## 2020-09-22 DIAGNOSIS — Z13.220 LIPID SCREENING: ICD-10-CM

## 2020-09-22 DIAGNOSIS — I10 BENIGN ESSENTIAL HYPERTENSION: ICD-10-CM

## 2020-09-22 DIAGNOSIS — R25.2 MUSCLE CRAMPS: ICD-10-CM

## 2020-09-22 DIAGNOSIS — E11.9 TYPE 2 DIABETES MELLITUS WITHOUT COMPLICATION, WITH LONG-TERM CURRENT USE OF INSULIN (H): Primary | ICD-10-CM

## 2020-09-22 DIAGNOSIS — Z79.4 TYPE 2 DIABETES MELLITUS WITHOUT COMPLICATION, WITH LONG-TERM CURRENT USE OF INSULIN (H): Primary | ICD-10-CM

## 2020-09-22 LAB
ALBUMIN SERPL-MCNC: 4 G/DL (ref 3.4–5)
ALP SERPL-CCNC: 134 U/L (ref 40–150)
ALT SERPL W P-5'-P-CCNC: 19 U/L (ref 0–70)
ANION GAP SERPL CALCULATED.3IONS-SCNC: 8 MMOL/L (ref 3–14)
AST SERPL W P-5'-P-CCNC: 10 U/L (ref 0–45)
BILIRUB SERPL-MCNC: 0.4 MG/DL (ref 0.2–1.3)
BUN SERPL-MCNC: 18 MG/DL (ref 7–30)
CALCIUM SERPL-MCNC: 9.8 MG/DL (ref 8.5–10.1)
CHLORIDE SERPL-SCNC: 106 MMOL/L (ref 94–109)
CHOLEST SERPL-MCNC: 164 MG/DL
CO2 SERPL-SCNC: 24 MMOL/L (ref 20–32)
CREAT SERPL-MCNC: 0.99 MG/DL (ref 0.66–1.25)
CREAT UR-MCNC: 193 MG/DL
ERYTHROCYTE [DISTWIDTH] IN BLOOD BY AUTOMATED COUNT: 13 % (ref 10–15)
GFR SERPL CREATININE-BSD FRML MDRD: 81 ML/MIN/{1.73_M2}
GLUCOSE SERPL-MCNC: 191 MG/DL (ref 70–99)
HBA1C MFR BLD: 9.1 % (ref 4.1–5.7)
HCT VFR BLD AUTO: 45.3 % (ref 40–53)
HDLC SERPL-MCNC: 37 MG/DL
HGB BLD-MCNC: 14.6 G/DL (ref 13.3–17.7)
LDLC SERPL CALC-MCNC: 86 MG/DL
MAGNESIUM SERPL-MCNC: 1.7 MG/DL (ref 1.6–2.3)
MCH RBC QN AUTO: 28.5 PG (ref 26.5–33)
MCHC RBC AUTO-ENTMCNC: 32.2 G/DL (ref 31.5–36.5)
MCV RBC AUTO: 89 FL (ref 78–100)
MICROALBUMIN UR-MCNC: 67 MG/L
MICROALBUMIN/CREAT UR: 34.87 MG/G CR (ref 0–17)
NONHDLC SERPL-MCNC: 127 MG/DL
PLATELET # BLD AUTO: 289 10E9/L (ref 150–450)
POTASSIUM SERPL-SCNC: 4.3 MMOL/L (ref 3.4–5.3)
PROT SERPL-MCNC: 7.8 G/DL (ref 6.8–8.8)
RBC # BLD AUTO: 5.12 10E12/L (ref 4.4–5.9)
SODIUM SERPL-SCNC: 138 MMOL/L (ref 133–144)
TRIGL SERPL-MCNC: 207 MG/DL
WBC # BLD AUTO: 9.9 10E9/L (ref 4–11)

## 2020-09-22 ASSESSMENT — MIFFLIN-ST. JEOR: SCORE: 1582.36

## 2020-09-22 NOTE — PATIENT INSTRUCTIONS
Diabetes with neuropathy  Continue current medications  Continue to watch diet and get regular exercise  Labs today:  Complete blood count, comprehensive metabolic profile, urine albumin, 3 month blood sugar  Schedule diabetic eye exam  See dentist every year    Muscle cramps  Magnesium  B12 and vitamin D were checked in May 2020 and were within normal    Rash  Elevated legs at rest  Control blood sugar  Vaseline OK  Return to clinic if it worsens or you develop swelling  Already had flu vaccine

## 2020-09-22 NOTE — PROGRESS NOTES
Phani Jeronimo is a 60 year old who presents for       Chief Complaint   Patient presents with     Diabetes   Phani is a 60 year old male who presents for a diabetes, hyperlipidemia, and hypertension follow-up.   1. Diabetes: Patient reports elevated blood glucoses during a recent period of 1 month 10 days when patient was unable to fill Victoza prescription. Describes BS up to 300. Has been better past few days. Restarted Victoza on 9/11 or 9/12. He denies increase in thirst or urination. Patient reports no recent chest pain, breathing difficulties, or change in vision.   Diabetes Follow-up   <8.0   Patient is checking blood sugars: twice daily.   Blood sugar testing frequency justification: On insulin, frequency appropriate   Results are as follows:  Test blood sugar 1-2 times daily  -Last A1C was         Lab Results   Component Value Date    A1C 7.0 05/07/2020    A1C 7.1 12/10/2019    A1C 8.0 09/10/2019    A1C 8.5 06/11/2019    A1C 7.7 03/12/2019   Diabetic concerns: None and blood sugar frequently over 200 when unable to fill Victoza prescription for about 1 month   Chest Pain or exercise related calf pain (claudication):no  Symptoms of hypoglycemia (low blood sugar): none   Paresthesias (numbness or burning in feet) or sores: Yes previous pain in bilateral feet, presents today with healing sores on bilateral shins   Diabetic eye exam within the last year?: No, patient stated he will schedule tomorrow  2. Neuropathy: Tingling in feet much improved since started gabapentin.    3. New problem: rash: Patient also presents with new bilateral shin rash. Patient states this rash was itchy and he had open sores for a time. Denies swelling.  No known exposures to new products, pets, fleas etc. Mostly on lower legs but reports some rash on arms and back of neck also, but this has resolved. He states he has been placing vaseline on rash and it appears to be healing.   4. Muscle aches: Reports brief history of  painful muscle aching in bilateral lower extremities that has improved on Gabapentin and after control of BS.   5. Hyperlipidemia Follow-Up   Recommended Level of Therapy:High Intensity Statin (atorvastatin 40*-80 mg or rosuvastatin 20-40mg). He is on atorvastatin 40mg  Taking statin? Yes, muscle aches, possibly from other cause   Other lipid medications/supplements?: none        Lab Results   Component Value Date    CHOL 148 03/12/2019           Lab Results   Component Value Date    HDL 38 03/12/2019           Lab Results   Component Value Date    LDL 90 03/12/2019           Lab Results   Component Value Date    TRIG 102 03/12/2019           Lab Results   Component Value Date    CHOLHDLRATIO 8.1 06/06/2014   6. Hypertension Follow-up   <130/80   Outpatient blood pressures are not being checked.   Did patient take their HTN pills today? Yes  Last Basic Metabolic Panel:         Lab Results   Component Value Date     06/11/2019           Lab Results   Component Value Date    POTASSIUM 4.3 06/11/2019           Lab Results   Component Value Date    CHLORIDE 109 06/11/2019           Lab Results   Component Value Date    LIU 9.6 06/11/2019           Lab Results   Component Value Date    CO2 22 06/11/2019           Lab Results   Component Value Date    BUN 20 06/11/2019           Lab Results   Component Value Date    CR 0.99 06/11/2019           Lab Results   Component Value Date     06/11/2019   Exercise   Exercise:walking, Patient reports decreased physical activity   Problem, Medication and Allergy Lists were         Patient Active Problem List    Diagnosis Date Noted     Vitamin D deficiency 01/08/2019     Priority: Medium     Type 2 diabetes mellitus without complication (H) 10/15/2015     Priority: Medium     Pain in joint, shoulder region 03/11/2014     Priority: Medium     Impingement syndrome, shoulder 10/28/2013     Priority: Medium     Advanced directives, counseling/discussion 03/12/2013      Priority: Medium     Advance Care Planning:   ACP Review and Resources Provided: Reviewed chart for advance care plan. Chapo Cowan has no plan or code status on file. Discussed available resources and provided with information. Confirmed code status reflects current choices pending further ACP discussions. Confirmed/documented designated decision maker(s). See permanent comments section of demographics in clinical tab.   Added by Eduar Shaw on 3/12/2013      Type 2 diabetes, HbA1C goal < 8% (H) 04/26/2012     Priority: Medium     CARDIOVASCULAR SCREENING; LDL GOAL LESS THAN 100 12/23/2011     Priority: Medium     Hypertension goal BP (blood pressure) < 130/80 12/23/2011     Priority: Medium     Hypercholesteremia 12/10/2010     Priority: Medium   ,   Current Outpatient Prescriptions     Current Outpatient Medications   Medication     amLODIPine (NORVASC) 10 MG tablet     aspirin (ASA) 81 MG chewable tablet     atorvastatin (LIPITOR) 40 MG tablet     blood glucose (NO BRAND SPECIFIED) test strip     blood glucose (NO BRAND SPECIFIED) test strip     blood glucose monitoring (ACCU-CHEK COMPACT CARE KIT) meter device kit     blood glucose monitoring (ACCU-CHEK MULTICLIX) lancets     Blood Pressure Monitoring (PREMIUM AUTOMATIC BP MONITOR) CHIARA     famotidine (PEPCID) 20 MG tablet     gabapentin (NEURONTIN) 100 MG capsule     insulin glargine (LANTUS PEN) 100 UNIT/ML pen     insulin pen needle (31G X 8 MM) 31G X 8 MM miscellaneous     liraglutide (VICTOZA) 18 MG/3ML solution     lisinopril (ZESTRIL) 40 MG tablet     metFORMIN (GLUCOPHAGE) 500 MG tablet     metoprolol tartrate (LOPRESSOR) 100 MG tablet     nitroGLYcerin (NITROSTAT) 0.4 MG sublingual tablet     vitamin D3 (CHOLECALCIFEROL) 1000 units (25 mcg) tablet     glucose (BD GLUCOSE) 4 g chewable tablet     glucose (BD GLUCOSE) 5 g chewable tablet     No current facility-administered medications for this visit.             Allergies   Allergen Reactions  "    Latex Rash     Latex    .   Patient is an established patient of this clinic..   Review of Systems:   Review of Systems   GEN: Denies fever, weight change, fatigue  HEENT: EYES: Unsure of last eye exam.  Goes to [facility]. Had cataracts repaired in 2017. No current changes in vision.    CARDIAC: patient reports chest pain is gone, no recent nitroglycerin use. Denies irregular heart beats  RESP: no increased work of breathing, cough or shortness of breath. Recently had   a COVID test because it was offered and was free. Has had no symptoms.  GI: 1-2 days of loose stools last week, resolved spontaneously. Accompanied by slight nausea, no vomiting.   NEURO: ongoing neuropathy of lower extremities/feet; improved on gabapentin.   DERM: rash as above.     Physical Exam:   /69   Pulse 76   Temp 98.2  F (36.8  C) (Oral)   Ht 1.732 m (5' 8.2\")   Wt 80.5 kg (177 lb 6.4 oz)   SpO2 95%   BMI 26.82 kg/m      Body mass index is 26.82 kg/m .   Vitals were reviewed and were normal   Vital signs normal except mildly elevated blood pressure   Physical Exam   GEN: alert male in no acute distress  Eyes STACIE, discs crisp  RESP: clear to auscultation, good aeration throughout, no accessory muscle use.   CARDIAC: regular rate and rhythm. Peripheral pulses 2+, extremities warm. No peripheral edema noted.   SKIN: Skin LE slightly shiny and discolored in appearance but no edema. Few erythematous papules, scarring and scratch marks noted on bilateral shins with scabbed sores. Sensation intact to Monofilament on feet bilaterally. Calloused areas noted medial surface of great toes R>L and on heels.  Has flesh colored papule right third toe. Thickened great toe nails, evidence of old split right great toe.   Mood: appropriate to conversation.     Results:   Results are ordered and pending  A1c is 9.1 today, up considerably from previous, likely due to lapse in Victoza therapy.     Assessment and Plan   Assessment & " Liz   Phani was seen today for diabetes.   Reviewed current medications and identified when they would need refilling. Encouraged him to contact us before he runs out of medications.   Diagnoses and all orders for this visit:   Type 2 diabetes mellitus without complication, with long-term current use of insulin (H)   - Hemoglobin A1c (AP [facility])   - Comprehensive metabolic panel   - CBC with platelets   - Albumin Random Urine Quantitative with Creat Ratio   Continue current medications. Continue to watch diet and get regular exercise.   Complete blood count, comprehensive metabolic profile, urine albumin, Hgb A1C.   Schedule diabetic eye exam and recommend dentist every year.   Already received flu vaccine     Benign essential hypertension   - Comprehensive metabolic panel   - CBC with platelets   Continue current medications    Muscle cramps   - Magnesium   B12 and vitamin D were checked in May 2020 and were within normal. Could consider CK if recurs    Lipid screening   - Lipid panel reflex to direct LDL Fasting   There are no discontinued medications.     Rash  Monitor rash. Elevate legs at rest, control blood sugar, vaseline OK.   Return to clinic if it worsens or you develop swelling.     Options for treatment and follow-up care were reviewed with the patient. Phani Jeronimo engaged in the decision making process and verbalized understanding of the options discussed and agreed with the final plan.     I was present with the [facility] FNP student,  Jaja Franco RN, who participated in the service and in the documentation of the services provided. I have verified the history and personally performed the physical exam and medical decision making, as documented by the student and edited by me    RICARDO De Luna CNP  09/22/20  3:00 PM      RICARDO De Luna CNP

## 2020-09-22 NOTE — NURSING NOTE
"60 year old  Chief Complaint   Patient presents with     Diabetes       Blood pressure 137/69, pulse 76, temperature 98.2  F (36.8  C), temperature source Oral, height 1.732 m (5' 8.2\"), weight 80.5 kg (177 lb 6.4 oz), SpO2 95 %. Body mass index is 26.82 kg/m .  BP completed using cuff size:    Patient Active Problem List   Diagnosis     Hypercholesteremia     CARDIOVASCULAR SCREENING; LDL GOAL LESS THAN 100     Hypertension goal BP (blood pressure) < 130/80     Type 2 diabetes, HbA1C goal < 8% (H)     Advanced directives, counseling/discussion     Impingement syndrome, shoulder     Pain in joint, shoulder region     Type 2 diabetes mellitus without complication (H)     Vitamin D deficiency       Wt Readings from Last 2 Encounters:   09/22/20 80.5 kg (177 lb 6.4 oz)   07/16/20 80.6 kg (177 lb 11.2 oz)     BP Readings from Last 3 Encounters:   09/22/20 137/69   07/16/20 125/74   05/07/20 136/74       Allergies   Allergen Reactions     Latex Rash     Latex        Current Outpatient Medications   Medication     amLODIPine (NORVASC) 10 MG tablet     aspirin (ASA) 81 MG chewable tablet     atorvastatin (LIPITOR) 40 MG tablet     blood glucose (NO BRAND SPECIFIED) test strip     blood glucose (NO BRAND SPECIFIED) test strip     blood glucose monitoring (ACCU-CHEK COMPACT CARE KIT) meter device kit     blood glucose monitoring (ACCU-CHEK MULTICLIX) lancets     Blood Pressure Monitoring (PREMIUM AUTOMATIC BP MONITOR) CHIARA     famotidine (PEPCID) 20 MG tablet     gabapentin (NEURONTIN) 100 MG capsule     insulin glargine (LANTUS PEN) 100 UNIT/ML pen     insulin pen needle (31G X 8 MM) 31G X 8 MM miscellaneous     liraglutide (VICTOZA) 18 MG/3ML solution     lisinopril (ZESTRIL) 40 MG tablet     metFORMIN (GLUCOPHAGE) 500 MG tablet     metoprolol tartrate (LOPRESSOR) 100 MG tablet     nitroGLYcerin (NITROSTAT) 0.4 MG sublingual tablet     vitamin D3 (CHOLECALCIFEROL) 1000 units (25 mcg) tablet     glucose (BD GLUCOSE) 4 g " chewable tablet     glucose (BD GLUCOSE) 5 g chewable tablet     No current facility-administered medications for this visit.        Social History     Tobacco Use     Smoking status: Never Smoker     Smokeless tobacco: Former User     Types: Snuff   Substance Use Topics     Alcohol use: No     Drug use: No       Honoring Choices - Health Care Directive Guide offered to patient at time of visit.    Health Maintenance Due   Topic Date Due     HEPATITIS C SCREENING  1960     HIV SCREENING  01/01/1973     HEPATITIS B IMMUNIZATION (1 of 3 - Risk 3-dose series) 01/01/1977     ZOSTER IMMUNIZATION (1 of 2) 01/01/2008     PREVENTIVE CARE VISIT  03/12/2014     DIABETIC FOOT EXAM  06/12/2015     EYE EXAM  06/20/2015     ADVANCE CARE PLANNING  03/12/2018     PHQ-2  01/01/2020     LIPID  03/12/2020     BMP  06/11/2020     INFLUENZA VACCINE (1) 09/01/2020     MICROALBUMIN  09/10/2020       Immunization History   Administered Date(s) Administered     DTAP (<7y) 04/02/2003, 05/13/2003     DTaP, Unspecified 12/23/2011     FLU 6-35 months 12/09/2004     Flu, Unspecified 12/09/2004     Influenza (IIV3) PF 12/23/2011     Influenza Vaccine IM > 6 months Valent IIV4 12/04/2018, 11/01/2019     MMR 04/02/2003, 05/13/2003     Pneumococcal 23 valent 12/23/2011     TDAP Vaccine (Adacel) 12/23/2011     Td (Adult), Adsorbed 04/02/2003, 05/13/2003     Tdap (Adult) Unspecified Formulation 12/23/2011       No results found for: PAP      Recent Labs   Lab Test 05/07/20  1428 12/10/19  1327 09/10/19  1355  06/11/19  1300  03/12/19  1157  11/20/18  1426 06/06/14  0945 02/18/14  0826 01/08/14  1521   A1C 7.0* 7.1* 8.0*   < >  --    < >  --    < >  --  8.8* 9.1*  --    LDL  --   --   --   --   --   --  90  --   --  174* 110  --    HDL  --   --   --   --   --   --  38*  --   --  31* 35*  --    TRIG  --   --   --   --   --   --  102  --   --  247* 192*  --    ALT  --   --   --   --   --   --   --   --  24 28  --  30   CR  --   --   --   --  0.99   --   --   --  0.88 0.83  --  0.88   GFRESTIMATED  --   --   --   --  81  --   --   --  88 >90  --  90   GFRESTBLACK  --   --   --   --  >90  --   --   --  >90 >90  --  >90   ALBUMIN  --   --   --   --   --   --   --   --  4.3 4.1  --  4.1   POTASSIUM  --   --   --   --  4.3  --   --   --  4.5 4.4  --  4.3   TSH  --   --   --   --  1.35  --   --   --  1.83  --   --  1.83    < > = values in this interval not displayed.       PHQ-2 ( 1999 Pfizer) 6/11/2019 12/4/2018   Q1: Little interest or pleasure in doing things 2 0   Q2: Feeling down, depressed or hopeless 1 0   PHQ-2 Score 3 0       PHQ-9 SCORE 11/20/2018 6/11/2019   PHQ-9 Total Score 4 8       SAURAV-7 SCORE 11/20/2018 6/11/2019   Total Score 6 5       No flowsheet data found.      Jolene Savage, [facility]  September 22, 2020 1:07 PM

## 2020-09-22 NOTE — PROGRESS NOTES
HPI       Phani GARCIA Jumana Jeronimo is a 60 year old  who presents for   Chief Complaint   Patient presents with     Diabetes     Phani is a 60 year old male who presents for a diabetes, hyperlipidemia, and hypertension follow-up. Patient reports elevated blood glucoses during a recent period of about 1 month when patient was unable to fill Victoza prescription. Patient also presents with new bilateral shin rash. Patient states this rash was itchy. He states he has been placing vaseline on rash and it appears to be healing. Reports history of painful muscle aching in bilateral lower extremities that has improved on Gabapentin. Patient reports no recent chest pain, breathing difficulties, or change in vision.     Diabetes Follow-up  <8.0  Patient is checking blood sugars: twice daily.    Blood sugar testing frequency justification: On insulin, frequency appropriate   Results are as follows:         Test blood sugar 1-2 times daily         -Last A1C was   Lab Results   Component Value Date    A1C 7.0 05/07/2020    A1C 7.1 12/10/2019    A1C 8.0 09/10/2019    A1C 8.5 06/11/2019    A1C 7.7 03/12/2019        Diabetic concerns: None and blood sugar frequently over 200 when unable to fill Victoza prescription for about 1 month    Chest Pain or exercise related calf pain (claudication):no     Symptoms of hypoglycemia (low blood sugar): none     Paresthesias (numbness or burning in feet) or sores: Yes previous pain in bilateral feet, presents today with healing sores on bilateral shins     Diabetic eye exam within the last year?: No, patient stated he will schedule tomorrow    Hyperlipidemia Follow-Up      Recommended Level of Therapy:High Intensity Statin (atorvastatin 40*-80 mg or rosuvastatin 20-40mg)    Taking statin?  Yes, muscle aches, possibly from other cause    Other lipid medications/supplements?:  none  Lab Results   Component Value Date    CHOL 148 03/12/2019     Lab Results   Component Value Date    HDL 38  03/12/2019     Lab Results   Component Value Date    LDL 90 03/12/2019     Lab Results   Component Value Date    TRIG 102 03/12/2019     Lab Results   Component Value Date    CHOLHDLRATIO 8.1 06/06/2014       Hypertension Follow-up     <130/80    Outpatient blood pressures are not being checked.    Did patient take their HTN pills today?  Yes  Last Basic Metabolic Panel:  Lab Results   Component Value Date     06/11/2019      Lab Results   Component Value Date    POTASSIUM 4.3 06/11/2019     Lab Results   Component Value Date    CHLORIDE 109 06/11/2019     Lab Results   Component Value Date    LIU 9.6 06/11/2019     Lab Results   Component Value Date    CO2 22 06/11/2019     Lab Results   Component Value Date    BUN 20 06/11/2019     Lab Results   Component Value Date    CR 0.99 06/11/2019     Lab Results   Component Value Date     06/11/2019         Exercise  Exercise:walking, Patient reports decreased physical activity         Problem, Medication and Allergy Lists were      Patient Active Problem List    Diagnosis Date Noted     Vitamin D deficiency 01/08/2019     Priority: Medium     Type 2 diabetes mellitus without complication (H) 10/15/2015     Priority: Medium     Pain in joint, shoulder region 03/11/2014     Priority: Medium     Impingement syndrome, shoulder 10/28/2013     Priority: Medium     Advanced directives, counseling/discussion 03/12/2013     Priority: Medium     Advance Care Planning:   ACP Review and Resources Provided:  Reviewed chart for advance care plan.  Chapo Cowan has no plan or code status on file. Discussed available resources and provided with information. Confirmed code status reflects current choices pending further ACP discussions.  Confirmed/documented designated decision maker(s). See permanent comments section of demographics in clinical tab.   Added by Eduar Shaw on 3/12/2013           Type 2 diabetes, HbA1C goal < 8% (H) 04/26/2012     Priority: Medium      CARDIOVASCULAR SCREENING; LDL GOAL LESS THAN 100 12/23/2011     Priority: Medium     Hypertension goal BP (blood pressure) < 130/80 12/23/2011     Priority: Medium     Hypercholesteremia 12/10/2010     Priority: Medium   ,     Current Outpatient Medications   Medication Sig Dispense Refill     amLODIPine (NORVASC) 10 MG tablet Take 1 tablet (10 mg) by mouth daily 90 tablet 1     aspirin (ASA) 81 MG chewable tablet Take 1 tablet (81 mg) by mouth daily 100 tablet 2     atorvastatin (LIPITOR) 40 MG tablet Take 1 tablet (40 mg) by mouth daily 90 tablet 1     blood glucose (NO BRAND SPECIFIED) test strip Test twice daily. 2 Box 4     blood glucose (NO BRAND SPECIFIED) test strip Use to test blood sugar 2 times daily or as directed. 100 each 1     blood glucose monitoring (ACCU-CHEK COMPACT CARE KIT) meter device kit Use to test blood sugars 1-2 times daily or as directed. 1 kit 0     blood glucose monitoring (ACCU-CHEK MULTICLIX) lancets Use to test blood sugar 1-2 times daily or as directed. 102 each prn     Blood Pressure Monitoring (PREMIUM AUTOMATIC BP MONITOR) CHIARA 1 Device daily 1 each 0     famotidine (PEPCID) 20 MG tablet Take 1 tablet (20 mg) by mouth 2 times daily 60 tablet 11     gabapentin (NEURONTIN) 100 MG capsule Take 1 capsule (100 mg) by mouth 3 times daily 90 capsule 1     insulin glargine (LANTUS PEN) 100 UNIT/ML pen Inject 20 Units Subcutaneous 2 times daily 15 mL 2     insulin pen needle (31G X 8 MM) 31G X 8 MM miscellaneous Use to inject insulin twice daily and Victoza once daily 100 each 11     liraglutide (VICTOZA) 18 MG/3ML solution Inject 1.2 mg Subcutaneous daily 6 mL 0     lisinopril (ZESTRIL) 40 MG tablet Take 1 tablet (40 mg) by mouth daily 90 tablet 1     metFORMIN (GLUCOPHAGE) 500 MG tablet Take 2 tablets (1,000 mg) by mouth 2 times daily (with meals) 120 tablet 1     metoprolol tartrate (LOPRESSOR) 100 MG tablet Take 1 tablet (100 mg) by mouth 2 times daily 60 tablet 5     nitroGLYcerin  "(NITROSTAT) 0.4 MG sublingual tablet Place 1 tablet (0.4 mg) under the tongue every 5 minutes as needed for chest pain for 3 doses. If symptoms persist 5 minutes after 1st dose call 911. 25 tablet 0     vitamin D3 (CHOLECALCIFEROL) 1000 units (25 mcg) tablet Take 1 tablet (1,000 Units) by mouth daily 100 tablet 3     glucose (BD GLUCOSE) 4 g chewable tablet Take 2 tablets by mouth as needed (for blood sugar less than 70.) (Patient not taking: Reported on 9/22/2020) 20 tablet 6     glucose (BD GLUCOSE) 5 g chewable tablet Take 2 tablets (10 g) by mouth as needed (prn blood sugar < 70) (Patient not taking: Reported on 9/22/2020) 15 tablet 0   ,     Allergies   Allergen Reactions     Latex Rash     Latex    .    Patient is an established patient of this clinic..         Review of Systems:   Review of Systems   GEN: no acute distress  HEENT: EYES: sclera clear  CARDIAC: patient reports chest pain is gone, no recent nitroglycerin use  RESP: no increased work of breathing, cough or shortness of breath             Physical Exam:     Vitals:    09/22/20 1305   BP: 137/69   Pulse: 76   Temp: 98.2  F (36.8  C)   TempSrc: Oral   SpO2: 95%   Weight: 80.5 kg (177 lb 6.4 oz)   Height: 1.732 m (5' 8.2\")     Body mass index is 26.82 kg/m .  Vitals were reviewed and were normal  Vital signs normal except elevated blood pressure     Physical Exam  MENTAL STATUS EXAM  Appearance: appropriate  Attitude: cooperative  Behavior: normal  Eye Contact: normal  Speech: normal  Orientation: oriented to person, place, time and situation  Mood: normal  Affect: Congruent with mood  Thought Process: appropriate  Suicidal Ideation: reports no suicidal ideation  Hallucination: denies    RESP: clear to auscultation, good aeration throughout, no accessory muscle use.  CARDIAC: regular rate and rhythm. Peripheral pulses 2+, extremities warm. No peripheral edema noted.   SKIN: rash noted on bilateral shins with healing open sores. Evidence of " scratching. Monofilament testing performed with adequate sensation noted bilaterally.     Results:   Results are ordered and pending    Assessment and Plan        Phani was seen today for diabetes.    Diagnoses and all orders for this visit:    Type 2 diabetes mellitus without complication, with long-term current use of insulin (H)  -     Hemoglobin A1c (AP [facility])  -     Comprehensive metabolic panel  -     CBC with platelets  -     Albumin Random Urine Quantitative with Creat Ratio    Benign essential hypertension  -     Comprehensive metabolic panel  -     CBC with platelets    Muscle cramps  -     Magnesium    Lipid screening  -     Lipid panel reflex to direct LDL Fasting         There are no discontinued medications.     Continue current medications. Continue to watch diet and get regular exercise.  Complete blood count, comprehensive metabolic profile, urine albumin, Hgb A1C.  Schedule diabetic eye exam and recommend dentist every year.  Already received flu vaccine  B12 and vitamin D were checked in May 2020 and were within normal.    Monitor rash. Elevate legs at rest, control blood sugar, vaseline OK.  Return to clinic if it worsens or you develop swelling.    Options for treatment and follow-up care were reviewed with the patient. Phani Jeronimo  engaged in the decision making process and verbalized understanding of the options discussed and agreed with the final plan.    Leah Fodr, RICARDO CNP

## 2020-09-22 NOTE — RESULT ENCOUNTER NOTE
Letter sent re: elevated A1c likely due to being off Victoza for more than 1 month. Recheck in 3 months. Continue current meds. Leah SILVA CNP

## 2020-10-08 DIAGNOSIS — G62.9 PERIPHERAL POLYNEUROPATHY: ICD-10-CM

## 2020-10-10 DIAGNOSIS — E55.9 VITAMIN D DEFICIENCY: Primary | ICD-10-CM

## 2020-10-13 RX ORDER — VITAMIN B COMPLEX
1 TABLET ORAL DAILY
Qty: 100 TABLET | Refills: 3 | Status: SHIPPED | OUTPATIENT
Start: 2020-10-13 | End: 2021-01-01

## 2020-10-13 RX ORDER — GABAPENTIN 100 MG/1
100 CAPSULE ORAL 3 TIMES DAILY
Qty: 90 CAPSULE | Refills: 1 | Status: SHIPPED | OUTPATIENT
Start: 2020-10-13 | End: 2020-11-21

## 2020-10-13 NOTE — TELEPHONE ENCOUNTER
vitamin D3 (CHOLECALCIFEROL) 1000 units (25 mcg) tablet  Take 1 tablet (1,000 Units) by mouth daily       Last Written Prescription Date:  9/10/19  Last Fill Quantity: 100,   # refills: 3  Last Office Visit : 9/22/20  Future Office visit: none    Refill to pharmacy.

## 2020-10-13 NOTE — TELEPHONE ENCOUNTER
gabapentin (NEURONTIN) 100 MG capsule  Last Written Prescription Date:  8/13/20  Last Fill Quantity: 90,   # refills: 1  Last Office Visit : 9/22/20  Future Office visit: none    Routing refill request to provider for review/approval because:not on protocol

## 2020-11-15 DIAGNOSIS — I25.9 CHEST PAIN DUE TO MYOCARDIAL ISCHEMIA, UNSPECIFIED ISCHEMIC CHEST PAIN TYPE: ICD-10-CM

## 2020-11-17 DIAGNOSIS — E11.9 TYPE 2 DIABETES MELLITUS WITHOUT COMPLICATION, WITH LONG-TERM CURRENT USE OF INSULIN (H): ICD-10-CM

## 2020-11-17 DIAGNOSIS — I10 BENIGN ESSENTIAL HYPERTENSION: ICD-10-CM

## 2020-11-17 DIAGNOSIS — Z79.4 TYPE 2 DIABETES MELLITUS WITHOUT COMPLICATION, WITH LONG-TERM CURRENT USE OF INSULIN (H): ICD-10-CM

## 2020-11-17 RX ORDER — ASPIRIN 81 MG/1
81 TABLET, CHEWABLE ORAL DAILY
Qty: 100 TABLET | Refills: 2 | Status: SHIPPED | OUTPATIENT
Start: 2020-11-17 | End: 2021-01-01

## 2020-11-17 RX ORDER — LIRAGLUTIDE 6 MG/ML
1.2 INJECTION SUBCUTANEOUS DAILY
Qty: 6 ML | Refills: 0 | Status: CANCELLED | OUTPATIENT
Start: 2020-11-17

## 2020-11-17 RX ORDER — NITROGLYCERIN 0.4 MG/1
TABLET SUBLINGUAL
Qty: 25 TABLET | Refills: 0 | Status: SHIPPED | OUTPATIENT
Start: 2020-11-17 | End: 2021-01-01

## 2020-11-17 RX ORDER — LIRAGLUTIDE 6 MG/ML
1.2 INJECTION SUBCUTANEOUS DAILY
Qty: 6 ML | Refills: 2 | Status: SHIPPED | OUTPATIENT
Start: 2020-11-17 | End: 2021-01-01

## 2020-11-17 NOTE — TELEPHONE ENCOUNTER
Received telephone call from patient. He is out of liraglutide again. Refilled. Also reports he had an episode of chest pain 2 weeks ago and had to take 2 ntg tablets about 7 minutes apart for relief. Also felt short of breath at the time and described pain as pressure in chest. Has not had any episodes since. He has been walking and doing usual activities without any further symptoms. Requested follow up appointment in clinic. Needs EKG and stress test.  Instructed to seek care if this recurs. Requesting refill of NTG and aspirin. These were refilled along with liraglutide. Leah SILVA CNP

## 2020-11-24 ENCOUNTER — TELEPHONE (OUTPATIENT)
Dept: FAMILY MEDICINE | Facility: CLINIC | Age: 60
End: 2020-11-24

## 2020-11-24 NOTE — TELEPHONE ENCOUNTER
Spoke with patient on the phone.  He has COVID symptoms including headache and cough but no fever. He cancelled today's appointment and [facility] but he states he was unable to access that. Can order it through system, but informed it might take 7 days to get scheduled. Directed him to [facility] or [facility]. He agrees to try those. He has not experienced any more chest pain episodes and otherwise feels good. Reviewed if chest pain becomes more frequent or does not resolve, he should seek care in ED. He will need stress test, which we will reschedule after he is well. Leah SILVA CNP

## 2020-12-02 NOTE — TELEPHONE ENCOUNTER
Patient is calling to let the provider know the results of his covid test. He states it was negative.     Jolene Savage, CMA

## 2020-12-10 ENCOUNTER — TRANSFERRED RECORDS (OUTPATIENT)
Dept: HEALTH INFORMATION MANAGEMENT | Facility: CLINIC | Age: 60
End: 2020-12-10

## 2020-12-15 ENCOUNTER — OFFICE VISIT (OUTPATIENT)
Dept: FAMILY MEDICINE | Facility: CLINIC | Age: 60
End: 2020-12-15
Payer: COMMERCIAL

## 2020-12-15 VITALS
BODY MASS INDEX: 27.07 KG/M2 | WEIGHT: 178.6 LBS | OXYGEN SATURATION: 95 % | SYSTOLIC BLOOD PRESSURE: 147 MMHG | TEMPERATURE: 97.4 F | HEART RATE: 80 BPM | HEIGHT: 68 IN | DIASTOLIC BLOOD PRESSURE: 75 MMHG

## 2020-12-15 DIAGNOSIS — E11.649 TYPE 2 DIABETES MELLITUS WITH HYPOGLYCEMIA WITHOUT COMA, WITH LONG-TERM CURRENT USE OF INSULIN (H): ICD-10-CM

## 2020-12-15 DIAGNOSIS — L03.011 PARONYCHIA OF RIGHT THUMB: ICD-10-CM

## 2020-12-15 DIAGNOSIS — Z79.4 TYPE 2 DIABETES MELLITUS WITH DIABETIC POLYNEUROPATHY, WITH LONG-TERM CURRENT USE OF INSULIN (H): ICD-10-CM

## 2020-12-15 DIAGNOSIS — Z79.4 TYPE 2 DIABETES MELLITUS WITH HYPOGLYCEMIA WITHOUT COMA, WITH LONG-TERM CURRENT USE OF INSULIN (H): ICD-10-CM

## 2020-12-15 DIAGNOSIS — R07.9 CHEST PAIN, UNSPECIFIED TYPE: Primary | ICD-10-CM

## 2020-12-15 DIAGNOSIS — E11.42 TYPE 2 DIABETES MELLITUS WITH DIABETIC POLYNEUROPATHY, WITH LONG-TERM CURRENT USE OF INSULIN (H): ICD-10-CM

## 2020-12-15 DIAGNOSIS — E78.5 HYPERLIPIDEMIA LDL GOAL <100: ICD-10-CM

## 2020-12-15 LAB — HBA1C MFR BLD: 7.4 % (ref 4.1–5.7)

## 2020-12-15 RX ORDER — ATORVASTATIN CALCIUM 40 MG/1
40 TABLET, FILM COATED ORAL DAILY
Qty: 90 TABLET | Refills: 1 | Status: SHIPPED | OUTPATIENT
Start: 2020-12-15 | End: 2021-01-01

## 2020-12-15 ASSESSMENT — MIFFLIN-ST. JEOR: SCORE: 1581.45

## 2020-12-15 NOTE — NURSING NOTE
"60 year old  Chief Complaint   Patient presents with     Diabetes       Blood pressure (!) 147/75, pulse 80, temperature 97.4  F (36.3  C), temperature source Oral, height 1.722 m (5' 7.8\"), weight 81 kg (178 lb 9.6 oz), SpO2 95 %. Body mass index is 27.32 kg/m .  BP completed using cuff size:    Patient Active Problem List   Diagnosis     Hypercholesteremia     CARDIOVASCULAR SCREENING; LDL GOAL LESS THAN 100     Hypertension goal BP (blood pressure) < 130/80     Type 2 diabetes, HbA1C goal < 8% (H)     Advanced directives, counseling/discussion     Impingement syndrome, shoulder     Pain in joint, shoulder region     Type 2 diabetes mellitus without complication (H)     Vitamin D deficiency       Wt Readings from Last 2 Encounters:   12/15/20 81 kg (178 lb 9.6 oz)   09/22/20 80.5 kg (177 lb 6.4 oz)     BP Readings from Last 3 Encounters:   12/15/20 (!) 147/75   09/22/20 137/69   07/16/20 125/74       Allergies   Allergen Reactions     Latex Rash     Latex        Current Outpatient Medications   Medication     amLODIPine (NORVASC) 10 MG tablet     aspirin (ASA) 81 MG chewable tablet     atorvastatin (LIPITOR) 40 MG tablet     blood glucose (NO BRAND SPECIFIED) test strip     blood glucose (NO BRAND SPECIFIED) test strip     blood glucose monitoring (ACCU-CHEK COMPACT CARE KIT) meter device kit     blood glucose monitoring (ACCU-CHEK MULTICLIX) lancets     famotidine (PEPCID) 20 MG tablet     gabapentin (NEURONTIN) 100 MG capsule     insulin glargine (LANTUS PEN) 100 UNIT/ML pen     insulin pen needle (31G X 8 MM) 31G X 8 MM miscellaneous     liraglutide (VICTOZA) 18 MG/3ML solution     lisinopril (ZESTRIL) 40 MG tablet     metFORMIN (GLUCOPHAGE) 500 MG tablet     metoprolol tartrate (LOPRESSOR) 100 MG tablet     nitroGLYcerin (NITROSTAT) 0.4 MG sublingual tablet     vitamin D3 (CHOLECALCIFEROL) 1000 units (25 mcg) tablet     Blood Pressure Monitoring (PREMIUM AUTOMATIC BP MONITOR) CHIARA     glucose (BD GLUCOSE) 4 g " chewable tablet     glucose (BD GLUCOSE) 5 g chewable tablet     Vitamin D3 (CHOLECALCIFEROL) 25 mcg (1000 units) tablet     No current facility-administered medications for this visit.        Social History     Tobacco Use     Smoking status: Never Smoker     Smokeless tobacco: Former User     Types: Snuff   Substance Use Topics     Alcohol use: No     Drug use: No       Honoring Choices - Health Care Directive Guide offered to patient at time of visit.    Health Maintenance Due   Topic Date Due     HIV SCREENING  01/01/1973     HEPATITIS C SCREENING  01/01/1976     ZOSTER IMMUNIZATION (1 of 2) 01/01/2008     PREVENTIVE CARE VISIT  03/12/2014     EYE EXAM  06/20/2015     ADVANCE CARE PLANNING  03/12/2018     PHQ-2  01/01/2020     INFLUENZA VACCINE (1) 09/01/2020     COLORECTAL CANCER SCREENING  01/10/2021       Immunization History   Administered Date(s) Administered     DTAP (<7y) 04/02/2003, 05/13/2003     DTaP, Unspecified 12/23/2011     FLU 6-35 months 12/09/2004     Flu, Unspecified 12/09/2004     Influenza (IIV3) PF 12/23/2011     Influenza Vaccine IM > 6 months Valent IIV4 12/04/2018, 11/01/2019     MMR 04/02/2003, 05/13/2003     Pneumococcal 23 valent 12/23/2011     TDAP Vaccine (Adacel) 12/23/2011     Td (Adult), Adsorbed 04/02/2003, 05/13/2003     Tdap (Adult) Unspecified Formulation 12/23/2011       No results found for: PAP      Recent Labs   Lab Test 09/22/20  1400 09/22/20  1320 05/07/20  1428 12/10/19  1327 06/11/19  1300 06/11/19  1300 03/12/19  1157 03/12/19  1157 11/20/18  1426 11/20/18  1426 06/06/14  0945   A1C 9.1*  --  7.0* 7.1*   < >  --    < >  --    < >  --  8.8*   LDL  --  86  --   --   --   --   --  90  --   --  174*   HDL  --  37*  --   --   --   --   --  38*  --   --  31*   TRIG  --  207*  --   --   --   --   --  102  --   --  247*   ALT  --  19  --   --   --   --   --   --   --  24 28   CR  --  0.99  --   --   --  0.99  --   --   --  0.88 0.83   GFRESTIMATED  --  81  --   --   --  81   --   --   --  88 >90   GFRESTBLACK  --  >90  --   --   --  >90  --   --   --  >90 >90   ALBUMIN  --  4.0  --   --   --   --   --   --   --  4.3 4.1   POTASSIUM  --  4.3  --   --   --  4.3  --   --   --  4.5 4.4   TSH  --   --   --   --   --  1.35  --   --   --  1.83  --     < > = values in this interval not displayed.       PHQ-2 ( 1999 Pfizer) 6/11/2019 12/4/2018   Q1: Little interest or pleasure in doing things 2 0   Q2: Feeling down, depressed or hopeless 1 0   PHQ-2 Score 3 0       PHQ-9 SCORE 11/20/2018 6/11/2019   PHQ-9 Total Score 4 8       SAURAV-7 SCORE 11/20/2018 6/11/2019   Total Score 6 5       No flowsheet data found.      Jolene Savage, [facility]  December 15, 2020 1:24 PM

## 2020-12-15 NOTE — PROGRESS NOTES
QUINN GARCIA Jumana Jeronimo is a 60 year old  who presents for   Chief Complaint   Patient presents with     Diabetes   60 year-old male presents to clinic for follow up of DM type 2 and recent episode of chest pain  1. Diabetes: BS running .  This am was 106. Occasionally feels shaky and fatigued with low BS. Is taking Victoza 1.2mg  regularly again. Ran out again in November. This is when he experienced chest pain. He increased his Lantus insulin to 25 units after high A1c in September. 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 A1c 2020 was 9.1. Up from previous but he was out of Victoza for some time at that point.   2. Chest pain: experienced episode of chest pain on 2020 while walking up the stairs.  Describes as left sided sharp pain that radiated into neck and arm. Took 2 NTG tablets to relieve.  Has not experienced any episodes since then. Did feel a little SOB with this.   3. Peripheral neuropathy. Much improved since started Gabapentin. This has worked well to relieve shooting pains that radiate from toes up lateral leg.  He sometimes experiences foot cramps also.       Problem, Medication and Allergy Lists were reviewed and updated if needed..    Patient is an established patient of this clinic.   Past Medical History:   Diagnosis Date     Albuminuria      Diabetes mellitus (H)      GERD (gastroesophageal reflux disease)      Hypercholesteremia      Hypertension      Neuropathy     lower extremities and feet     Nonproliferative retinopathy due to secondary diabetes (H)      Rash        Past Surgical History:   Procedure Laterality Date     cataract repair Bilateral        Family History   Problem Relation Age of Onset     Diabetes Mother          late 50's     Diabetes Father          54, Heart attack     Eye Disorder Father      Heart Disease Father      Heart Disease Paternal Uncle   "      Social History     Tobacco Use     Smoking status: Never Smoker     Smokeless tobacco: Former User     Types: Snuff   Substance Use Topics     Alcohol use: No                Review of Systems:   Review of Systems   GEN: denies weight change, fatigue  HEENT: had mild COVID-like symptoms in November but tested negative. OK Now.  CV: episode of chest pain as per HPI  RESP: negative SOB.   ENDO: as per HPI  NEURO: neuropathy improved as per HPI         Physical Exam:     Vitals:    12/15/20 1322   BP: (!) 147/75   Pulse: 80   Temp: 97.4  F (36.3  C)   TempSrc: Oral   SpO2: 95%   Weight: 81 kg (178 lb 9.6 oz)   Height: 1.722 m (5' 7.8\")     Body mass index is 27.32 kg/m .  Vital signs normal except BP elevated. Reports eating salty food today.      Physical Exam  GEN: alert male in no acute distress. Comfortable, pleasant  CV: HRRR, S1, S2, no MRG.  EKG show q wave lead III which was visible on previous but more pronounced.  Flat t in lead I. No ST elevation.   RESP: lungs clear to auscultation  Extremities: feet warm, pedal pulses +1 bilaterally, pink.  Cap refill 3 seconds.  NEURO: sensation intact to monofilament bilateral feet    Results:      Results from this visit  Results for orders placed or performed in visit on 12/15/20   Hemoglobin A1c ([facility])     Status: Abnormal   Result Value Ref Range    Hemoglobin A1C 7.4 (H) 4.1 - 5.7 %       Assessment and Plan      ERIN Jeronimo was seen today for diabetes.    Diagnoses and all orders for this visit:    Chest pain, unspecified type  -     EKG 12-lead complete w/read - Clinics  -     Echo Stress Echocardiogram; Future  -     CARDIOLOGY EVAL ADULT REFERRAL; Future    Hyperlipidemia LDL goal <100  -     atorvastatin (LIPITOR) 40 MG tablet; Take 1 tablet (40 mg) by mouth daily    Type 2 diabetes mellitus with diabetic polyneuropathy, with long-term current use of insulin (H)  -     Hemoglobin A1c ([facility])    Type 2 diabetes mellitus with " hypoglycemia without coma, with long-term current use of insulin (H)    In view of risk factors, recent episode of chest pain and subtle changes in 12 lead EKG, obtain stress echo and refer to cardiology. To seek care if recurrence or persistence. To carry NTG with him.   Stress echo scheduled for 12/17 at 11am. Reviewed prep: NPO 3 hours prior to procedure.  Hold Metoprolol am of procedure.  Eat light meal at least 4 hours before procedure and reduce Lantus and Victoza in am, hold Metformin. Not to have caffeine within 12 hours. Patient verbalizes understanding. He states he will hold all meds am of procedure but requested he not do this as BP might be quite high as well as BS.   Request to pharmacist, Sung Aldridge to contact patient to review medications. Asked to reduce Lantus back to 22 units for now as BS as low as 75. A1c down to 7.4   Intended to recheck BP before patient left, but he left.    Follow up after cardiology work up.          There are no discontinued medications.    Options for treatment and follow-up care were reviewed with the patient. Phani Jeronimo  engaged in the decision making process and verbalized understanding of the options discussed and agreed with the final plan.    RICARDO De Luna CNP  S. As patient was exiting clinic, showed provider his right thumb which he reported was slightly swollen and painful for past few days.  He denied fever or drainage and had not injured it.   O. There was erythema, scant bloody dried drainage and mild edema along the medial nail line of the right thumb with surrounding erythema and a visible tender purulent collection on the thumb pad.   A. Paronychia right thumb  P. Patient was instructed to soak the thumb at least twice daily for 10 minutes and keflex was prescribed to treat the infection.

## 2020-12-15 NOTE — PATIENT INSTRUCTIONS
Diabetes Type 2  Continue Victoza at 1.2 mg daily  Reduce Lantus to 22 Units twice daily. Will ask pharmacist, Sung, to call you to review medications.   Goal Blood Sugar range 100-130  Goal A1c 7.0 Today your A1c is 7.4  A1C today:  Plan to recheck urine microalbumin in March with next check up  Continue to eat healthy and exercise regularly  Make sure you drink around 6 cups of water per day    Chest pain  12 lead EKG: Subtle changes. Referred to Cardiology  Stress echo given recent chest pain and risk factors: reviewed nothing by mouth 3 hours before procedure; hold metoprolol in am of procedure, eat light am meal 4 h before procedure and reduce am dose of Lantus and Victoza.

## 2020-12-16 RX ORDER — CEPHALEXIN 500 MG/1
500 CAPSULE ORAL 2 TIMES DAILY
Qty: 14 CAPSULE | Refills: 0 | Status: SHIPPED | OUTPATIENT
Start: 2020-12-16 | End: 2020-12-23

## 2020-12-17 ENCOUNTER — TELEPHONE (OUTPATIENT)
Dept: PHARMACY | Facility: CLINIC | Age: 60
End: 2020-12-17

## 2020-12-17 ENCOUNTER — HOSPITAL ENCOUNTER (OUTPATIENT)
Dept: CARDIOLOGY | Facility: CLINIC | Age: 60
Discharge: HOME | End: 2020-12-17
Attending: NURSE PRACTITIONER | Admitting: NURSE PRACTITIONER
Payer: COMMERCIAL

## 2020-12-17 PROCEDURE — 93350 STRESS TTE ONLY: CPT | Mod: 26 | Performed by: INTERNAL MEDICINE

## 2020-12-17 PROCEDURE — 93018 CV STRESS TEST I&R ONLY: CPT | Performed by: INTERNAL MEDICINE

## 2020-12-17 PROCEDURE — 255N000002 HC RX 255 OP 636: Performed by: NURSE PRACTITIONER

## 2020-12-17 PROCEDURE — 93016 CV STRESS TEST SUPVJ ONLY: CPT | Performed by: INTERNAL MEDICINE

## 2020-12-17 PROCEDURE — 999N000208 ECHO STRESS ECHOCARDIOGRAM

## 2020-12-17 PROCEDURE — 93325 DOPPLER ECHO COLOR FLOW MAPG: CPT | Mod: 26 | Performed by: INTERNAL MEDICINE

## 2020-12-17 PROCEDURE — 93321 DOPPLER ECHO F-UP/LMTD STD: CPT | Mod: 26 | Performed by: INTERNAL MEDICINE

## 2020-12-17 RX ADMIN — PERFLUTREN 5 ML: 6.52 INJECTION, SUSPENSION INTRAVENOUS at 13:11

## 2020-12-17 NOTE — TELEPHONE ENCOUNTER
Called patient this morning to follow up with patient per staff message from Leah Ford NP.    Patient did eat a light meal this morning around 7:30 per her instructions. He will be having a stress echo today at 11am and needs to be NPO 3 hours prior per Leah. He did not take his metoprolol as instructed. Did take his metformin and Victoza this morning, but did decrease his Lantus to 22 units as he said he was supposed to take a lower dose this morning. Did note his BG was higher today than usual at 139. Believes it is likely due to eating some bananas yesterday. Reviewed signs and symptoms of hypoglycemia with the patient and what to do if they occur. Patient expressed understanding. Excited that his appointment is only 5 minutes from his house. Agreed with patient that was very convenient and wished him well on his procedure this morning. Encouraged him to call with questions and patient was agreeable.    Sung Aldridge, PharmD

## 2021-01-01 ENCOUNTER — TELEPHONE (OUTPATIENT)
Dept: FAMILY MEDICINE | Facility: CLINIC | Age: 61
End: 2021-01-01

## 2021-01-01 ENCOUNTER — IMMUNIZATION (OUTPATIENT)
Dept: NURSING | Facility: CLINIC | Age: 61
End: 2021-01-01
Payer: COMMERCIAL

## 2021-01-01 ENCOUNTER — TELEPHONE (OUTPATIENT)
Dept: PHARMACY | Facility: CLINIC | Age: 61
End: 2021-01-01

## 2021-01-01 ENCOUNTER — VIRTUAL VISIT (OUTPATIENT)
Dept: PHARMACY | Facility: CLINIC | Age: 61
End: 2021-01-01
Payer: COMMERCIAL

## 2021-01-01 ENCOUNTER — OFFICE VISIT (OUTPATIENT)
Dept: OPHTHALMOLOGY | Facility: CLINIC | Age: 61
End: 2021-01-01
Attending: OPHTHALMOLOGY
Payer: COMMERCIAL

## 2021-01-01 ENCOUNTER — OFFICE VISIT (OUTPATIENT)
Dept: PHARMACY | Facility: CLINIC | Age: 61
End: 2021-01-01
Payer: COMMERCIAL

## 2021-01-01 ENCOUNTER — OFFICE VISIT (OUTPATIENT)
Dept: FAMILY MEDICINE | Facility: CLINIC | Age: 61
End: 2021-01-01
Payer: COMMERCIAL

## 2021-01-01 ENCOUNTER — LAB (OUTPATIENT)
Dept: LAB | Facility: CLINIC | Age: 61
End: 2021-01-01
Payer: COMMERCIAL

## 2021-01-01 ENCOUNTER — IMMUNIZATION (OUTPATIENT)
Dept: NURSING | Facility: CLINIC | Age: 61
End: 2021-01-01
Attending: INTERNAL MEDICINE
Payer: COMMERCIAL

## 2021-01-01 VITALS
DIASTOLIC BLOOD PRESSURE: 72 MMHG | TEMPERATURE: 97.7 F | HEIGHT: 68 IN | HEART RATE: 88 BPM | BODY MASS INDEX: 25.63 KG/M2 | SYSTOLIC BLOOD PRESSURE: 137 MMHG | WEIGHT: 169.1 LBS | OXYGEN SATURATION: 93 %

## 2021-01-01 VITALS
OXYGEN SATURATION: 97 % | TEMPERATURE: 98.7 F | HEIGHT: 68 IN | DIASTOLIC BLOOD PRESSURE: 78 MMHG | SYSTOLIC BLOOD PRESSURE: 136 MMHG | HEART RATE: 82 BPM | BODY MASS INDEX: 26.39 KG/M2 | WEIGHT: 174.1 LBS

## 2021-01-01 VITALS
SYSTOLIC BLOOD PRESSURE: 117 MMHG | WEIGHT: 170 LBS | DIASTOLIC BLOOD PRESSURE: 70 MMHG | BODY MASS INDEX: 25.76 KG/M2 | OXYGEN SATURATION: 93 % | HEART RATE: 80 BPM | HEIGHT: 68 IN | TEMPERATURE: 98.5 F

## 2021-01-01 VITALS
TEMPERATURE: 97.3 F | HEIGHT: 68 IN | OXYGEN SATURATION: 96 % | DIASTOLIC BLOOD PRESSURE: 73 MMHG | BODY MASS INDEX: 25.73 KG/M2 | SYSTOLIC BLOOD PRESSURE: 116 MMHG | HEART RATE: 75 BPM | WEIGHT: 169.8 LBS

## 2021-01-01 DIAGNOSIS — E78.5 HYPERLIPIDEMIA LDL GOAL <100: ICD-10-CM

## 2021-01-01 DIAGNOSIS — E11.9 TYPE 2 DIABETES MELLITUS WITHOUT COMPLICATION, WITH LONG-TERM CURRENT USE OF INSULIN (H): ICD-10-CM

## 2021-01-01 DIAGNOSIS — R39.11 URINARY HESITANCY: ICD-10-CM

## 2021-01-01 DIAGNOSIS — I10 BENIGN ESSENTIAL HYPERTENSION: ICD-10-CM

## 2021-01-01 DIAGNOSIS — G62.9 PERIPHERAL POLYNEUROPATHY: ICD-10-CM

## 2021-01-01 DIAGNOSIS — Z79.4 TYPE 2 DIABETES MELLITUS WITHOUT COMPLICATION, WITH LONG-TERM CURRENT USE OF INSULIN (H): ICD-10-CM

## 2021-01-01 DIAGNOSIS — I25.9 CHEST PAIN DUE TO MYOCARDIAL ISCHEMIA, UNSPECIFIED ISCHEMIC CHEST PAIN TYPE: ICD-10-CM

## 2021-01-01 DIAGNOSIS — Z79.4 TYPE 2 DIABETES MELLITUS WITH HYPERGLYCEMIA, WITH LONG-TERM CURRENT USE OF INSULIN (H): ICD-10-CM

## 2021-01-01 DIAGNOSIS — E11.21 TYPE 2 DIABETES MELLITUS WITH DIABETIC NEPHROPATHY, WITH LONG-TERM CURRENT USE OF INSULIN (H): ICD-10-CM

## 2021-01-01 DIAGNOSIS — E11.9 TYPE 2 DIABETES MELLITUS WITHOUT COMPLICATION, WITH LONG-TERM CURRENT USE OF INSULIN (H): Primary | ICD-10-CM

## 2021-01-01 DIAGNOSIS — Z79.4 TYPE 2 DIABETES MELLITUS WITHOUT COMPLICATION, WITH LONG-TERM CURRENT USE OF INSULIN (H): Primary | ICD-10-CM

## 2021-01-01 DIAGNOSIS — E11.42 TYPE 2 DIABETES MELLITUS WITH DIABETIC POLYNEUROPATHY, WITH LONG-TERM CURRENT USE OF INSULIN (H): Primary | ICD-10-CM

## 2021-01-01 DIAGNOSIS — R25.2 MUSCLE CRAMPS: ICD-10-CM

## 2021-01-01 DIAGNOSIS — Z79.4 TYPE 2 DIABETES MELLITUS WITH HYPOGLYCEMIA WITHOUT COMA, WITH LONG-TERM CURRENT USE OF INSULIN (H): ICD-10-CM

## 2021-01-01 DIAGNOSIS — E11.649 TYPE 2 DIABETES MELLITUS WITH HYPOGLYCEMIA WITHOUT COMA, WITH LONG-TERM CURRENT USE OF INSULIN (H): Primary | ICD-10-CM

## 2021-01-01 DIAGNOSIS — R05.9 COUGH: Primary | ICD-10-CM

## 2021-01-01 DIAGNOSIS — R07.9 CHEST PAIN, UNSPECIFIED TYPE: ICD-10-CM

## 2021-01-01 DIAGNOSIS — K21.9 GASTROESOPHAGEAL REFLUX DISEASE WITHOUT ESOPHAGITIS: ICD-10-CM

## 2021-01-01 DIAGNOSIS — E11.65 TYPE 2 DIABETES MELLITUS WITH HYPERGLYCEMIA, WITH LONG-TERM CURRENT USE OF INSULIN (H): Primary | ICD-10-CM

## 2021-01-01 DIAGNOSIS — Z79.4 TYPE 2 DIABETES MELLITUS WITH DIABETIC NEPHROPATHY, WITH LONG-TERM CURRENT USE OF INSULIN (H): ICD-10-CM

## 2021-01-01 DIAGNOSIS — Z79.899 MEDICATION MANAGEMENT: ICD-10-CM

## 2021-01-01 DIAGNOSIS — E11.649 TYPE 2 DIABETES MELLITUS WITH HYPOGLYCEMIA WITHOUT COMA, WITH LONG-TERM CURRENT USE OF INSULIN (H): ICD-10-CM

## 2021-01-01 DIAGNOSIS — Z79.4 TYPE 2 DIABETES MELLITUS WITH DIABETIC POLYNEUROPATHY, WITH LONG-TERM CURRENT USE OF INSULIN (H): Primary | ICD-10-CM

## 2021-01-01 DIAGNOSIS — R68.89 TEMPERATURE INTOLERANCE: ICD-10-CM

## 2021-01-01 DIAGNOSIS — Z12.11 COLON CANCER SCREENING: ICD-10-CM

## 2021-01-01 DIAGNOSIS — E16.2 HYPOGLYCEMIA: Primary | ICD-10-CM

## 2021-01-01 DIAGNOSIS — E55.9 VITAMIN D DEFICIENCY: ICD-10-CM

## 2021-01-01 DIAGNOSIS — R19.7 INTERMITTENT DIARRHEA: ICD-10-CM

## 2021-01-01 DIAGNOSIS — Z79.4 TYPE 2 DIABETES MELLITUS WITH HYPERGLYCEMIA, WITH LONG-TERM CURRENT USE OF INSULIN (H): Primary | ICD-10-CM

## 2021-01-01 DIAGNOSIS — I10 HYPERTENSION GOAL BP (BLOOD PRESSURE) < 130/80: ICD-10-CM

## 2021-01-01 DIAGNOSIS — E11.29 TYPE 2 DIABETES MELLITUS WITH MICROALBUMINURIA, WITH LONG-TERM CURRENT USE OF INSULIN (H): Primary | ICD-10-CM

## 2021-01-01 DIAGNOSIS — R80.9 TYPE 2 DIABETES MELLITUS WITH MICROALBUMINURIA, WITH LONG-TERM CURRENT USE OF INSULIN (H): Primary | ICD-10-CM

## 2021-01-01 DIAGNOSIS — R12 HEARTBURN: ICD-10-CM

## 2021-01-01 DIAGNOSIS — R39.15 URINARY URGENCY: ICD-10-CM

## 2021-01-01 DIAGNOSIS — Z79.4 TYPE 2 DIABETES MELLITUS WITH HYPOGLYCEMIA WITHOUT COMA, WITH LONG-TERM CURRENT USE OF INSULIN (H): Primary | ICD-10-CM

## 2021-01-01 DIAGNOSIS — R12 HEARTBURN: Primary | ICD-10-CM

## 2021-01-01 DIAGNOSIS — E11.65 TYPE 2 DIABETES MELLITUS WITH HYPERGLYCEMIA, WITH LONG-TERM CURRENT USE OF INSULIN (H): ICD-10-CM

## 2021-01-01 DIAGNOSIS — Z79.4 TYPE 2 DIABETES MELLITUS WITH MICROALBUMINURIA, WITH LONG-TERM CURRENT USE OF INSULIN (H): Primary | ICD-10-CM

## 2021-01-01 DIAGNOSIS — E87.5 HYPERKALEMIA: ICD-10-CM

## 2021-01-01 DIAGNOSIS — E87.5 HYPERKALEMIA: Primary | ICD-10-CM

## 2021-01-01 DIAGNOSIS — R19.7 DIARRHEA, UNSPECIFIED TYPE: ICD-10-CM

## 2021-01-01 LAB
ALBUMIN UR-MCNC: 30 MG/DL
ANION GAP SERPL CALCULATED.3IONS-SCNC: 3 MMOL/L (ref 3–14)
ANION GAP SERPL CALCULATED.3IONS-SCNC: 7 MMOL/L (ref 3–14)
APPEARANCE UR: CLEAR
BILIRUB UR QL STRIP: ABNORMAL
BUN SERPL-MCNC: 16 MG/DL (ref 7–30)
BUN SERPL-MCNC: 23 MG/DL (ref 7–30)
CALCIUM SERPL-MCNC: 9.8 MG/DL (ref 8.5–10.1)
CALCIUM SERPL-MCNC: 9.8 MG/DL (ref 8.5–10.1)
CHLORIDE BLD-SCNC: 113 MMOL/L (ref 94–109)
CHLORIDE SERPL-SCNC: 109 MMOL/L (ref 94–109)
CHOLEST SERPL-MCNC: 142 MG/DL
CO2 SERPL-SCNC: 21 MMOL/L (ref 20–32)
CO2 SERPL-SCNC: 27 MMOL/L (ref 20–32)
COLOR UR AUTO: ABNORMAL
CREAT SERPL-MCNC: 1.02 MG/DL (ref 0.66–1.25)
CREAT SERPL-MCNC: 1.05 MG/DL (ref 0.66–1.25)
CREAT UR-MCNC: 196 MG/DL
CREAT UR-MCNC: 313 MG/DL
ERYTHROCYTE [DISTWIDTH] IN BLOOD BY AUTOMATED COUNT: 13.9 % (ref 10–15)
FASTING STATUS PATIENT QL REPORTED: ABNORMAL
GFR SERPL CREATININE-BSD FRML MDRD: 75 ML/MIN/{1.73_M2}
GFR SERPL CREATININE-BSD FRML MDRD: 78 ML/MIN/1.73M2
GLUCOSE BLD-MCNC: 150 MG/DL (ref 70–99)
GLUCOSE SERPL-MCNC: 185 MG/DL (ref 70–99)
GLUCOSE UR STRIP-MCNC: 250 MG/DL
H PYLORI AG STL QL IA: NEGATIVE
HBA1C MFR BLD: 7.6 % (ref 4.1–5.7)
HBA1C MFR BLD: 8.8 %
HCT VFR BLD AUTO: 43.8 % (ref 40–53)
HDLC SERPL-MCNC: 34 MG/DL
HGB BLD-MCNC: 14.3 G/DL (ref 13.3–17.7)
HGB UR QL STRIP: NEGATIVE
KETONES UR STRIP-MCNC: 15 MG/DL
LDLC SERPL CALC-MCNC: 90 MG/DL
LEUKOCYTE ESTERASE UR QL STRIP: NEGATIVE
MAGNESIUM SERPL-MCNC: 1.8 MG/DL (ref 1.6–2.3)
MCH RBC QN AUTO: 28.9 PG (ref 26.5–33)
MCHC RBC AUTO-ENTMCNC: 32.6 G/DL (ref 31.5–36.5)
MCV RBC AUTO: 89 FL (ref 78–100)
MICROALBUMIN UR-MCNC: 23 MG/L
MICROALBUMIN UR-MCNC: 26 MG/L
MICROALBUMIN/CREAT UR: 13.01 MG/G CR (ref 0–17)
MICROALBUMIN/CREAT UR: 7.35 MG/G CR (ref 0–17)
NITRATE UR QL: NEGATIVE
NONHDLC SERPL-MCNC: 108 MG/DL
PH UR STRIP: 5 [PH] (ref 5–7)
PLATELET # BLD AUTO: 320 10E9/L (ref 150–450)
POTASSIUM BLD-SCNC: 5 MMOL/L (ref 3.4–5.3)
POTASSIUM BLD-SCNC: 5.9 MMOL/L (ref 3.4–5.3)
POTASSIUM SERPL-SCNC: 4.6 MMOL/L (ref 3.4–5.3)
PSA SERPL-MCNC: 1.57 UG/L (ref 0–4)
RBC # BLD AUTO: 4.95 10E12/L (ref 4.4–5.9)
SODIUM SERPL-SCNC: 137 MMOL/L (ref 133–144)
SODIUM SERPL-SCNC: 143 MMOL/L (ref 133–144)
SP GR UR STRIP: 1.02 (ref 1–1.03)
TRIGL SERPL-MCNC: 88 MG/DL
TSH SERPL DL<=0.005 MIU/L-ACNC: 1.06 MU/L (ref 0.4–4)
UROBILINOGEN UR STRIP-ACNC: 1 E.U./DL
WBC # BLD AUTO: 10.8 10E9/L (ref 4–11)

## 2021-01-01 PROCEDURE — G0103 PSA SCREENING: HCPCS | Performed by: NURSE PRACTITIONER

## 2021-01-01 PROCEDURE — 92250 FUNDUS PHOTOGRAPHY W/I&R: CPT | Performed by: OPHTHALMOLOGY

## 2021-01-01 PROCEDURE — 91300 PR COVID VAC PFIZER DIL RECON 30 MCG/0.3 ML IM: CPT

## 2021-01-01 PROCEDURE — 92134 CPTRZ OPH DX IMG PST SGM RTA: CPT | Performed by: OPHTHALMOLOGY

## 2021-01-01 PROCEDURE — 80061 LIPID PANEL: CPT | Performed by: NURSE PRACTITIONER

## 2021-01-01 PROCEDURE — G0463 HOSPITAL OUTPT CLINIC VISIT: HCPCS

## 2021-01-01 PROCEDURE — 84443 ASSAY THYROID STIM HORMONE: CPT | Performed by: NURSE PRACTITIONER

## 2021-01-01 PROCEDURE — 0002A PR COVID VAC PFIZER DIL RECON 30 MCG/0.3 ML IM: CPT

## 2021-01-01 PROCEDURE — 92134 CPTRZ OPH DX IMG PST SGM RTA: CPT | Mod: 26 | Performed by: OPHTHALMOLOGY

## 2021-01-01 PROCEDURE — 92015 DETERMINE REFRACTIVE STATE: CPT

## 2021-01-01 PROCEDURE — 82043 UR ALBUMIN QUANTITATIVE: CPT | Performed by: NURSE PRACTITIONER

## 2021-01-01 PROCEDURE — 80048 BASIC METABOLIC PNL TOTAL CA: CPT | Performed by: NURSE PRACTITIONER

## 2021-01-01 PROCEDURE — 84132 ASSAY OF SERUM POTASSIUM: CPT | Performed by: NURSE PRACTITIONER

## 2021-01-01 PROCEDURE — 87338 HPYLORI STOOL AG IA: CPT | Performed by: NURSE PRACTITIONER

## 2021-01-01 PROCEDURE — 99203 OFFICE O/P NEW LOW 30 MIN: CPT | Mod: GC | Performed by: OPHTHALMOLOGY

## 2021-01-01 PROCEDURE — 0001A PR COVID VAC PFIZER DIL RECON 30 MCG/0.3 ML IM: CPT

## 2021-01-01 RX ORDER — LIRAGLUTIDE 6 MG/ML
1.2 INJECTION SUBCUTANEOUS DAILY
Qty: 6 ML | Refills: 3 | Status: SHIPPED | OUTPATIENT
Start: 2021-01-01 | End: 2021-01-01

## 2021-01-01 RX ORDER — GABAPENTIN 100 MG/1
100 CAPSULE ORAL 3 TIMES DAILY
Qty: 90 CAPSULE | Refills: 0 | Status: SHIPPED | OUTPATIENT
Start: 2021-01-01 | End: 2021-01-01

## 2021-01-01 RX ORDER — LISINOPRIL 40 MG/1
40 TABLET ORAL DAILY
Qty: 90 TABLET | Refills: 1 | Status: SHIPPED | OUTPATIENT
Start: 2021-01-01 | End: 2022-03-30

## 2021-01-01 RX ORDER — AMLODIPINE BESYLATE 10 MG/1
10 TABLET ORAL DAILY
Qty: 90 TABLET | Refills: 1 | Status: SHIPPED | OUTPATIENT
Start: 2021-01-01 | End: 2021-01-01

## 2021-01-01 RX ORDER — LISINOPRIL 40 MG/1
40 TABLET ORAL DAILY
Qty: 90 TABLET | Refills: 1 | Status: SHIPPED | OUTPATIENT
Start: 2021-01-01 | End: 2021-01-01

## 2021-01-01 RX ORDER — ATORVASTATIN CALCIUM 40 MG/1
40 TABLET, FILM COATED ORAL DAILY
Qty: 90 TABLET | Refills: 3 | Status: SHIPPED | OUTPATIENT
Start: 2021-01-01

## 2021-01-01 RX ORDER — FAMOTIDINE 20 MG/1
TABLET, FILM COATED ORAL
Qty: 60 TABLET | Refills: 11 | Status: SHIPPED | OUTPATIENT
Start: 2021-01-01

## 2021-01-01 RX ORDER — GUAIFENESIN/DEXTROMETHORPHAN 100-10MG/5
5 SYRUP ORAL EVERY 4 HOURS PRN
Qty: 118 ML | Refills: 0 | Status: SHIPPED | OUTPATIENT
Start: 2021-01-01 | End: 2021-01-01

## 2021-01-01 RX ORDER — DEXTROMETHORPHAN POLISTIREX 30 MG/5ML
30 SUSPENSION ORAL 2 TIMES DAILY PRN
Qty: 89 ML | Refills: 0 | Status: SHIPPED | OUTPATIENT
Start: 2021-01-01 | End: 2021-01-01 | Stop reason: ALTCHOICE

## 2021-01-01 RX ORDER — AMLODIPINE BESYLATE 10 MG/1
10 TABLET ORAL DAILY
Qty: 90 TABLET | Refills: 3 | Status: SHIPPED | OUTPATIENT
Start: 2021-01-01

## 2021-01-01 RX ORDER — INSULIN GLARGINE 100 [IU]/ML
INJECTION, SOLUTION SUBCUTANEOUS
Qty: 15 ML | Refills: 3 | Status: SHIPPED | OUTPATIENT
Start: 2021-01-01 | End: 2021-01-01

## 2021-01-01 RX ORDER — METOPROLOL TARTRATE 100 MG
100 TABLET ORAL 2 TIMES DAILY
Qty: 60 TABLET | Refills: 5 | Status: SHIPPED | OUTPATIENT
Start: 2021-01-01 | End: 2021-01-01

## 2021-01-01 RX ORDER — GABAPENTIN 100 MG/1
100 CAPSULE ORAL 3 TIMES DAILY
Qty: 90 CAPSULE | Refills: 2 | Status: SHIPPED | OUTPATIENT
Start: 2021-01-01 | End: 2021-01-01

## 2021-01-01 RX ORDER — VITAMIN B COMPLEX
1 TABLET ORAL DAILY
Qty: 100 TABLET | Refills: 3 | Status: SHIPPED | OUTPATIENT
Start: 2021-01-01

## 2021-01-01 RX ORDER — NITROGLYCERIN 0.4 MG/1
0.4 TABLET SUBLINGUAL EVERY 5 MIN PRN
Qty: 25 TABLET | Refills: 0 | Status: SHIPPED | OUTPATIENT
Start: 2021-01-01

## 2021-01-01 RX ORDER — PEN NEEDLE, DIABETIC 31 GX5/16"
NEEDLE, DISPOSABLE MISCELLANEOUS
Qty: 100 EACH | Refills: 9 | Status: SHIPPED | OUTPATIENT
Start: 2021-01-01

## 2021-01-01 RX ORDER — GABAPENTIN 100 MG/1
CAPSULE ORAL
Qty: 90 CAPSULE | Refills: 0 | Status: SHIPPED | OUTPATIENT
Start: 2021-01-01 | End: 2021-01-01

## 2021-01-01 RX ORDER — LIRAGLUTIDE 6 MG/ML
1.2 INJECTION SUBCUTANEOUS DAILY
Qty: 6 ML | Refills: 5 | Status: SHIPPED | OUTPATIENT
Start: 2021-01-01

## 2021-01-01 RX ORDER — METOPROLOL TARTRATE 100 MG
100 TABLET ORAL 2 TIMES DAILY
Qty: 60 TABLET | Refills: 5 | Status: SHIPPED | OUTPATIENT
Start: 2021-01-01

## 2021-01-01 RX ORDER — BLOOD SUGAR DIAGNOSTIC
STRIP MISCELLANEOUS
Qty: 50 STRIP | Refills: 9 | Status: SHIPPED | OUTPATIENT
Start: 2021-01-01

## 2021-01-01 RX ORDER — INSULIN GLARGINE 100 [IU]/ML
INJECTION, SOLUTION SUBCUTANEOUS
Qty: 15 ML | Refills: 3
Start: 2021-01-01

## 2021-01-01 ASSESSMENT — ANXIETY QUESTIONNAIRES
7. FEELING AFRAID AS IF SOMETHING AWFUL MIGHT HAPPEN: NOT AT ALL
GAD7 TOTAL SCORE: 6
IF YOU CHECKED OFF ANY PROBLEMS ON THIS QUESTIONNAIRE, HOW DIFFICULT HAVE THESE PROBLEMS MADE IT FOR YOU TO DO YOUR WORK, TAKE CARE OF THINGS AT HOME, OR GET ALONG WITH OTHER PEOPLE: NOT DIFFICULT AT ALL
2. NOT BEING ABLE TO STOP OR CONTROL WORRYING: SEVERAL DAYS
5. BEING SO RESTLESS THAT IT IS HARD TO SIT STILL: NOT AT ALL
GAD7 TOTAL SCORE: 6
6. BECOMING EASILY ANNOYED OR IRRITABLE: SEVERAL DAYS
3. WORRYING TOO MUCH ABOUT DIFFERENT THINGS: SEVERAL DAYS
1. FEELING NERVOUS, ANXIOUS, OR ON EDGE: SEVERAL DAYS

## 2021-01-01 ASSESSMENT — SLIT LAMP EXAM - LIDS
COMMENTS: NORMAL
COMMENTS: NORMAL

## 2021-01-01 ASSESSMENT — EXTERNAL EXAM - LEFT EYE: OS_EXAM: NORMAL

## 2021-01-01 ASSESSMENT — VISUAL ACUITY
OD_CC+: -1
METHOD: SNELLEN - LINEAR
OD_CC: 20/20
OS_CC+: +2
OS_CC: 20/40

## 2021-01-01 ASSESSMENT — TONOMETRY
OD_IOP_MMHG: 13
OS_IOP_MMHG: 16
IOP_METHOD: TONOPEN

## 2021-01-01 ASSESSMENT — REFRACTION_MANIFEST
OD_CYLINDER: +1.00
OS_AXIS: 155
OS_ADD: +2.50
OD_AXIS: 030
OD_ADD: +2.50
OS_CYLINDER: +1.25
OS_SPHERE: -1.50
OD_SPHERE: -1.75

## 2021-01-01 ASSESSMENT — MIFFLIN-ST. JEOR
SCORE: 1534.95
SCORE: 1532.67
SCORE: 1530.18
SCORE: 1560.8

## 2021-01-01 ASSESSMENT — REFRACTION_WEARINGRX
OD_AXIS: 032
OD_CYLINDER: +1.00
OD_SPHERE: -1.75
OS_CYLINDER: +1.25
OS_SPHERE: -2.00
OS_ADD: +2.50
OS_AXIS: 154
OD_ADD: +2.50

## 2021-01-01 ASSESSMENT — EXTERNAL EXAM - RIGHT EYE: OD_EXAM: NORMAL

## 2021-01-01 ASSESSMENT — PATIENT HEALTH QUESTIONNAIRE - PHQ9
SUM OF ALL RESPONSES TO PHQ QUESTIONS 1-9: 6
5. POOR APPETITE OR OVEREATING: MORE THAN HALF THE DAYS

## 2021-01-01 ASSESSMENT — CONF VISUAL FIELD
OD_NORMAL: 1
OS_NORMAL: 1

## 2021-01-01 ASSESSMENT — CUP TO DISC RATIO
OD_RATIO: 0.3
OS_RATIO: 0.3

## 2021-01-15 DIAGNOSIS — Z79.4 TYPE 2 DIABETES MELLITUS WITH HYPOGLYCEMIA WITHOUT COMA, WITH LONG-TERM CURRENT USE OF INSULIN (H): ICD-10-CM

## 2021-01-15 DIAGNOSIS — E11.649 TYPE 2 DIABETES MELLITUS WITH HYPOGLYCEMIA WITHOUT COMA, WITH LONG-TERM CURRENT USE OF INSULIN (H): ICD-10-CM

## 2021-01-16 NOTE — TELEPHONE ENCOUNTER
12/15/2020  [facility] [employer]   Leah Ford APRN CNP  Family Medicine    blood glucose monitoring (ACCU-CHEK MULTICLIX) lancets

## 2021-02-09 NOTE — TELEPHONE ENCOUNTER
insulin glargine (LANTUS PEN) 100 UNIT/ML pen   Last Written Prescription Date:  9/8/2020  Last Fill Quantity: 15,   # refills: 2  Last Office Visit : 12/15/2020  Future Office visit:  None    Routing refill request to provider for review/approval because:  Clarification of orders please.      Is it 20 Units?  Ord 22 Units twice a day?  Order in chart and order from pharmacy do not match up.  Please confirm and send new updated order to pharmacy for Pt care.     Thank you       Claire Campos RN  Central Triage Red Flags/Med Refills      Instructions   12/15/2020    Diabetes Type 2  Continue Victoza at 1.2 mg daily  Reduce Lantus to 22 Units twice daily. Will ask pharmacist, Sung, to call you to review medications.   Goal Blood Sugar range 100-130  Goal A1c 7.0 Today your A1c is 7.4  A1C today:  Plan to recheck urine microalbumin in March with next check up  Continue to eat healthy and exercise regularly  Make sure you drink around 6 cups of water per day     Chest pain  12 lead EKG: Subtle changes. Referred to Cardiology  Stress echo given recent chest pain and risk factors: reviewed nothing by mouth 3 hours before procedure; hold metoprolol in am of procedure, eat light am meal 4 h before procedure and reduce am dose of Lantus and Victoza.

## 2021-02-11 NOTE — TELEPHONE ENCOUNTER
Called patient back.  Left message for patient to call clinic and schedule a phone appointment with me.

## 2021-02-11 NOTE — TELEPHONE ENCOUNTER
Called patient to confirm insulin dose per Leah Ford's request.  Left voice mail and noted I would call back later today.

## 2021-02-16 NOTE — PROGRESS NOTES
"Medication Therapy Management (MTM) Encounter    ASSESSMENT:                            Medication Adherence/Access: {adherencechoices:358569}    ***: ***  ***: ***  ***: ***  ***: ***    PLAN:                            ***    Follow-up: ***    SUBJECTIVE/OBJECTIVE:                          Phani Jeronimo is a 61 year old male { :210497} for {mtmvisit:998981}     Reason for visit: ***.    Allergies/ADRs: {1/2/3/4/5:034359}  Tobacco: He reports that he has never smoked. He quit smokeless tobacco use about 17 years ago.  His smokeless tobacco use included snuff.  Alcohol: {ALCOHOL CONSUMPTION HX:679872}  Caffeine: {CAFFEINE INTAKE:979072}  Activity: ***  Past Medical History: {1/2/3/4/5:779162}  {Social and Goals:891930}    Medication Adherence/Access: {fumedadherence:448896}    Diabetes: ***  HTN: ***  ***: ***  ***: ***  ***: ***    Today's Vitals: There were no vitals taken for this visit.  ----------------  {HAYDEE?:095456}    I spent {mtm total time 3:819708} with this patient today{MTMpartdbillingquestion:237324}. { :688956}. A copy of the visit note was provided to the patient's {[facility] chart:588428} provider.    The patient {GIVEN/NOT GIVEN:162932::\"was given\"} a summary of these recommendations. {covisit:744430}    ***    Telemedicine Visit Details  Type of service:  {telemedvisitmtm:012789::\"Telephone visit\"}  Start Time: {video/phone visit start time:152948}  End Time: {video/phone visit end time:152948}  Originating Location (patient location): Home  Distant Location (provider location):  [facility] [facility]    {Click at end of visit to add-in MTP:288444}      "

## 2021-02-16 NOTE — Clinical Note
Virgil Jeronimo was taking insulin glargine 22 units BID.  Based on his low fasting readings, I recommended he change to insulin glargine 20 units BID (which was the order that you sent).  Also, I refilled metformin (he's on last refill) and glucose tablets (he ran out of those).  See note for more details!  - Lenora

## 2021-02-16 NOTE — PROGRESS NOTES
"Telephone/video visits are billed at different rates depending on your insurance coverage. During this emergency period, for some insurers they may be billed the same as an in-person visit.  Please reach out to your insurance provider with any questions.  If during the course of the call the physician/provider feels a telephone visit is not appropriate, you will not be charged for this service.    SUBJECTIVE: Phani is a 61 year old male who was referred by Leah Ford for [facility] services for diabetes management.      Diabetes: Phani notes that his blood glucose control is improving. He has been consistently taking metformin, but at times forgets to take insulin (once-twice per week).  He is taking insulin glargine 22 units BID. He is taking liraglutide and feels that is working well.     Self reported fastin-85. Most of the time it is around 70.   When his blood sugars are in the 60s, he feels a bit shakey.   He mostly just takes his fasting readings, but will check his blood sugar if he's feeling \"off.\" This only occurs about once/month.     He hasn't been getting much exercise recently due to the extreme cold temperatures.  However, he would like to start walking more and plans to when it gets warmer in a few days.     Environmental factors that impact patient: English is a second language. Also, observes Ramadan and so will need to plan for that in early April. (Ramadan starts )      Patient Active Problem List   Diagnosis     Hypercholesteremia     CARDIOVASCULAR SCREENING; LDL GOAL LESS THAN 100     Hypertension goal BP (blood pressure) < 130/80     Type 2 diabetes, HbA1C goal < 8% (H)     Advanced directives, counseling/discussion     Impingement syndrome, shoulder     Pain in joint, shoulder region     Type 2 diabetes mellitus without complication (H)     Vitamin D deficiency        OBJECTIVE:     SAURAV-7 SCORE 2018   Total Score 6 5       PHQ-9 SCORE 2018   PHQ-9 Total " "Score 4 8         VITALS:  BP Readings from Last 3 Encounters:   12/15/20 (!) 147/75   09/22/20 137/69   07/16/20 125/74           Weight:   Wt Readings from Last 1 Encounters:   12/15/20 81 kg (178 lb 9.6 oz)       Height:   Ht Readings from Last 1 Encounters:   12/15/20 1.722 m (5' 7.8\")       LABORATORY VALUES:   Last A1C:    Lab Results   Component Value Date    A1C 7.4 12/15/2020   .    Last Basic Metabolic Panel:  Lab Results   Component Value Date     09/22/2020      Lab Results   Component Value Date    POTASSIUM 4.3 09/22/2020     Lab Results   Component Value Date    CHLORIDE 106 09/22/2020     Lab Results   Component Value Date    LIU 9.8 09/22/2020     Lab Results   Component Value Date    CO2 24 09/22/2020     Lab Results   Component Value Date    BUN 18 09/22/2020     Lab Results   Component Value Date    CR 0.99 09/22/2020     Lab Results   Component Value Date     09/22/2020       Lipid Panel Labs  Lab Results   Component Value Date    CHOL 164 09/22/2020    CHOL 148 03/12/2019     Lab Results   Component Value Date    TRIG 207 09/22/2020    TRIG 102 03/12/2019     Lab Results   Component Value Date    HDL 37 09/22/2020    HDL 38 03/12/2019     Lab Results   Component Value Date    LDL 86 09/22/2020    LDL 90 03/12/2019       Hepatic Panel Labs  Lab Results   Component Value Date    AST 10 09/22/2020     Lab Results   Component Value Date    ALT 19 09/22/2020         SOCIAL AND FAMILY HISTORY  Social History     Tobacco Use     Smoking status: Never Smoker     Smokeless tobacco: Former User     Types: Snuff   Substance Use Topics     Alcohol use: No    .  Most Recent Immunizations   Administered Date(s) Administered     DTAP (<7y) 05/13/2003     DTaP, Unspecified 12/23/2011     FLU 6-35 months 12/09/2004     Flu, Unspecified 12/09/2004     Influenza (IIV3) PF 12/23/2011     Influenza Vaccine IM > 6 months Valent IIV4 11/01/2019     MMR 05/13/2003     Pneumococcal 23 valent 12/23/2011     " TDAP Vaccine (Adacel) 12/23/2011     Td (Adult), Adsorbed 05/13/2003     Tdap (Adult) Unspecified Formulation 12/23/2011       CURRENT MEDICATIONS:   Current Outpatient Medications   Medication Sig Dispense Refill     amLODIPine (NORVASC) 10 MG tablet Take 1 tablet (10 mg) by mouth daily 90 tablet 1     aspirin (ASA) 81 MG chewable tablet Take 1 tablet (81 mg) by mouth daily 100 tablet 2     atorvastatin (LIPITOR) 40 MG tablet Take 1 tablet (40 mg) by mouth daily 90 tablet 1     blood glucose (NO BRAND SPECIFIED) test strip Test twice daily. 2 Box 4     blood glucose (NO BRAND SPECIFIED) test strip Use to test blood sugar 2 times daily or as directed. 100 each 1     blood glucose monitoring (ACCU-CHEK COMPACT CARE KIT) meter device kit Use to test blood sugars 1-2 times daily or as directed. 1 kit 0     blood glucose monitoring (ACCU-CHEK MULTICLIX) lancets Use to test blood sugar 1-2 times daily or as directed. 102 each 10     Blood Pressure Monitoring (PREMIUM AUTOMATIC BP MONITOR) CHIARA 1 Device daily 1 each 0     famotidine (PEPCID) 20 MG tablet Take 1 tablet (20 mg) by mouth 2 times daily 60 tablet 11     gabapentin (NEURONTIN) 100 MG capsule Take 1 capsule (100 mg) by mouth 3 times daily 90 capsule 2     glucose (BD GLUCOSE) 4 g chewable tablet Take 2 tablets by mouth as needed (for blood sugar less than 70.) 20 tablet 6     glucose (BD GLUCOSE) 5 g chewable tablet Take 2 tablets (10 g) by mouth as needed (prn blood sugar < 70) 15 tablet 0     insulin glargine (LANTUS PEN) 100 UNIT/ML pen Inject 20 Units Subcutaneous 2 times daily 15 mL 3     insulin pen needle (31G X 8 MM) 31G X 8 MM miscellaneous Use to inject insulin twice daily and Victoza once daily 100 each 11     liraglutide (VICTOZA PEN) 18 MG/3ML solution Inject 1.2 mg Subcutaneous daily 6 mL 3     lisinopril (ZESTRIL) 40 MG tablet Take 1 tablet (40 mg) by mouth daily 90 tablet 1     metFORMIN (GLUCOPHAGE) 500 MG tablet Take 2 tablets (1,000 mg) by  mouth 2 times daily (with meals) 120 tablet 2     metoprolol tartrate (LOPRESSOR) 100 MG tablet Take 1 tablet (100 mg) by mouth 2 times daily 60 tablet 5     nitroGLYcerin (NITROSTAT) 0.4 MG sublingual tablet PLEASE SEE ATTACHED FOR DETAILED DIRECTIONS 25 tablet 0     Vitamin D3 (CHOLECALCIFEROL) 25 mcg (1000 units) tablet Take 1 tablet (25 mcg) by mouth daily 100 tablet 3       ALLERGIES:     Allergies   Allergen Reactions     Latex Rash     Latex           ASSESSMENT:    Diabetes:  Above goal based on A1c.  Goal A1c <7%. Below goal based on fasting readings. Goal fasting is  mg/dl.  It will be best for Phani's fasting readings to be a bit higher.  Also, given these episodes of hypoglycemia, need to ensure he is aware of how to treat hypoglycemia.  MTP: Dose too high (insulin)  MTP: Unavailable medication (glucose tablets)    All medications were reviewed and found to be indicated, effective, safe and convenient unless drug therapy problem identified as described above.    PLAN:   - Decrease insulin to 20 units BID.  - Will call clinic if readings are below 70.  - Reviewed plan for hypoglycemia.    - Will refill glucose tablets since patient is out  - Refilled metformin since patient will be out of refills in one month  - Follow up over phone in 2 weeks    Options for treatment and/or follow-up care were reviewed with the patient. Phani Jeronimo was engaged and actively involved in the decision making process. He/She verbalized understanding of the options discussed and was satisfied with the final plan.    Patient was provided with written instructions/medication list via AVS.       Medical conditions reviewed: 1    Medications reviewed: 3    MTP identified: 2    Time spent: 20 minutes    Level of service:2

## 2021-03-02 NOTE — PROGRESS NOTES
Telephone/video visits are billed at different rates depending on your insurance coverage. During this emergency period, for some insurers they may be billed the same as an in-person visit.  Please reach out to your insurance provider with any questions.  If during the course of the call the physician/provider feels a telephone visit is not appropriate, you will not be charged for this service.    SUBJECTIVE: Phani is a 61 year old male who was referred by Leah Ford DNP for [facility] services for diabetes management.     Phani notes that he recently had the flu (was tested for COVID-19 twice and was negative).  He is feeling better now, but has a lingering cough.     Diabetes: Taking 20 units insulin glargine bid, metformin and liraglutide. Occassionally misses doses. Fasting blood sugars have been between 70-90s. Has not had any hypoglycemia since changing insulin dose to 20 units twice daily. Patient knows signs and symptoms of hypoglycemia and how to correct low blood sugars.    When we discussed follow up with Leah Ford, Phani noted that he would like to wait until April so that he has time to rest and recover given his recent flu.    Environmental factors that impact patient: Will be observing Ramadan next month.      Patient Active Problem List   Diagnosis     Hypercholesteremia     CARDIOVASCULAR SCREENING; LDL GOAL LESS THAN 100     Hypertension goal BP (blood pressure) < 130/80     Type 2 diabetes, HbA1C goal < 8% (H)     Advanced directives, counseling/discussion     Impingement syndrome, shoulder     Pain in joint, shoulder region     Type 2 diabetes mellitus without complication (H)     Vitamin D deficiency        OBJECTIVE:     SAURAV-7 SCORE 11/20/2018 6/11/2019   Total Score 6 5       PHQ-9 SCORE 11/20/2018 6/11/2019   PHQ-9 Total Score 4 8         VITALS:  BP Readings from Last 3 Encounters:   12/15/20 (!) 147/75   09/22/20 137/69   07/16/20 125/74           Weight:   Wt Readings from Last 1 Encounters:  "  12/15/20 81 kg (178 lb 9.6 oz)       Height:   Ht Readings from Last 1 Encounters:   12/15/20 1.722 m (5' 7.8\")       LABORATORY VALUES:   Last A1C:    Lab Results   Component Value Date    A1C 7.4 12/15/2020   .    Last Basic Metabolic Panel:  Lab Results   Component Value Date     09/22/2020      Lab Results   Component Value Date    POTASSIUM 4.3 09/22/2020     Lab Results   Component Value Date    CHLORIDE 106 09/22/2020     Lab Results   Component Value Date    LIU 9.8 09/22/2020     Lab Results   Component Value Date    CO2 24 09/22/2020     Lab Results   Component Value Date    BUN 18 09/22/2020     Lab Results   Component Value Date    CR 0.99 09/22/2020     Lab Results   Component Value Date     09/22/2020       Lipid Panel Labs  Lab Results   Component Value Date    CHOL 164 09/22/2020    CHOL 148 03/12/2019     Lab Results   Component Value Date    TRIG 207 09/22/2020    TRIG 102 03/12/2019     Lab Results   Component Value Date    HDL 37 09/22/2020    HDL 38 03/12/2019     Lab Results   Component Value Date    LDL 86 09/22/2020    LDL 90 03/12/2019       Hepatic Panel Labs  Lab Results   Component Value Date    AST 10 09/22/2020     Lab Results   Component Value Date    ALT 19 09/22/2020         SOCIAL AND FAMILY HISTORY  Social History     Tobacco Use     Smoking status: Never Smoker     Smokeless tobacco: Former User     Types: Snuff   Substance Use Topics     Alcohol use: No    .  Most Recent Immunizations   Administered Date(s) Administered     DTAP (<7y) 05/13/2003     DTaP, Unspecified 12/23/2011     FLU 6-35 months 12/09/2004     Flu, Unspecified 12/09/2004     Influenza (IIV3) PF 12/23/2011     Influenza Vaccine IM > 6 months Valent IIV4 11/01/2019     MMR 05/13/2003     Pneumococcal 23 valent 12/23/2011     TDAP Vaccine (Adacel) 12/23/2011     Td (Adult), Adsorbed 05/13/2003     Tdap (Adult) Unspecified Formulation 12/23/2011       CURRENT MEDICATIONS:   Current Outpatient " Medications   Medication Sig Dispense Refill     dextromethorphan (DELSYM) 30 MG/5ML liquid Take 5 mLs (30 mg) by mouth 2 times daily as needed for cough 89 mL 0     amLODIPine (NORVASC) 10 MG tablet Take 1 tablet (10 mg) by mouth daily 90 tablet 1     aspirin (ASA) 81 MG chewable tablet Take 1 tablet (81 mg) by mouth daily 100 tablet 2     atorvastatin (LIPITOR) 40 MG tablet Take 1 tablet (40 mg) by mouth daily 90 tablet 1     blood glucose (NO BRAND SPECIFIED) test strip Test twice daily. 2 Box 4     blood glucose (NO BRAND SPECIFIED) test strip Use to test blood sugar 2 times daily or as directed. 100 each 1     blood glucose monitoring (ACCU-CHEK COMPACT CARE KIT) meter device kit Use to test blood sugars 1-2 times daily or as directed. 1 kit 0     blood glucose monitoring (ACCU-CHEK MULTICLIX) lancets Use to test blood sugar 1-2 times daily or as directed. 102 each 10     Blood Pressure Monitoring (PREMIUM AUTOMATIC BP MONITOR) CHIARA 1 Device daily 1 each 0     famotidine (PEPCID) 20 MG tablet Take 1 tablet (20 mg) by mouth 2 times daily 60 tablet 11     gabapentin (NEURONTIN) 100 MG capsule Take 1 capsule (100 mg) by mouth 3 times daily 90 capsule 2     glucose (BD GLUCOSE) 4 g chewable tablet Take 2 tablets by mouth as needed (for blood sugar less than 70.) 20 tablet 6     glucose (BD GLUCOSE) 5 g chewable tablet Take 2 tablets (10 g) by mouth as needed (prn blood sugar < 70) 15 tablet 0     insulin glargine (LANTUS PEN) 100 UNIT/ML pen Inject 20 Units Subcutaneous 2 times daily 15 mL 3     insulin pen needle (31G X 8 MM) 31G X 8 MM miscellaneous Use to inject insulin twice daily and Victoza once daily 100 each 11     liraglutide (VICTOZA PEN) 18 MG/3ML solution Inject 1.2 mg Subcutaneous daily 6 mL 3     lisinopril (ZESTRIL) 40 MG tablet Take 1 tablet (40 mg) by mouth daily 90 tablet 1     metFORMIN (GLUCOPHAGE) 500 MG tablet Take 2 tablets (1,000 mg) by mouth 2 times daily (with meals) 120 tablet 2      metoprolol tartrate (LOPRESSOR) 100 MG tablet Take 1 tablet (100 mg) by mouth 2 times daily 60 tablet 5     nitroGLYcerin (NITROSTAT) 0.4 MG sublingual tablet PLEASE SEE ATTACHED FOR DETAILED DIRECTIONS 25 tablet 0     Vitamin D3 (CHOLECALCIFEROL) 25 mcg (1000 units) tablet Take 1 tablet (25 mcg) by mouth daily 100 tablet 3       ALLERGIES:     Allergies   Allergen Reactions     Latex Rash     Latex           ASSESSMENT:    Diabetes type 2: Patients blood sugars fasting in the morning are still below goal of . Patient may benefit from decreasing dose of insulin while continuing victoza and metformin.   MTP: Dose too high    Illness: Phani notes that he has a cough that was associated with the flu.  He was tested for COVID-19 two times since these symptoms started and tested negative.  He is requesting Robitussin to help with his cough since this has worked for him in the past.  MTP: Needs additional medication    All medications were reviewed and found to be indicated, effective, safe and convenient unless drug therapy problem identified as described above.    PLAN:     1. Take 19 units of Lantus twice daily.  2. Continue your other medications.  3. Sent in prescription for Robitussin.    Follow-up: In one month to review blood sugars and also plan for antiglycemic medications due to Ramadan.      Options for treatment and/or follow-up care were reviewed with the patient. Phani Jeronimo was engaged and actively involved in the decision making process. He/She verbalized understanding of the options discussed and was satisfied with the final plan.    Patient was provided with written instructions/medication list via AVS.       Medical conditions reviewed: 2    Medications reviewed: 5    MTP identified: 2    Time spent: 20 minutes    Level of service:3

## 2021-03-02 NOTE — Clinical Note
Virgil Lynn - I had a phone call with Phani.  His fasting readings are still slightly low, so I decreased his insulin to 19 units BID.  Additionally, as I mentioned on the phone, he has a cough (negative for COVID) and requested dextromethorphan.  I sent in that prescription for him.  I will follow up in a month to assess BG and also prepare him for Ramadan.  At that time, I'll remind him again that he needs to schedule a visit with you.  Thanks, Lenora

## 2021-04-01 NOTE — PROGRESS NOTES
"Telephone/video visits are billed at different rates depending on your insurance coverage. During this emergency period, for some insurers they may be billed the same as an in-person visit.  Please reach out to your insurance provider with any questions.  If during the course of the call the physician/provider feels a telephone visit is not appropriate, you will not be charged for this service.    SUBJECTIVE: Phani is a 61 year old male who was referred by Leah Ford for [facility] services for diabetes follow up.     Patient reported he needs refills on his vitamin D and liraglutide. He has enough insulin glargine.     Diabetes: Currently taking 19 units of insulin glargine BID, metformin and liraglutide.  Occasionally misses his evening dose of insulin glargine.  Fasting blood sugars have been 80s-120 - rarely 130.  Has not had any hypoglycemia since changing insulin dose to 19 units twice daily.  Patient knows signs and symptoms of hypoglycemia and how to correct low blood sugars.        Environmental factors that impact patient: Will be observing Ramadan from Monday April 12th to Tuesday May 11th.       Patient Active Problem List   Diagnosis     Hypercholesteremia     CARDIOVASCULAR SCREENING; LDL GOAL LESS THAN 100     Hypertension goal BP (blood pressure) < 130/80     Type 2 diabetes, HbA1C goal < 8% (H)     Advanced directives, counseling/discussion     Impingement syndrome, shoulder     Pain in joint, shoulder region     Type 2 diabetes mellitus without complication (H)     Vitamin D deficiency        OBJECTIVE:     SAURAV-7 SCORE 11/20/2018 6/11/2019   Total Score 6 5       PHQ-9 SCORE 11/20/2018 6/11/2019   PHQ-9 Total Score 4 8         VITALS:  BP Readings from Last 3 Encounters:   12/15/20 (!) 147/75   09/22/20 137/69   07/16/20 125/74           Weight:   Wt Readings from Last 1 Encounters:   12/15/20 81 kg (178 lb 9.6 oz)       Height:   Ht Readings from Last 1 Encounters:   12/15/20 1.722 m (5' 7.8\") "       LABORATORY VALUES:   Last A1C:    Lab Results   Component Value Date    A1C 7.4 12/15/2020   .    Last Basic Metabolic Panel:  Lab Results   Component Value Date     09/22/2020      Lab Results   Component Value Date    POTASSIUM 4.3 09/22/2020     Lab Results   Component Value Date    CHLORIDE 106 09/22/2020     Lab Results   Component Value Date    LIU 9.8 09/22/2020     Lab Results   Component Value Date    CO2 24 09/22/2020     Lab Results   Component Value Date    BUN 18 09/22/2020     Lab Results   Component Value Date    CR 0.99 09/22/2020     Lab Results   Component Value Date     09/22/2020       Lipid Panel Labs  Lab Results   Component Value Date    CHOL 164 09/22/2020    CHOL 148 03/12/2019     Lab Results   Component Value Date    TRIG 207 09/22/2020    TRIG 102 03/12/2019     Lab Results   Component Value Date    HDL 37 09/22/2020    HDL 38 03/12/2019     Lab Results   Component Value Date    LDL 86 09/22/2020    LDL 90 03/12/2019       Hepatic Panel Labs  Lab Results   Component Value Date    AST 10 09/22/2020     Lab Results   Component Value Date    ALT 19 09/22/2020         SOCIAL AND FAMILY HISTORY  Social History     Tobacco Use     Smoking status: Never Smoker     Smokeless tobacco: Former User     Types: Snuff   Substance Use Topics     Alcohol use: No    .  Most Recent Immunizations   Administered Date(s) Administered     DTAP (<7y) 05/13/2003     DTaP, Unspecified 12/23/2011     FLU 6-35 months 12/09/2004     Flu, Unspecified 12/09/2004     Influenza (IIV3) PF 12/23/2011     Influenza Vaccine IM > 6 months Valent IIV4 11/01/2019     MMR 05/13/2003     Pneumococcal 23 valent 12/23/2011     TDAP Vaccine (Adacel) 12/23/2011     Td (Adult), Adsorbed 05/13/2003     Tdap (Adult) Unspecified Formulation 12/23/2011       CURRENT MEDICATIONS:   Current Outpatient Medications   Medication Sig Dispense Refill     amLODIPine (NORVASC) 10 MG tablet Take 1 tablet (10 mg) by mouth daily  90 tablet 1     aspirin (ASA) 81 MG chewable tablet Take 1 tablet (81 mg) by mouth daily 100 tablet 2     atorvastatin (LIPITOR) 40 MG tablet Take 1 tablet (40 mg) by mouth daily 90 tablet 1     blood glucose (NO BRAND SPECIFIED) test strip Test twice daily. 2 Box 4     blood glucose (NO BRAND SPECIFIED) test strip Use to test blood sugar 2 times daily or as directed. 100 each 1     blood glucose monitoring (ACCU-CHEK COMPACT CARE KIT) meter device kit Use to test blood sugars 1-2 times daily or as directed. 1 kit 0     blood glucose monitoring (ACCU-CHEK MULTICLIX) lancets Use to test blood sugar 1-2 times daily or as directed. 102 each 10     Blood Pressure Monitoring (PREMIUM AUTOMATIC BP MONITOR) CHIARA 1 Device daily 1 each 0     famotidine (PEPCID) 20 MG tablet Take 1 tablet (20 mg) by mouth 2 times daily 60 tablet 11     gabapentin (NEURONTIN) 100 MG capsule Take 1 capsule (100 mg) by mouth 3 times daily 90 capsule 2     glucose (BD GLUCOSE) 5 g chewable tablet Take 2 tablets (10 g) by mouth as needed (prn blood sugar < 70) 15 tablet 0     guaiFENesin-dextromethorphan (ROBITUSSIN DM) 100-10 MG/5ML syrup Take 5 mLs by mouth every 4 hours as needed for cough 118 mL 0     insulin glargine (LANTUS PEN) 100 UNIT/ML pen Inject 19 Units Subcutaneous 2 times daily 15 mL 3     insulin pen needle (31G X 8 MM) 31G X 8 MM miscellaneous Use to inject insulin twice daily and Victoza once daily 100 each 11     liraglutide (VICTOZA PEN) 18 MG/3ML solution Inject 1.2 mg Subcutaneous daily 6 mL 3     lisinopril (ZESTRIL) 40 MG tablet Take 1 tablet (40 mg) by mouth daily 90 tablet 1     metFORMIN (GLUCOPHAGE) 500 MG tablet Take 2 tablets (1,000 mg) by mouth 2 times daily (with meals) 120 tablet 2     metoprolol tartrate (LOPRESSOR) 100 MG tablet Take 1 tablet (100 mg) by mouth 2 times daily 60 tablet 5     nitroGLYcerin (NITROSTAT) 0.4 MG sublingual tablet PLEASE SEE ATTACHED FOR DETAILED DIRECTIONS 25 tablet 0     Vitamin D3  (CHOLECALCIFEROL) 25 mcg (1000 units) tablet Take 1 tablet (25 mcg) by mouth daily 100 tablet 3       ALLERGIES:     Allergies   Allergen Reactions     Latex Rash     Latex           ASSESSMENT:    Medication adherence: Called patient's pharmacy regarding refills.  Pharmacy reports that there are refills on all of his medications     Diabetes: Uncontrolled - Patient's A1C is above goal of 7% (although his fasting blood sugars are at goal of 80 - 130).   Patient is due for a repeat A1C test.  Discussed diabetes management plan during Ramadan with the patient which included decreasing insulin glargine to once a day dosing, and breaking one's fast if hypoglycemic.  Patient was agreeable to this plan.    MTP: Safety/dose too high for Ramadan    All medications were reviewed and found to be indicated, effective, safe and convenient unless drug therapy problem identified as described above.    PLAN:     1. Continue medications as is.   2.  During Ramadan - decrease insulin glargine 19 units twice daily to 19 units once daily.  Break fast if blood sugars are less than 70.    3.   vitamin D and metoprolol from your pharmacy.    4. Arrange for visit with Leah Ford to get follow up A1c.  5. Follow up with this writer in two weeks to assess changes in insulin for Ramadan.    Options for treatment and/or follow-up care were reviewed with the patient. Phani Jeronimo was engaged and actively involved in the decision making process. He/She verbalized understanding of the options discussed and was satisfied with the final plan.    Patient was provided with written instructions/medication list via AVS.     I was present with the pharmacy student who participated in the service and in the documentation of this note. I have verified the history, personally performed the medical decision making, and have verified the content of the note, which accurately reflects my assessment of the patient and the plan of care.Lenora  Meg Mo       Medical conditions reviewed: 1    Medications reviewed: 13    MTP identified: 1    Time spent: 20 minutes    Level of service: 2

## 2021-04-01 NOTE — Clinical Note
Virgil Lynn, I talked to Phani on Thursday.  He is dong well - BG at goal and no hypoglycemia.  He and I developed a plan for Ramadan and I will call him again in two weeks after Ramadan has started to see how he is doing.  I advised him to make an appointment with you for follow up A1c and to check in. Fred Cooley

## 2021-04-15 NOTE — PROGRESS NOTES
Medication Therapy Management (MTM) Encounter    ASSESSMENT:                            Medication Adherence/Access: No issues identified    DM: Uncontrolled- Patient is not meeting his A1C goal of <7%, however, he is meeting his fasting blood glucose goal of <130. There are concerns of hypoglycemia given his glucose readings in the 60s, and he would benefit from decreasing his Lantus.   Patient is due for a repeat A1C test.      PLAN:                            1. Decrease insulin to 16 units daily  2. Break fast if blood sugars are less than 70, and also let this writer know so that insulin can be adjusted more.  3. Schedule a visit with Leah Ford to get follow up A1c and diabetes follow up    Follow-up: 1 week    SUBJECTIVE/OBJECTIVE:                          Phani Jeronimo is a 61 year old male called for a follow-up visit. He was referred to me from Leah Ford. Today's visit is a follow-up MTM visit from 4/1/21     Reason for visit: Diabates follow up.    Allergies/ADRs: Reviewed in chart  Tobacco: He reports that he has never smoked. He quit smokeless tobacco use about 17 years ago.  His smokeless tobacco use included snuff.  Past Medical History: Reviewed in chart    Medication Adherence/Access: no issues reported    DM: Currently taking Lantus 19 units daily, Metformin 1000 mg daily, and Liraglutide 1.2 mg daily.  This is a reduction in his normal doses of medications due to Ramadan. Patient takes all his medications after he breaks his fast. He denies symptoms of hypoglycemia. . He typically checks BG three times a day: before Suhoor (meal before fasting) ranges  mg/dL, 2-3 hours before Iftar (before breaking fast) ranges 60-70 mg/dL, and morning (7-8 am) ranges 150-170 mg/dL.  Lab Results   Component Value Date    A1C 7.4 12/15/2020    A1C 9.1 09/22/2020    A1C 7.0 05/07/2020    A1C 7.1 12/10/2019    A1C 8.0 09/10/2019      Environmental factors that impact patient: Will be observing  [place] from Tuesday April 13th to Tuesday May 11th.        Today's Vitals: There were no vitals taken for this visit.  ----------------      I spent 30 minutes with this patient today. A copy of the visit note was provided to the patient's primary care provider.    The patient has a summary of these recommendations available in [employer].    Sharon Huffman IV pharmacy student     I was present with the pharmacy student who participated in the service and in the documentation of this note. I have verified the history, personally performed the medical decision making, and have verified the content of the note, which accurately reflects my assessment of the patient and the plan of care. Lenora Mo, PharmD    Telemedicine Visit Details  Type of service:  Telephone visit  Start Time: 10:30 AM  End Time: 11:00 AM  Originating Location (patient location): [place]  Distant Location (provider location):  [facility] [employer]

## 2021-04-15 NOTE — Clinical Note
Virgil Lynn, I had a phone call yesterday with Phani.  In early April, I planned with him to decrease his insulin dose by 50% at the start of Ramadan.  During this call he noted readings in the 60s before the end of the day and so I recommended a further decrease in his insulin dose. I will have a follow up call with him in one week.  I again reminded him to make an appointment with you. Fred Cooley

## 2021-04-20 NOTE — TELEPHONE ENCOUNTER
gabapentin (NEURONTIN) 100 MG capsule      Last Written Prescription Date:  11/24/2020  Last Fill Quantity: 90 cap,   # refills: 2  Last Office Visit : 12/15/2020  Future Office visit:  none    Routing refill request to provider for review/approval because:  Medication not on the refill protocol.

## 2021-04-22 NOTE — Clinical Note
Virgil Lynn - Had a phone visit with Phani on Thursday (following weekly during Ramadan).  He made adjustments to his insulin on his own and we created a plan to get him back on track.  No low blood sugars as of now - I'm mainly trying to ensure safety for him right now.  He did have chest heaviness when he was off metoprolol which resolved when he restarted metoprolol.  Please let me know if I should follow up on that in any additional way. - Lenora

## 2021-04-22 NOTE — PROGRESS NOTES
Medication Therapy Management (MTM) Encounter    SUBJECTIVE/OBJECTIVE:                          Phani Jeronimo is a 61 year old male called for a follow-up visit. He was referred to me from Leah Ford. Today's visit is a follow-up MTM visit from 4/15/21. At last visit, instructed Phani to reduce his insulin from 19 units daily to 16 units daily (During Ramadan).     Reason for visit: Diabetes follow up.    Allergies/ADRs: Reviewed in chart  Tobacco: He reports that he has never smoked. He quit smokeless tobacco use about 17 years ago.  His smokeless tobacco use included snuff.  Past Medical History: Reviewed in chart    Medication Adherence/Access: no issues reported    DM: Taking Metformin 1000 mg BID, Liraglutide 1.2 mg daily and, Lantus 16 units BID. Takes his medications after Iftar (meal to break fast) and at suhoor (meal before fasting). He checks fasting and pre-iftar blood glucose levels daily; Fasting blood glucose ranges between 120-131, and pre iftar blood glucose (6-7pm) ranges between 100-140.  About 3-4 days ago, patient decided to go back to taking metformin and lantus twice a day because his blood glucose readings were in the 200s.  He attributes the increase in blood glucose to drinking diet soda and juice after he breaks his fast. He plans on making some dietary changes, such as drinking less juice and eating fruits.  Prior to this change, he was taking Metformin 1000 mg once a day and Lantus 16 units once a day.    Additionally, patient denies any sign and symptoms of hypoglycemia.    Chest heaviness: Patient notes he felt some chest heaviness when he was out of metoprolol and not taking it for 3 days. That has resolved.      Lab Results   Component Value Date    A1C 7.4 12/15/2020    A1C 9.1 09/22/2020    A1C 7.0 05/07/2020    A1C 7.1 12/10/2019    A1C 8.0 09/10/2019         BP Readings from Last 3 Encounters:   12/15/20 (!) 147/75   09/22/20 137/69   07/16/20 125/74         Today's  Vitals: There were no vitals taken for this visit.     ASSESSMENT:                            Medication Adherence/Access: No issues identified    DM: Uncontrolled- Patient is not meeting his A1C goal of <7%, and fasting blood glucose goal of <130.  Even though the patient did not report any low blood glucose readings, there is still a concern of hypoglycemia. Since he is fasting while observing Ramadan, we are more worried about hypoglycemia than hyperglycemia. Patient now feels he can decrease his basal insulin to once daily and plans to make dietary changes. Agreed with decreasing his lantus to once daily and agreed with patient's desire to take 19 units daily. Patient will also benefit from reinforcement about how to correct low blood sugars, the impact of fruits and juices on blood glucose, and the need to monitor blood glucose closely during Ramadan.    Chest Heaviness: Resolved    PLAN:                            1. Decrease insulin to 19 units daily.  2. Break fast if blood sugars are less than 70, and also let us know if readings are below 70 so that insulin can be adjusted more.  3. Check your blood sugar right before Iftar. We will like to see how low your blood glucose gets before you eat.  4. Limit juice, (or avoid all together) and also watch portions of sugary fruit like ruddy.    Follow-up: In a week  ----------------    I spent 38 minutes with this patient today. A copy of the visit note was provided to the patient's provider.    A summary of these recommendations is available via [facility]  ===================    Attestation Statement:    I was present with the pharmacy student who participated in the service and in the documentation of this note. I have verified the history, personally performed the medical decision making, and have verified the content of the note, which accurately reflects my assessment of the patient and the plan of care.     Lenora Mo  PharmD.    ===================      Telemedicine Visit Details  Type of service:  Telephone visit  Start Time: 10:02 AM  End Time: 10:40 AM  Originating Location (patient location): Home  Distant Location (provider location):  [facility] [employer]

## 2021-04-29 NOTE — PROGRESS NOTES
"Telephone/video visits are billed at different rates depending on your insurance coverage. During this emergency period, for some insurers they may be billed the same as an in-person visit.  Please reach out to your insurance provider with any questions.  If during the course of the call the physician/provider feels a telephone visit is not appropriate, you will not be charged for this service.    SUBJECTIVE: Phani is a 61 year old male who was referred by *** for MTM services for ***.        Environmental factors that impact patient:     Patient reports being {COMPLIANCE:5303::\"compliant most of the time\"}     Review of Potential Medication Side Effects:      Patient Active Problem List   Diagnosis     Hypercholesteremia     CARDIOVASCULAR SCREENING; LDL GOAL LESS THAN 100     Hypertension goal BP (blood pressure) < 130/80     Type 2 diabetes, HbA1C goal < 8% (H)     Advanced directives, counseling/discussion     Impingement syndrome, shoulder     Pain in joint, shoulder region     Type 2 diabetes mellitus without complication (H)     Vitamin D deficiency        OBJECTIVE:     SAURAV-7 SCORE 11/20/2018 6/11/2019   Total Score 6 5       PHQ-9 SCORE 11/20/2018 6/11/2019   PHQ-9 Total Score 4 8         VITALS:  BP Readings from Last 3 Encounters:   12/15/20 (!) 147/75   09/22/20 137/69   07/16/20 125/74           Weight:   Wt Readings from Last 1 Encounters:   12/15/20 81 kg (178 lb 9.6 oz)       Height:   Ht Readings from Last 1 Encounters:   12/15/20 1.722 m (5' 7.8\")       LABORATORY VALUES:   Last A1C:    Lab Results   Component Value Date    A1C 7.4 12/15/2020   .    Last Basic Metabolic Panel:  Lab Results   Component Value Date     09/22/2020      Lab Results   Component Value Date    POTASSIUM 4.3 09/22/2020     Lab Results   Component Value Date    CHLORIDE 106 09/22/2020     Lab Results   Component Value Date    LIU 9.8 09/22/2020     Lab Results   Component Value Date    CO2 24 09/22/2020     Lab Results "   Component Value Date    BUN 18 09/22/2020     Lab Results   Component Value Date    CR 0.99 09/22/2020     Lab Results   Component Value Date     09/22/2020       Lipid Panel Labs  Lab Results   Component Value Date    CHOL 164 09/22/2020    CHOL 148 03/12/2019     Lab Results   Component Value Date    TRIG 207 09/22/2020    TRIG 102 03/12/2019     Lab Results   Component Value Date    HDL 37 09/22/2020    HDL 38 03/12/2019     Lab Results   Component Value Date    LDL 86 09/22/2020    LDL 90 03/12/2019       Hepatic Panel Labs  Lab Results   Component Value Date    AST 10 09/22/2020     Lab Results   Component Value Date    ALT 19 09/22/2020         SOCIAL AND FAMILY HISTORY  Social History     Tobacco Use     Smoking status: Never Smoker     Smokeless tobacco: Former User     Types: Snuff   Substance Use Topics     Alcohol use: No    .  Most Recent Immunizations   Administered Date(s) Administered     DTAP (<7y) 05/13/2003     DTaP, Unspecified 12/23/2011     FLU 6-35 months 12/09/2004     Flu, Unspecified 12/09/2004     Influenza (IIV3) PF 12/23/2011     Influenza Vaccine IM > 6 months Valent IIV4 11/01/2019     MMR 05/13/2003     Pneumococcal 23 valent 12/23/2011     TDAP Vaccine (Adacel) 12/23/2011     Td (Adult), Adsorbed 05/13/2003     Tdap (Adult) Unspecified Formulation 12/23/2011       CURRENT MEDICATIONS:   Current Outpatient Medications   Medication Sig Dispense Refill     amLODIPine (NORVASC) 10 MG tablet Take 1 tablet (10 mg) by mouth daily 90 tablet 1     aspirin (ASA) 81 MG chewable tablet Take 1 tablet (81 mg) by mouth daily 100 tablet 2     atorvastatin (LIPITOR) 40 MG tablet Take 1 tablet (40 mg) by mouth daily 90 tablet 1     blood glucose (NO BRAND SPECIFIED) test strip Test twice daily. 2 Box 4     blood glucose (NO BRAND SPECIFIED) test strip Use to test blood sugar 2 times daily or as directed. 100 each 1     blood glucose monitoring (ACCU-CHEK COMPACT CARE KIT) meter device kit Use  to test blood sugars 1-2 times daily or as directed. 1 kit 0     blood glucose monitoring (ACCU-CHEK MULTICLIX) lancets Use to test blood sugar 1-2 times daily or as directed. 102 each 10     Blood Pressure Monitoring (PREMIUM AUTOMATIC BP MONITOR) CHIARA 1 Device daily 1 each 0     famotidine (PEPCID) 20 MG tablet Take 1 tablet (20 mg) by mouth 2 times daily 60 tablet 11     gabapentin (NEURONTIN) 100 MG capsule Take 1 capsule (100 mg) by mouth 3 times daily 90 capsule 2     glucose (BD GLUCOSE) 5 g chewable tablet Take 2 tablets (10 g) by mouth as needed (prn blood sugar < 70) 15 tablet 0     guaiFENesin-dextromethorphan (ROBITUSSIN DM) 100-10 MG/5ML syrup Take 5 mLs by mouth every 4 hours as needed for cough 118 mL 0     insulin glargine (LANTUS PEN) 100 UNIT/ML pen Inject 19 Units Subcutaneous 2 times daily 15 mL 3     insulin pen needle (31G X 8 MM) 31G X 8 MM miscellaneous Use to inject insulin twice daily and Victoza once daily 100 each 11     liraglutide (VICTOZA PEN) 18 MG/3ML solution Inject 1.2 mg Subcutaneous daily 6 mL 3     lisinopril (ZESTRIL) 40 MG tablet Take 1 tablet (40 mg) by mouth daily 90 tablet 1     metFORMIN (GLUCOPHAGE) 500 MG tablet Take 2 tablets (1,000 mg) by mouth 2 times daily (with meals) 120 tablet 2     metoprolol tartrate (LOPRESSOR) 100 MG tablet Take 1 tablet (100 mg) by mouth 2 times daily 60 tablet 5     nitroGLYcerin (NITROSTAT) 0.4 MG sublingual tablet PLEASE SEE ATTACHED FOR DETAILED DIRECTIONS 25 tablet 0     Vitamin D3 (CHOLECALCIFEROL) 25 mcg (1000 units) tablet Take 1 tablet (25 mcg) by mouth daily 100 tablet 3       ALLERGIES:     Allergies   Allergen Reactions     Latex Rash     Latex           ASSESSMENT:    ***         All medications were reviewed and found to be indicated, effective, safe and convenient unless drug therapy problem identified as described above.    PLAN:     ***    Options for treatment and/or follow-up care were reviewed with the patient. Phani GARCIA  Jumana Jeronimo was engaged and actively involved in the decision making process. He/She verbalized understanding of the options discussed and was satisfied with the final plan.    Patient was provided with written instructions/medication list via AVS.       Medical conditions reviewed:     Medications reviewed:     MTP identified:     Time spent:     Level of service:

## 2021-04-29 NOTE — Clinical Note
Phani's blood sugars are stable now.  He still has some higher numbers.  However, he has been sleeping past his first meal of the day at times and so I'm even more cautious about hypoglycemia.  He will more closely monitor BG.  He sees you next week for an A1c and check in on the chest discomfort he was having.  I have a call with him on May 13 where we'll plan his antiglycemic medications post-Ramadan.

## 2021-04-29 NOTE — PROGRESS NOTES
Telephone/video visits are billed at different rates depending on your insurance coverage. During this emergency period, for some insurers they may be billed the same as an in-person visit.  Please reach out to your insurance provider with any questions.  If during the course of the call the physician/provider feels a telephone visit is not appropriate, you will not be charged for this service.    SUBJECTIVE: Phani is a 61 year old male who was referred by Leah Ford for MTM services. Today's visit is a follow-up MTM visit from 4/22/21      DM: Taking Metformin 1000 mg BID, Liraglutide 1.2 mg daily and, Lantus 19 units daily.  Takes his medications after Iftar (meal to break fast) and at suhoor (meal before fasting). Sometimes he does not wake up to eat Suhoor (around 4 am), which has happened three times since the start of Ramadan.  He checks morning and pre-iftar blood glucose levels daily; Fasting blood glucose (11-12pm) ranges between 130-200, and pre iftar blood glucose (6-7pm) ranges between . He denies signs/symptoms of hypoglycemia.    Previous chest discomfort: When asked, Phani notes that he has not had any chest discomfort in the past week (last week he noted chest discomfort when he stopped taking metoprolol for 4 days).    Environmental factors that impact patient: Patient is observing Ramadan      Patient Active Problem List   Diagnosis     Hypercholesteremia     CARDIOVASCULAR SCREENING; LDL GOAL LESS THAN 100     Hypertension goal BP (blood pressure) < 130/80     Type 2 diabetes, HbA1C goal < 8% (H)     Advanced directives, counseling/discussion     Impingement syndrome, shoulder     Pain in joint, shoulder region     Type 2 diabetes mellitus without complication (H)     Vitamin D deficiency        OBJECTIVE:     SAURAV-7 SCORE 11/20/2018 6/11/2019   Total Score 6 5       PHQ-9 SCORE 11/20/2018 6/11/2019   PHQ-9 Total Score 4 8         VITALS:  BP Readings from Last 3 Encounters:   12/15/20 (!)  "147/75   09/22/20 137/69   07/16/20 125/74           Weight:   Wt Readings from Last 1 Encounters:   12/15/20 81 kg (178 lb 9.6 oz)       Height:   Ht Readings from Last 1 Encounters:   12/15/20 1.722 m (5' 7.8\")       LABORATORY VALUES:   Last A1C:    Lab Results   Component Value Date    A1C 7.4 12/15/2020   .    Last Basic Metabolic Panel:  Lab Results   Component Value Date     09/22/2020      Lab Results   Component Value Date    POTASSIUM 4.3 09/22/2020     Lab Results   Component Value Date    CHLORIDE 106 09/22/2020     Lab Results   Component Value Date    LIU 9.8 09/22/2020     Lab Results   Component Value Date    CO2 24 09/22/2020     Lab Results   Component Value Date    BUN 18 09/22/2020     Lab Results   Component Value Date    CR 0.99 09/22/2020     Lab Results   Component Value Date     09/22/2020       Lipid Panel Labs  Lab Results   Component Value Date    CHOL 164 09/22/2020    CHOL 148 03/12/2019     Lab Results   Component Value Date    TRIG 207 09/22/2020    TRIG 102 03/12/2019     Lab Results   Component Value Date    HDL 37 09/22/2020    HDL 38 03/12/2019     Lab Results   Component Value Date    LDL 86 09/22/2020    LDL 90 03/12/2019       Hepatic Panel Labs  Lab Results   Component Value Date    AST 10 09/22/2020     Lab Results   Component Value Date    ALT 19 09/22/2020         SOCIAL AND FAMILY HISTORY  Social History     Tobacco Use     Smoking status: Never Smoker     Smokeless tobacco: Former User     Types: Snuff   Substance Use Topics     Alcohol use: No    .  Most Recent Immunizations   Administered Date(s) Administered     DTAP (<7y) 05/13/2003     DTaP, Unspecified 12/23/2011     FLU 6-35 months 12/09/2004     Flu, Unspecified 12/09/2004     Influenza (IIV3) PF 12/23/2011     Influenza Vaccine IM > 6 months Valent IIV4 11/01/2019     MMR 05/13/2003     Pneumococcal 23 valent 12/23/2011     TDAP Vaccine (Adacel) 12/23/2011     Td (Adult), Adsorbed 05/13/2003     Tdap " (Adult) Unspecified Formulation 12/23/2011       CURRENT MEDICATIONS:   Current Outpatient Medications   Medication Sig Dispense Refill     amLODIPine (NORVASC) 10 MG tablet Take 1 tablet (10 mg) by mouth daily 90 tablet 1     aspirin (ASA) 81 MG chewable tablet Take 1 tablet (81 mg) by mouth daily 100 tablet 2     atorvastatin (LIPITOR) 40 MG tablet Take 1 tablet (40 mg) by mouth daily 90 tablet 1     blood glucose (NO BRAND SPECIFIED) test strip Test twice daily. 2 Box 4     blood glucose (NO BRAND SPECIFIED) test strip Use to test blood sugar 2 times daily or as directed. 100 each 1     blood glucose monitoring (ACCU-CHEK COMPACT CARE KIT) meter device kit Use to test blood sugars 1-2 times daily or as directed. 1 kit 0     blood glucose monitoring (ACCU-CHEK MULTICLIX) lancets Use to test blood sugar 1-2 times daily or as directed. 102 each 10     Blood Pressure Monitoring (PREMIUM AUTOMATIC BP MONITOR) CHIARA 1 Device daily 1 each 0     famotidine (PEPCID) 20 MG tablet Take 1 tablet (20 mg) by mouth 2 times daily 60 tablet 11     gabapentin (NEURONTIN) 100 MG capsule Take 1 capsule (100 mg) by mouth 3 times daily 90 capsule 2     glucose (BD GLUCOSE) 5 g chewable tablet Take 2 tablets (10 g) by mouth as needed (prn blood sugar < 70) 15 tablet 0     guaiFENesin-dextromethorphan (ROBITUSSIN DM) 100-10 MG/5ML syrup Take 5 mLs by mouth every 4 hours as needed for cough 118 mL 0     insulin glargine (LANTUS PEN) 100 UNIT/ML pen Inject 19 Units Subcutaneous 2 times daily 15 mL 3     insulin pen needle (31G X 8 MM) 31G X 8 MM miscellaneous Use to inject insulin twice daily and Victoza once daily 100 each 11     liraglutide (VICTOZA PEN) 18 MG/3ML solution Inject 1.2 mg Subcutaneous daily 6 mL 3     lisinopril (ZESTRIL) 40 MG tablet Take 1 tablet (40 mg) by mouth daily 90 tablet 1     metFORMIN (GLUCOPHAGE) 500 MG tablet Take 2 tablets (1,000 mg) by mouth 2 times daily (with meals) 120 tablet 2     metoprolol tartrate  (LOPRESSOR) 100 MG tablet Take 1 tablet (100 mg) by mouth 2 times daily 60 tablet 5     nitroGLYcerin (NITROSTAT) 0.4 MG sublingual tablet PLEASE SEE ATTACHED FOR DETAILED DIRECTIONS 25 tablet 0     Vitamin D3 (CHOLECALCIFEROL) 25 mcg (1000 units) tablet Take 1 tablet (25 mcg) by mouth daily 100 tablet 3       ALLERGIES:     Allergies   Allergen Reactions     Latex Rash     Latex           ASSESSMENT:    DM: Uncontrolled- Patient is not meeting his A1C goal of <7%, and fasting blood glucose goal of <130.  Even though the patient did not report any low blood glucose readings today, there is still a concern of hypoglycemia, especially after hearing that he occasionally misses Suhoor (meal before fasting) . During Ramadan, we are more worried about hypoglycemia than hyperglycemia.  MTP: Needs additional moitoring    All medications were reviewed and found to be indicated, effective, safe and convenient unless drug therapy problem identified as described above.    PLAN:     1. Continue taking Lantus 19 units daily, Metformin 1000 mg BID and Liraglutide 1.2 mg daily.  2. On the days you miss Suhoor, check your blood sugar twice before Iftar at 6 pm and 7:30 pm  3.Break fast if blood sugars are less than 70, and also let us know if readings are below 70 so that insulin can be adjusted more.    Follow up: With PCP for A1c next week and with this writer In 2 weeks (for medication planning post-Ramadan)    Options for treatment and/or follow-up care were reviewed with the patient. Phani Jeronimo was engaged and actively involved in the decision making process. He/She verbalized understanding of the options discussed and was satisfied with the final plan.    Written instructions available via AVS.     Sharon Huffman IV pharmacy student    ===================    Attestation Statement:    I have reviewed and agree with the pharmacy assessment and plan presented.    Lenora Mo, PharmD.    ===================        Medical  conditions reviewed:  1    Medications reviewed: 13    MTP identified: 1    Time spent: 30 minutes    Level of service: 2

## 2021-04-29 NOTE — PATIENT INSTRUCTIONS
1. Continue taking Lantus 19 units daily, Metformin 1000 mg BID and Liraglutide 1.2 mg daily.  2. On the days you miss Mk, check your blood sugar twice before Iftar at 6 pm and 7:30 pm  3.Break fast if blood sugars are less than 70, and also let us know if readings are below 70 so that insulin can be adjusted more.

## 2021-05-04 PROBLEM — R19.7 INTERMITTENT DIARRHEA: Status: ACTIVE | Noted: 2021-01-01

## 2021-05-04 NOTE — LETTER
May 6, 2021      Phani JOSE Jeronimo  [address]   [facility] [address]        Dear Mr.Mohamed Jeronimo,    We are writing to inform you of your test results.    Test results indicate you may require additional follow up, see comment below.    Resulted Orders   Hemoglobin A1c ([facility])   Result Value Ref Range    Hemoglobin A1C 7.6 (H) 4.1 - 5.7 %   Basic metabolic panel   Result Value Ref Range    Sodium 137 133 - 144 mmol/L    Potassium 4.6 3.4 - 5.3 mmol/L    Chloride 109 94 - 109 mmol/L    Carbon Dioxide 21 20 - 32 mmol/L    Anion Gap 7 3 - 14 mmol/L    Glucose 185 (H) 70 - 99 mg/dL    Urea Nitrogen 23 7 - 30 mg/dL    Creatinine 1.05 0.66 - 1.25 mg/dL    GFR Estimate 75 >60 mL/min/[1.73_m2]      Comment:      Non  GFR Calc  Starting 12/18/2018, serum creatinine based estimated GFR (eGFR) will be   calculated using the Chronic Kidney Disease Epidemiology Collaboration   (CKD-EPI) equation.      GFR Estimate If Black 87 >60 mL/min/[1.73_m2]      Comment:       GFR Calc  Starting 12/18/2018, serum creatinine based estimated GFR (eGFR) will be   calculated using the Chronic Kidney Disease Epidemiology Collaboration   (CKD-EPI) equation.      Calcium 9.8 8.5 - 10.1 mg/dL   Albumin Random Urine Quantitative with Creat Ratio   Result Value Ref Range    Creatinine Urine 196 mg/dL    Albumin Urine mg/L 26 mg/L    Albumin Urine mg/g Cr 13.01 0 - 17 mg/g Cr   Magnesium   Result Value Ref Range    Magnesium 1.8 1.6 - 2.3 mg/dL   CBC with platelets   Result Value Ref Range    WBC 10.8 4.0 - 11.0 10e9/L    RBC Count 4.95 4.4 - 5.9 10e12/L    Hemoglobin 14.3 13.3 - 17.7 g/dL    Hematocrit 43.8 40.0 - 53.0 %    MCV 89 78 - 100 fl    MCH 28.9 26.5 - 33.0 pg    MCHC 32.6 31.5 - 36.5 g/dL    RDW 13.9 10.0 - 15.0 %    Platelet Count 320 150 - 450 10e9/L     Virgil Jeronimo, here are your lab results. Your glucose and 3 month blood sugar average are elevated, the 3 month blood sugar  average is higher than previous, so getting back on track with blood sugars is important.  We should recheck in 3 months, Your goal blood sugar is 110-150. Your kidney function is stable. Your complete blood count is within normal limits currently. If you develop fever, do return. I am glad to hear you got your COVID vaccine.     If you have any questions or concerns, please call the clinic at the number listed above.       Sincerely,      RICARDO De Luna CNP

## 2021-05-04 NOTE — PROGRESS NOTES
HPI       Phani GARCIA Jumana Jeronimo is a 61 year old  who presents for   Chief Complaint   Patient presents with     Diabetes   Follow up of DM 2. Currently in Ramadan which has disrupted BS control to some extent. Was adjusting insulin therapy. Recently met with Pharm D and medications reviewed. Is following recommendations. Denies symptoms of hypoglycemia.     BS Log review  Am: 130-140; rare 200  NICK:  before dinner.  Eating around 8pm during Ramadan  8 days until Ramadan ends.   Diet: light meal in early am before sunrise, large meal in evening breaks fast. Has noted reduced appetite. Drinks orange juice mixed with diet juice  Energy level is good. Weight has been stable. Feels better since not eating so much. Feels abdomen has gotten smaller.     Had 1 episode of chest pain and heaviness 1 week before Ramadan started when ran out of Metoprolol for 4 days.  Describes as heaviness in chest with SOB.Took NTG x1 which relieved chest discomfort. Has not experienced since then    Also had diarrhea intermittently for 2 months prior to Ramadan. Had continuously for 2 weeks prior to Ramadan.  This coincided with chest discomfort.    No blood in stool, just liquid. This resolved with Ramadan start and eating less.  Reviewed history, cannot find colonoscopy history.  Has had regular FIT tests (negative)      Problem, Medication and Allergy Lists were reviewed and updated if needed..    Patient is an established patient of this clinic. Reviewed and updated histories as needed.           Review of Systems:   Review of Systems  GEN: reduced appetite, weight stable, good energy. Denies fever.   RESP: SOB with episode of CP, none since then  CV: as per HPI. Denies irregular heart beat or peripheral edema  GI: heartburn off and on, takes famotidine when he needs it, 2-3 times per week. Better during Ramadan. On RAHEEM 81mg daily. Diarrhea as per HPI  ENDO: BS as above.   MSK: Occasional foot cramps but better since started  "Gabapentin  NEURO: Continues to have burning in feet, but otherwise sensation improved and not getting pins and needles or numbness in extremities.       Physical Exam:     Vitals:    05/04/21 1049   BP: 137/72   Pulse: 88   Temp: 97.7  F (36.5  C)   TempSrc: Oral   SpO2: 93%   Weight: 76.7 kg (169 lb 1.6 oz)   Height: 1.717 m (5' 7.6\")     Body mass index is 26.02 kg/m .  Vitals were reviewed and were normal     Physical Exam  GEN: alert talkative male in NAD  NECK: Thyroid smooth and not enlarged  CV: HRRR, S1, S2, no MRG. Negative peripheral edema. Pedal pulses +2 bilaterally. EKG NSR  RESP: Lungs clear to auscultation with air entry throughout  ABD: BSx4, soft rounded, nontender, no masses or HSM  NEURO: Sensation intact feet bilaterally to monofilament  DERM: skin intact, no rash; some thickening of toenails, callouses on heels.     Results:   Results from this visit:  A1c drawn and pending  Other labs pending    Assessment and Plan      Chest pain while off Metoprolol  Discussed importance of maintaining metoprolol and risk of rebound chest pain.  He understands. Refilled metoprolol today  EKG today given recent chest pain episode. No changes, NSR.  Refilled amlodipine, lisinopril, atorvastatin. Verified is taking ASA daily    Diabetes Mellitus Type 2  Labs: A1c, BMP, microalbumin  Reviewed all medications. Refilled Metformin and liraglutide. Has enough insulin, needles, test strips and lancets  Reviewed signs and symptoms of hypoglyemia. Goal -140  Foot exam completed: Sensation intact; burning persists but pain gone since started Gabapentin. Discussed could go up on gabapentin.   Return to clinic 3 months    Heartburn  Refilled famotidine  Recheck H pylori; uncertain if this has been checked in past  CBC today    History intermittent diarrhea for 2 months prior to Ramadan  Check FIT today. Discussed colonoscopy.   CBC as above.   Monitor for symptoms, if recurs after Ramadan, return.     Foot cramps " and borderline Mg in past  Recheck Magnesium level today    Do B12 and Folate with next blood draw as MA already vincent labs today when these were ordered    Recommend COVID Vaccine. We will help him schedule this.      There are no discontinued medications.    Options for treatment and follow-up care were reviewed with the patient. Phani Jeronimo  engaged in the decision making process and verbalized understanding of the options discussed and agreed with the final plan.    RICARDO De Luna CNP

## 2021-05-04 NOTE — PATIENT INSTRUCTIONS
Chest pain while off Metoprolol  Discussed importance of maintaining metoprolol and risk of rebound chest pain.  He understands. Refilled metoprolol today  EKG: Normal sinus rhythm; no changes from previous  Refilled amlodipine, lisinopril, atorvastatin. Verified is taking ASA daily    Diabetes Mellitus Type 2  Labs: A1c, BMP  Reviewed all medications. Refilled Metformin and liraglutide. Has enough insulin, needles, test strips and lancets  Reviewed signs and symptoms of hypoglyemia. Goal -140  Foot exam completed: Sensation intact; burning persists but pain gone since started Gabapentin. Discussed could go up on gabapentin.     Heartburn  Refilled famotidine  Recheck H pylori; uncertain if this has been checked in past  CBC today    History intermittent diarrhea for 2 months prior to Ramadan  Check FIT today. Discussed colonoscopy.   CBC as above.   Monitor for symptoms, if recurs after Ramadan, return.     Foot cramps and borderline Mg in past  Recheck Magnesium level today    Do B12 and Folate with next blood draw as MA already vincent labs today when these were ordered    Follow up for Diabetes in 3 months  Let us know if need refills or have pharmacy contact us    Obtrain COVID vaccine: we will help you set that up

## 2021-05-04 NOTE — NURSING NOTE
"ROOM:2    61 year old  Chief Complaint   Patient presents with     Diabetes       Blood pressure 137/72, pulse 88, temperature 97.7  F (36.5  C), temperature source Oral, height 1.717 m (5' 7.6\"), weight 76.7 kg (169 lb 1.6 oz), SpO2 93 %. Body mass index is 26.02 kg/m .      Patient Active Problem List   Diagnosis     Hypercholesteremia     CARDIOVASCULAR SCREENING; LDL GOAL LESS THAN 100     Hypertension goal BP (blood pressure) < 130/80     Type 2 diabetes, HbA1C goal < 8% (H)     Advanced directives, counseling/discussion     Impingement syndrome, shoulder     Pain in joint, shoulder region     Type 2 diabetes mellitus without complication (H)     Vitamin D deficiency       Wt Readings from Last 2 Encounters:   05/04/21 76.7 kg (169 lb 1.6 oz)   12/15/20 81 kg (178 lb 9.6 oz)     BP Readings from Last 3 Encounters:   05/04/21 137/72   12/15/20 (!) 147/75   09/22/20 137/69       Allergies   Allergen Reactions     Latex Rash     Latex        Current Outpatient Medications   Medication     amLODIPine (NORVASC) 10 MG tablet     aspirin (ASA) 81 MG chewable tablet     atorvastatin (LIPITOR) 40 MG tablet     blood glucose (NO BRAND SPECIFIED) test strip     blood glucose (NO BRAND SPECIFIED) test strip     blood glucose monitoring (ACCU-CHEK COMPACT CARE KIT) meter device kit     blood glucose monitoring (ACCU-CHEK MULTICLIX) lancets     famotidine (PEPCID) 20 MG tablet     gabapentin (NEURONTIN) 100 MG capsule     glucose (BD GLUCOSE) 5 g chewable tablet     insulin glargine (LANTUS PEN) 100 UNIT/ML pen     insulin pen needle (31G X 8 MM) 31G X 8 MM miscellaneous     liraglutide (VICTOZA PEN) 18 MG/3ML solution     lisinopril (ZESTRIL) 40 MG tablet     metoprolol tartrate (LOPRESSOR) 100 MG tablet     nitroGLYcerin (NITROSTAT) 0.4 MG sublingual tablet     Vitamin D3 (CHOLECALCIFEROL) 25 mcg (1000 units) tablet     Blood Pressure Monitoring (PREMIUM AUTOMATIC BP MONITOR) CHIARA     guaiFENesin-dextromethorphan " (ROBITUSSIN DM) 100-10 MG/5ML syrup     metFORMIN (GLUCOPHAGE) 500 MG tablet     No current facility-administered medications for this visit.        Social History     Tobacco Use     Smoking status: Never Smoker     Smokeless tobacco: Former User     Types: Snuff   Substance Use Topics     Alcohol use: No     Drug use: No       Honoring Choices - Health Care Directive Guide offered to patient at time of visit.    Health Maintenance Due   Topic Date Due     HIV SCREENING  Never done     COVID-19 Vaccine (1) Never done     HEPATITIS C SCREENING  Never done     ZOSTER IMMUNIZATION (1 of 2) Never done     PREVENTIVE CARE VISIT  03/12/2014     EYE EXAM  06/20/2015     ADVANCE CARE PLANNING  03/12/2018     PHQ-2  01/01/2021     COLORECTAL CANCER SCREENING  01/10/2021       Immunization History   Administered Date(s) Administered     DTAP (<7y) 04/02/2003, 05/13/2003     DTaP, Unspecified 12/23/2011     FLU 6-35 months 12/09/2004     Flu, Unspecified 12/09/2004     Influenza (IIV3) PF 12/23/2011     Influenza Vaccine IM > 6 months Valent IIV4 12/04/2018, 11/01/2019     MMR 04/02/2003, 05/13/2003     Pneumococcal 23 valent 12/23/2011     TDAP Vaccine (Adacel) 12/23/2011     Td (Adult), Adsorbed 04/02/2003, 05/13/2003     Tdap (Adult) Unspecified Formulation 12/23/2011       No results found for: PAP      Recent Labs   Lab Test 12/15/20  1415 09/22/20  1400 09/22/20  1320 05/07/20  1428 06/11/19  1300 06/11/19  1300 03/12/19  1157 03/12/19  1157 11/20/18  1426 11/20/18  1426 06/06/14  0945   A1C 7.4* 9.1*  --  7.0*   < >  --    < >  --    < >  --  8.8*   LDL  --   --  86  --   --   --   --  90  --   --  174*   HDL  --   --  37*  --   --   --   --  38*  --   --  31*   TRIG  --   --  207*  --   --   --   --  102  --   --  247*   ALT  --   --  19  --   --   --   --   --   --  24 28   CR  --   --  0.99  --   --  0.99  --   --   --  0.88 0.83   GFRESTIMATED  --   --  81  --   --  81  --   --   --  88 >90   GFRESTBLACK  --   --   >90  --   --  >90  --   --   --  >90 >90   ALBUMIN  --   --  4.0  --   --   --   --   --   --  4.3 4.1   POTASSIUM  --   --  4.3  --   --  4.3  --   --   --  4.5 4.4   TSH  --   --   --   --   --  1.35  --   --   --  1.83  --     < > = values in this interval not displayed.       PHQ-2 ( 1999 Pfizer) 6/11/2019 12/4/2018   Q1: Little interest or pleasure in doing things 2 0   Q2: Feeling down, depressed or hopeless 1 0   PHQ-2 Score 3 0       PHQ-9 SCORE 11/20/2018 6/11/2019   PHQ-9 Total Score 4 8       SAURAV-7 SCORE 11/20/2018 6/11/2019   Total Score 6 5       No flowsheet data found.      Jolene Savage, [facility]  May 4, 2021 10:51 AM

## 2021-05-13 NOTE — PATIENT INSTRUCTIONS
- Resume insulin at a slightly lower dose than you were taking pre-Ramadan.  Take 16 units twice a day.   - Take blood sugar fasting every morning. If for three days in a row blood sugar is above 130, then increase units taken in the morning and evening by 1 unit (for instance, next increase will be to 17 units twice daily).   - Keep monitoring blood sugar in the morning and increasing by 1 unit twice daily when fasting blood sugar is above 130 for three days in a row.  - Follow up over phone in one week

## 2021-05-13 NOTE — PROGRESS NOTES
"Telephone/video visits are billed at different rates depending on your insurance coverage. During this emergency period, for some insurers they may be billed the same as an in-person visit.  Please reach out to your insurance provider with any questions.  If during the course of the call the physician/provider feels a telephone visit is not appropriate, you will not be charged for this service.    SUBJECTIVE: Phani is a 61 year old male who was referred by Leah Ford for [facility] services for diabetes management.      DM: Now that Ramadan is finished, Phani is planning to take his insulin two times a day. He is willing to adjust his insulin based on his blood sugar readings.     Environmental factors that impact patient: Ramadan is now completed. Phani is now eating normally.       Patient Active Problem List   Diagnosis     Hypercholesteremia     CARDIOVASCULAR SCREENING; LDL GOAL LESS THAN 100     Hypertension goal BP (blood pressure) < 130/80     Type 2 diabetes, HbA1C goal < 8% (H)     Advanced directives, counseling/discussion     Impingement syndrome, shoulder     Pain in joint, shoulder region     Type 2 diabetes mellitus without complication (H)     Vitamin D deficiency     Intermittent diarrhea        OBJECTIVE:     SAURAV-7 SCORE 11/20/2018 6/11/2019   Total Score 6 5       PHQ-9 SCORE 11/20/2018 6/11/2019   PHQ-9 Total Score 4 8         VITALS:  BP Readings from Last 3 Encounters:   05/04/21 137/72   12/15/20 (!) 147/75   09/22/20 137/69           Weight:   Wt Readings from Last 1 Encounters:   05/04/21 76.7 kg (169 lb 1.6 oz)       Height:   Ht Readings from Last 1 Encounters:   05/04/21 1.717 m (5' 7.6\")       LABORATORY VALUES:   Last A1C:    Lab Results   Component Value Date    A1C 7.6 05/04/2021   .    Last Basic Metabolic Panel:  Lab Results   Component Value Date     05/04/2021      Lab Results   Component Value Date    POTASSIUM 4.6 05/04/2021     Lab Results   Component Value Date    CHLORIDE 109 " 05/04/2021     Lab Results   Component Value Date    LIU 9.8 05/04/2021     Lab Results   Component Value Date    CO2 21 05/04/2021     Lab Results   Component Value Date    BUN 23 05/04/2021     Lab Results   Component Value Date    CR 1.05 05/04/2021     Lab Results   Component Value Date     05/04/2021       Lipid Panel Labs  Lab Results   Component Value Date    CHOL 164 09/22/2020    CHOL 148 03/12/2019     Lab Results   Component Value Date    TRIG 207 09/22/2020    TRIG 102 03/12/2019     Lab Results   Component Value Date    HDL 37 09/22/2020    HDL 38 03/12/2019     Lab Results   Component Value Date    LDL 86 09/22/2020    LDL 90 03/12/2019       Hepatic Panel Labs  Lab Results   Component Value Date    AST 10 09/22/2020     Lab Results   Component Value Date    ALT 19 09/22/2020         SOCIAL AND FAMILY HISTORY  Social History     Tobacco Use     Smoking status: Never Smoker     Smokeless tobacco: Former User     Types: Snuff   Substance Use Topics     Alcohol use: No    .  Most Recent Immunizations   Administered Date(s) Administered     COVID-19,PF,Pfizer 05/04/2021     DTAP (<7y) 05/13/2003     DTaP, Unspecified 12/23/2011     FLU 6-35 months 12/09/2004     Flu, Unspecified 12/09/2004     Influenza (IIV3) PF 12/23/2011     Influenza Vaccine IM > 6 months Valent IIV4 11/01/2019     MMR 05/13/2003     Pneumococcal 23 valent 12/23/2011     TDAP Vaccine (Adacel) 12/23/2011     Td (Adult), Adsorbed 05/13/2003     Tdap (Adult) Unspecified Formulation 12/23/2011       CURRENT MEDICATIONS:   Current Outpatient Medications   Medication Sig Dispense Refill     amLODIPine (NORVASC) 10 MG tablet Take 1 tablet (10 mg) by mouth daily 90 tablet 1     aspirin (ASA) 81 MG chewable tablet Take 1 tablet (81 mg) by mouth daily 100 tablet 2     atorvastatin (LIPITOR) 40 MG tablet Take 1 tablet (40 mg) by mouth daily 90 tablet 1     blood glucose (NO BRAND SPECIFIED) test strip Test twice daily. 2 Box 4     blood  glucose (NO BRAND SPECIFIED) test strip Use to test blood sugar 2 times daily or as directed. 100 each 1     blood glucose monitoring (ACCU-CHEK COMPACT CARE KIT) meter device kit Use to test blood sugars 1-2 times daily or as directed. 1 kit 0     blood glucose monitoring (ACCU-CHEK MULTICLIX) lancets Use to test blood sugar 1-2 times daily or as directed. 102 each 10     Blood Pressure Monitoring (PREMIUM AUTOMATIC BP MONITOR) CHIARA 1 Device daily (Patient not taking: Reported on 5/4/2021) 1 each 0     famotidine (PEPCID) 20 MG tablet Take 1 tablet (20 mg) by mouth 2 times daily 60 tablet 11     gabapentin (NEURONTIN) 100 MG capsule Take 1 capsule (100 mg) by mouth 3 times daily 90 capsule 2     glucose (BD GLUCOSE) 5 g chewable tablet Take 2 tablets (10 g) by mouth as needed (prn blood sugar < 70) 15 tablet 0     insulin glargine (LANTUS PEN) 100 UNIT/ML pen Inject 19 Units Subcutaneous 2 times daily 15 mL 3     insulin pen needle (31G X 8 MM) 31G X 8 MM miscellaneous Use to inject insulin twice daily and Victoza once daily 100 each 11     liraglutide (VICTOZA PEN) 18 MG/3ML solution Inject 1.2 mg Subcutaneous daily 6 mL 3     lisinopril (ZESTRIL) 40 MG tablet Take 1 tablet (40 mg) by mouth daily 90 tablet 1     metFORMIN (GLUCOPHAGE) 500 MG tablet Take 2 tablets (1,000 mg) by mouth 2 times daily (with meals) 120 tablet 2     metoprolol tartrate (LOPRESSOR) 100 MG tablet Take 1 tablet (100 mg) by mouth 2 times daily 60 tablet 5     nitroGLYcerin (NITROSTAT) 0.4 MG sublingual tablet PLEASE SEE ATTACHED FOR DETAILED DIRECTIONS 25 tablet 0     Vitamin D3 (CHOLECALCIFEROL) 25 mcg (1000 units) tablet Take 1 tablet (25 mcg) by mouth daily 100 tablet 3       ALLERGIES:     Allergies   Allergen Reactions     Latex Rash     Latex           ASSESSMENT:    DM: Not at goal based on recent A1c. It will be best to resume insulin conservatively to reduce risk of hypoglycemia.   MTP: Dose too low (insulin)    All medications were  reviewed and found to be indicated, effective, safe and convenient unless drug therapy problem identified as described above.    PLAN:   - Resume insulin at a slightly lower dose than you were taking pre-Ramadan.  Take 16 units twice a day.   - Take blood sugar fasting every morning. If for three days in a row blood sugar is above 130, then increase units taken in the morning and evening by 1 unit (for instance, next increase will be to 17 units twice daily).   - Keep monitoring blood sugar in the morning and increasing by 1 unit twice daily when fasting blood sugar is above 130 for three days in a row.  - Follow up over phone in one week     Options for treatment and/or follow-up care were reviewed with the patient. Phani Jeronimo was engaged and actively involved in the decision making process. He/She verbalized understanding of the options discussed and was satisfied with the final plan.    Patient was provided with written instructions/medication list via AVS.       Medical conditions reviewed: 1    Medications reviewed: 2    MTP identified:  1    Time spent:  20 minutes    Level of service: 2

## 2021-05-20 NOTE — PROGRESS NOTES
"Telephone/video visits are billed at different rates depending on your insurance coverage. During this emergency period, for some insurers they may be billed the same as an in-person visit.  Please reach out to your insurance provider with any questions.  If during the course of the call the physician/provider feels a telephone visit is not appropriate, you will not be charged for this service.    SUBJECTIVE: Phani is a 61 year old male who was referred by Leah Ford for [facility] services for diabetes management. He notes that he is still adjusting since Ramadan has ended.    DM: Phani is taking insulin 19 units twice daily and liraglutide and metformin. Phani notes that his blood glucose has been higher, but he wonders if it is due to his diet. He also notes that yesterday he forgot his evening insulin and metformin    Fastin this morning (attributes to forgetting medicine last night). Otherwise around 100 or 130. He denies signs symptoms of hypoglycemia.      Environmental factors that impact patient: Recently observed Ramadan which impacted his blood sugars.      Patient Active Problem List   Diagnosis     Hypercholesteremia     CARDIOVASCULAR SCREENING; LDL GOAL LESS THAN 100     Hypertension goal BP (blood pressure) < 130/80     Type 2 diabetes, HbA1C goal < 8% (H)     Advanced directives, counseling/discussion     Impingement syndrome, shoulder     Pain in joint, shoulder region     Type 2 diabetes mellitus without complication (H)     Vitamin D deficiency     Intermittent diarrhea        OBJECTIVE:     SAURAV-7 SCORE 2018   Total Score 6 5       PHQ-9 SCORE 2018   PHQ-9 Total Score 4 8         VITALS:  BP Readings from Last 3 Encounters:   21 137/72   12/15/20 (!) 147/75   20 137/69           Weight:   Wt Readings from Last 1 Encounters:   21 76.7 kg (169 lb 1.6 oz)       Height:   Ht Readings from Last 1 Encounters:   21 1.717 m (5' 7.6\")       LABORATORY " VALUES:   Last A1C:    Lab Results   Component Value Date    A1C 7.6 05/04/2021   .    Last Basic Metabolic Panel:  Lab Results   Component Value Date     05/04/2021      Lab Results   Component Value Date    POTASSIUM 4.6 05/04/2021     Lab Results   Component Value Date    CHLORIDE 109 05/04/2021     Lab Results   Component Value Date    LIU 9.8 05/04/2021     Lab Results   Component Value Date    CO2 21 05/04/2021     Lab Results   Component Value Date    BUN 23 05/04/2021     Lab Results   Component Value Date    CR 1.05 05/04/2021     Lab Results   Component Value Date     05/04/2021       Lipid Panel Labs  Lab Results   Component Value Date    CHOL 164 09/22/2020    CHOL 148 03/12/2019     Lab Results   Component Value Date    TRIG 207 09/22/2020    TRIG 102 03/12/2019     Lab Results   Component Value Date    HDL 37 09/22/2020    HDL 38 03/12/2019     Lab Results   Component Value Date    LDL 86 09/22/2020    LDL 90 03/12/2019       Hepatic Panel Labs  Lab Results   Component Value Date    AST 10 09/22/2020     Lab Results   Component Value Date    ALT 19 09/22/2020         SOCIAL AND FAMILY HISTORY  Social History     Tobacco Use     Smoking status: Never Smoker     Smokeless tobacco: Former User     Types: Snuff   Substance Use Topics     Alcohol use: No    .  Most Recent Immunizations   Administered Date(s) Administered     COVID-19,PF,Pfizer 05/04/2021     DTAP (<7y) 05/13/2003     DTaP, Unspecified 12/23/2011     FLU 6-35 months 12/09/2004     Flu, Unspecified 12/09/2004     Influenza (IIV3) PF 12/23/2011     Influenza Vaccine IM > 6 months Valent IIV4 11/01/2019     MMR 05/13/2003     Pneumococcal 23 valent 12/23/2011     TDAP Vaccine (Adacel) 12/23/2011     Td (Adult), Adsorbed 05/13/2003     Tdap (Adult) Unspecified Formulation 12/23/2011       CURRENT MEDICATIONS:   Current Outpatient Medications   Medication Sig Dispense Refill     amLODIPine (NORVASC) 10 MG tablet Take 1 tablet (10 mg)  by mouth daily 90 tablet 1     aspirin (ASA) 81 MG chewable tablet Take 1 tablet (81 mg) by mouth daily 100 tablet 2     atorvastatin (LIPITOR) 40 MG tablet Take 1 tablet (40 mg) by mouth daily 90 tablet 1     blood glucose (NO BRAND SPECIFIED) test strip Test twice daily. 2 Box 4     blood glucose (NO BRAND SPECIFIED) test strip Use to test blood sugar 2 times daily or as directed. 100 each 1     blood glucose monitoring (ACCU-CHEK COMPACT CARE KIT) meter device kit Use to test blood sugars 1-2 times daily or as directed. 1 kit 0     blood glucose monitoring (ACCU-CHEK MULTICLIX) lancets Use to test blood sugar 1-2 times daily or as directed. 102 each 10     Blood Pressure Monitoring (PREMIUM AUTOMATIC BP MONITOR) CHIARA 1 Device daily (Patient not taking: Reported on 5/4/2021) 1 each 0     famotidine (PEPCID) 20 MG tablet Take 1 tablet (20 mg) by mouth 2 times daily 60 tablet 11     gabapentin (NEURONTIN) 100 MG capsule Take 1 capsule (100 mg) by mouth 3 times daily 90 capsule 2     glucose (BD GLUCOSE) 5 g chewable tablet Take 2 tablets (10 g) by mouth as needed (prn blood sugar < 70) 15 tablet 0     insulin glargine (LANTUS PEN) 100 UNIT/ML pen Inject 19 Units Subcutaneous 2 times daily 15 mL 3     insulin pen needle (31G X 8 MM) 31G X 8 MM miscellaneous Use to inject insulin twice daily and Victoza once daily 100 each 11     liraglutide (VICTOZA PEN) 18 MG/3ML solution Inject 1.2 mg Subcutaneous daily 6 mL 3     lisinopril (ZESTRIL) 40 MG tablet Take 1 tablet (40 mg) by mouth daily 90 tablet 1     metFORMIN (GLUCOPHAGE) 500 MG tablet Take 2 tablets (1,000 mg) by mouth 2 times daily (with meals) 120 tablet 2     metoprolol tartrate (LOPRESSOR) 100 MG tablet Take 1 tablet (100 mg) by mouth 2 times daily 60 tablet 5     nitroGLYcerin (NITROSTAT) 0.4 MG sublingual tablet PLEASE SEE ATTACHED FOR DETAILED DIRECTIONS 25 tablet 0     Vitamin D3 (CHOLECALCIFEROL) 25 mcg (1000 units) tablet Take 1 tablet (25 mcg) by mouth  daily 100 tablet 3       ALLERGIES:     Allergies   Allergen Reactions     Latex Rash     Latex           ASSESSMENT:    DM: Generally at goal based on self measured blood glucose.  Fasting goal is .         All medications were reviewed and found to be indicated, effective, safe and convenient unless drug therapy problem identified as described above.    PLAN:     - Continue insulin glargine 19 units twice daily.  - Continue recording fasting blood glucose in the morning  - If fasting readings are outside of the range of , let clinic know  - Follow up in one month    Options for treatment and/or follow-up care were reviewed with the patient. Phani Jeronimo was engaged and actively involved in the decision making process. He/She verbalized understanding of the options discussed and was satisfied with the final plan.    Patient was provided with written instructions/medication list via AVS.       Medical conditions reviewed: 1    Medications reviewed: 3    MTP identified:  0    Time spent: 15 minutes    Level of service: 1

## 2021-07-27 NOTE — TELEPHONE ENCOUNTER
Attempted to call patient since he missed last schedule phone call about one month ago.  Call went directly to voice mail with a message that patient's mail box was not set up yet.

## 2021-09-02 NOTE — TELEPHONE ENCOUNTER
gabapentin (NEURONTIN) 100 MG capsule    Take 1 capsule (100 mg) by mouth 3 times daily     Last Written Prescription Date:  4/20/21  Last Fill Quantity: 90,   # refills: 2  Last Office Visit : 5/4/21  Future Office visit:  none    Routing refill request to provider for review/approval because:  Drug not on the [facility], P or [facility] refill protocol or controlled substance

## 2021-09-28 NOTE — Clinical Note
Virgil Lynn - As I mentioned, I talked to Phani yesterday.  I refilled many of his medications since he was out or soon to be out of them.  When he sees you next week if his A1c is high, could consider empagliflozin (I'm happy to even join the visit remotely or follow up with him soon after the appointment). Also, in addition to his A1c, you might consider a cholesterol panel for him since his last was about a year ago.  Let me know how I can help!  - Lenora

## 2021-09-28 NOTE — PROGRESS NOTES
Telephone/video visits are billed at different rates depending on your insurance coverage. During this emergency period, for some insurers they may be billed the same as an in-person visit.  Please reach out to your insurance provider with any questions.  If during the course of the call the physician/provider feels a telephone visit is not appropriate, you will not be charged for this service.    SUBJECTIVE: Phani is a 61 year old male who was referred by Leah Ford for [facility] services for diabetes management. He notes that for the past 3 months, his phone was not functioning well and this is why he was unable to complete his appointments.    Diabetes: Phani takes liraglutide, metformin, and insulin. His current dose of insulin glargine is 20 units BID.  He notes that he ran out of metformin for two days and he doesn't feel as well when he doesn't take the medication.   Fasting BG: right around 130s per patient. Lowest reading in the morning is always above 100   Denies signs/symptoms of hypoglycemia.    Phani has not been walking as much as he used to. He has been watching the news more often and busy with that. He is worried that [place], [place] has been having political issues.    HTN: Taking lisinopril, metoprolol and amlodipine    Lipids: Taking atorvastatin    CV: He notes that his nitroglycerin will be  in November. The last time he took it was one month ago. Has been forgetting aspirin recently.       Urination difficulties: Phani wants to make an appointment to see a specialist to evaluate some pain he has with urination.  He feels this pain on and off for five years.  He will plan to bring this up with Leah when he sees her in a week.     Environmental factors that impact patient: cell phone not working, per above.      Patient reports taking medications as prescribed. He is using a pill box and this helps him to remember his medications. However, he does miss medications when he runs out of  "refills.    Patient Active Problem List   Diagnosis     Hypercholesteremia     CARDIOVASCULAR SCREENING; LDL GOAL LESS THAN 100     Hypertension goal BP (blood pressure) < 130/80     Type 2 diabetes, HbA1C goal < 8% (H)     Advanced directives, counseling/discussion     Impingement syndrome, shoulder     Pain in joint, shoulder region     Type 2 diabetes mellitus without complication (H)     Vitamin D deficiency     Intermittent diarrhea        OBJECTIVE:     SAURAV-7 SCORE 11/20/2018 6/11/2019   Total Score 6 5       PHQ-9 SCORE 11/20/2018 6/11/2019   PHQ-9 Total Score 4 8         VITALS:  BP Readings from Last 3 Encounters:   05/04/21 137/72   12/15/20 (!) 147/75   09/22/20 137/69           Weight:   Wt Readings from Last 1 Encounters:   05/04/21 76.7 kg (169 lb 1.6 oz)       Height:   Ht Readings from Last 1 Encounters:   05/04/21 1.717 m (5' 7.6\")       LABORATORY VALUES:   Last A1C:    Lab Results   Component Value Date    A1C 7.6 05/04/2021   .    Last Basic Metabolic Panel:  Lab Results   Component Value Date     05/04/2021      Lab Results   Component Value Date    POTASSIUM 4.6 05/04/2021     Lab Results   Component Value Date    CHLORIDE 109 05/04/2021     Lab Results   Component Value Date    LIU 9.8 05/04/2021     Lab Results   Component Value Date    CO2 21 05/04/2021     Lab Results   Component Value Date    BUN 23 05/04/2021     Lab Results   Component Value Date    CR 1.05 05/04/2021     Lab Results   Component Value Date     05/04/2021       Lipid Panel Labs  Lab Results   Component Value Date    CHOL 164 09/22/2020    CHOL 148 03/12/2019     Lab Results   Component Value Date    TRIG 207 09/22/2020    TRIG 102 03/12/2019     Lab Results   Component Value Date    HDL 37 09/22/2020    HDL 38 03/12/2019     Lab Results   Component Value Date    LDL 86 09/22/2020    LDL 90 03/12/2019       Hepatic Panel Labs  Lab Results   Component Value Date    AST 10 09/22/2020     Lab Results   Component " Value Date    ALT 19 09/22/2020         SOCIAL AND FAMILY HISTORY  Social History     Tobacco Use     Smoking status: Never Smoker     Smokeless tobacco: Former User     Types: Snuff   Substance Use Topics     Alcohol use: No    .  Most Recent Immunizations   Administered Date(s) Administered     COVID-19,PF,Pfizer 05/25/2021     DTAP (<7y) 05/13/2003     DTaP, Unspecified 12/23/2011     FLU 6-35 months 12/09/2004     Flu, Unspecified 12/09/2004     Influenza (IIV3) PF 12/23/2011     Influenza Vaccine IM > 6 months Valent IIV4 (Alfuria,Fluzone) 11/01/2019     Influenza Vaccine Im 4yrs+ 4 Valent CCIIV4 09/01/2021     MMR 05/13/2003     Pneumococcal 23 valent 12/23/2011     TDAP Vaccine (Adacel) 12/23/2011     Td (Adult), Adsorbed 05/13/2003     Tdap (Adult) Unspecified Formulation 12/23/2011       CURRENT MEDICATIONS:   Current Outpatient Medications   Medication Sig Dispense Refill     amLODIPine (NORVASC) 10 MG tablet Take 1 tablet (10 mg) by mouth daily 90 tablet 1     aspirin (ASA) 81 MG chewable tablet Take 1 tablet (81 mg) by mouth daily 100 tablet 2     atorvastatin (LIPITOR) 40 MG tablet Take 1 tablet (40 mg) by mouth daily 90 tablet 3     blood glucose (NO BRAND SPECIFIED) test strip Test twice daily. 2 Box 4     blood glucose (NO BRAND SPECIFIED) test strip Use to test blood sugar 2 times daily or as directed. 100 each 1     blood glucose monitoring (ACCU-CHEK COMPACT CARE KIT) meter device kit Use to test blood sugars 1-2 times daily or as directed. 1 kit 0     blood glucose monitoring (ACCU-CHEK MULTICLIX) lancets Use to test blood sugar 1-2 times daily or as directed. 102 each 10     Blood Pressure Monitoring (PREMIUM AUTOMATIC BP MONITOR) CHIARA 1 Device daily (Patient not taking: Reported on 5/4/2021) 1 each 0     famotidine (PEPCID) 20 MG tablet Take 1 tablet (20 mg) by mouth 2 times daily 60 tablet 11     gabapentin (NEURONTIN) 100 MG capsule Take 1 capsule (100 mg) by mouth 3 times daily 90 capsule 0      glucose (BD GLUCOSE) 5 g chewable tablet Take 2 tablets (10 g) by mouth as needed (prn blood sugar < 70) 15 tablet 0     insulin glargine (LANTUS PEN) 100 UNIT/ML pen Inject 19 Units Subcutaneous 2 times daily 15 mL 3     insulin pen needle (B-D U/F) 31G X 8 MM miscellaneous USE TO INJECT INSULIN TWICE DAILY AND VICTOZA ONCE DAILY 100 each 9     liraglutide (VICTOZA PEN) 18 MG/3ML solution Inject 1.2 mg Subcutaneous daily 6 mL 3     lisinopril (ZESTRIL) 40 MG tablet Take 1 tablet (40 mg) by mouth daily 90 tablet 1     metFORMIN (GLUCOPHAGE) 500 MG tablet Take 2 tablets (1,000 mg) by mouth 2 times daily (with meals) 120 tablet 2     metoprolol tartrate (LOPRESSOR) 100 MG tablet Take 1 tablet (100 mg) by mouth 2 times daily 60 tablet 5     nitroGLYcerin (NITROSTAT) 0.4 MG sublingual tablet PLEASE SEE ATTACHED FOR DETAILED DIRECTIONS 25 tablet 0     Vitamin D3 (CHOLECALCIFEROL) 25 mcg (1000 units) tablet Take 1 tablet (25 mcg) by mouth daily 100 tablet 3       ALLERGIES:     Allergies   Allergen Reactions     Latex Rash     Latex           ASSESSMENT:    Medication adherence: Not at goal. Patient out of several medications.  Medication therapy problem: Adherence    DM: Slightly above goal based on SMBG. Discussed how it might be beneficial emotionally and physically for Phani to walk more often. He is due for A1c and can get that at his clinic visit in one week.  It is probably best to wait for this result before adjusting medications. Addition of an SGLT2 like empagliflozin could be considered if Phani is not at goal based on his A1c.  Medication therapy problem: Needs additional monitoring (A1c for efficacy)    HTN: Recent BP have been below 140/90, but a lower goal may be beneficial for Phani.  He will have next in clinic BP taken in one week.  If he is not at goal, addition of an SGLT2 (per above) might also help slightly with BP.    Cholesterol: Currently taking high intensity statin. Last panel was about 1 year  ago. Could benefit from another cholesterol panel at visit in one week.    Urination difficulties: These have been ongoing and should be brought up at PCP visit.    All medications were reviewed and found to be indicated, effective, safe and convenient unless drug therapy problem identified as described above.    PLAN:   - Refilled many of Phani's medications since he is out of a couple of them or will be out soon  - Encouraged Phani to bring up urination issues with Leah during his upcoming visit  - Will follow up with Phani based on results of BP and A1c.    Options for treatment and/or follow-up care were reviewed with the patient. Phani Villaloboskadi Phani was engaged and actively involved in the decision making process. He/She verbalized understanding of the options discussed and was satisfied with the final plan.    Patient was provided with written instructions/medication list via AVS.       Medical conditions reviewed: 4    Medications reviewed: 15    MTP identified: 2    Time spent: 30 minutes    Level of service: 3

## 2021-10-05 NOTE — TELEPHONE ENCOUNTER
Patient missed appointment, called him to assure he was OK as unusual for him to miss appointment. He thought it was tomorrow so we have rescheduled to next week.  He indicates he is doing fine. Leah SILVA CNP

## 2021-10-12 NOTE — NURSING NOTE
"ROOM:2    Preferred Name: Phani     61 year old  Chief Complaint   Patient presents with     Diabetes       Blood pressure 136/78, pulse 82, temperature 98.7  F (37.1  C), temperature source Oral, height 1.73 m (5' 8.1\"), weight 79 kg (174 lb 1.6 oz), SpO2 97 %. Body mass index is 26.39 kg/m .      Patient Active Problem List   Diagnosis     Hypercholesteremia     CARDIOVASCULAR SCREENING; LDL GOAL LESS THAN 100     Hypertension goal BP (blood pressure) < 130/80     Type 2 diabetes, HbA1C goal < 8% (H)     Advanced directives, counseling/discussion     Impingement syndrome, shoulder     Pain in joint, shoulder region     Type 2 diabetes mellitus without complication (H)     Vitamin D deficiency     Intermittent diarrhea       Wt Readings from Last 2 Encounters:   10/12/21 79 kg (174 lb 1.6 oz)   05/04/21 76.7 kg (169 lb 1.6 oz)     BP Readings from Last 3 Encounters:   10/12/21 136/78   05/04/21 137/72   12/15/20 (!) 147/75       Allergies   Allergen Reactions     Latex Rash     Latex        Current Outpatient Medications   Medication     ACCU-CHEK GUIDE test strip     amLODIPine (NORVASC) 10 MG tablet     aspirin (ASA) 81 MG chewable tablet     atorvastatin (LIPITOR) 40 MG tablet     blood glucose (NO BRAND SPECIFIED) test strip     blood glucose monitoring (ACCU-CHEK COMPACT CARE KIT) meter device kit     blood glucose monitoring (ACCU-CHEK MULTICLIX) lancets     Blood Pressure Monitoring (PREMIUM AUTOMATIC BP MONITOR) CHIARA     famotidine (PEPCID) 20 MG tablet     gabapentin (NEURONTIN) 100 MG capsule     glucose (BD GLUCOSE) 5 g chewable tablet     insulin pen needle (B-D U/F) 31G X 8 MM miscellaneous     LANTUS SOLOSTAR 100 UNIT/ML soln     liraglutide (VICTOZA PEN) 18 MG/3ML solution     lisinopril (ZESTRIL) 40 MG tablet     metFORMIN (GLUCOPHAGE) 500 MG tablet     metoprolol tartrate (LOPRESSOR) 100 MG tablet     nitroGLYcerin (NITROSTAT) 0.4 MG sublingual tablet     Vitamin D3 (CHOLECALCIFEROL) 25 mcg (1000 " units) tablet     No current facility-administered medications for this visit.       Social History     Tobacco Use     Smoking status: Never Smoker     Smokeless tobacco: Former User     Types: Snuff   Substance Use Topics     Alcohol use: No     Drug use: No       Honoring Choices - Health Care Directive Guide offered to patient at time of visit.    Health Maintenance Due   Topic Date Due     HIV SCREENING  Never done     HEPATITIS C SCREENING  Never done     ZOSTER IMMUNIZATION (1 of 2) Never done     PREVENTIVE CARE VISIT  03/12/2014     EYE EXAM  06/20/2015     ADVANCE CARE PLANNING  03/12/2018     COLORECTAL CANCER SCREENING  01/10/2021     LIPID  09/22/2021     DIABETIC FOOT EXAM  09/22/2021       Immunization History   Administered Date(s) Administered     COVID-19,PF,Pfizer 05/04/2021, 05/25/2021     DTAP (<7y) 04/02/2003, 05/13/2003     DTaP, Unspecified 12/23/2011     FLU 6-35 months 12/09/2004     Flu, Unspecified 12/09/2004     Influenza (IIV3) PF 12/23/2011     Influenza Vaccine IM > 6 months Valent IIV4 (Alfuria,Fluzone) 12/04/2018, 11/01/2019, 08/13/2020, 08/30/2021     Influenza Vaccine Im 4yrs+ 4 Valent CCIIV4 09/01/2021     MMR 04/02/2003, 05/13/2003     Pneumococcal 23 valent 12/23/2011     TDAP Vaccine (Adacel) 12/23/2011     Td (Adult), Adsorbed 04/02/2003, 05/13/2003     Tdap (Adult) Unspecified Formulation 12/23/2011       No results found for: PAP      Recent Labs   Lab Test 05/04/21  1200 05/04/21  1123 12/15/20  1415 09/22/20  1400 09/22/20  1320 05/07/20  1428 06/11/19  1325 06/11/19  1300 03/12/19  1207 03/12/19  1157 11/20/18  1455 11/20/18  1426 06/06/14  0945 06/06/14  0945   A1C 7.6*  --  7.4* 9.1*  --    < >   < >  --    < >  --    < >  --   --  8.8*   LDL  --   --   --   --  86  --   --   --   --  90  --   --   --  174*   HDL  --   --   --   --  37*  --   --   --   --  38*  --   --   --  31*   TRIG  --   --   --   --  207*  --   --   --   --  102  --   --   --  247*   ALT  --   --    --   --  19  --   --   --   --   --   --  24  --  28   CR  --  1.05  --   --  0.99  --    < > 0.99  --   --    < > 0.88   < > 0.83   GFRESTIMATED  --  75  --   --  81  --    < > 81  --   --    < > 88   < > >90   GFRESTBLACK  --  87  --   --  >90  --    < > >90  --   --    < > >90   < > >90   ALBUMIN  --   --   --   --  4.0  --   --   --   --   --   --  4.3   < > 4.1   POTASSIUM  --  4.6  --   --  4.3  --    < > 4.3  --   --    < > 4.5   < > 4.4   TSH  --   --   --   --   --   --   --  1.35  --   --   --  1.83  --   --     < > = values in this interval not displayed.       PHQ-2 ( 1999 Pfizer) 10/12/2021 6/11/2019   Q1: Little interest or pleasure in doing things 0 2   Q2: Feeling down, depressed or hopeless 1 1   PHQ-2 Score 1 3       PHQ-9 SCORE 11/20/2018 6/11/2019   PHQ-9 Total Score 4 8       SAURAV-7 SCORE 11/20/2018 6/11/2019   Total Score 6 5       No flowsheet data found.      Jolene Savage, [facility]  October 12, 2021 12:42 PM

## 2021-10-12 NOTE — PROGRESS NOTES
SUBJECTIVE: Phani is a 61 year old male who was referred by Leah Ford for [facility] services for diabetes management.    DM: Phani is taking metformin, liraglutide, and glargine insulin. Phani has not biked or been as active recently as he has been in the past. Additionally, he notes that he has been eating dates frequently (5 at a time) and also pancakes with jam on them.     Did not bring glucometer.  Per memory  Fastins  2 hours after a meal: 150 recently  Denies readings below 70 or signs/symptoms of hypoglycemia.*  * One exception is that he did discuss a situation where he felt cold and shaky. However, this happened after dinner (a dinner which involved carbohydrates).     Insomnia: Phani reported to PCP that he is having difficulty sleeping.  See Leah Ford's note today for more information    Environmental factors that impact patient: He has been more stressed recently with concerns about what is happening in his home country of [place].    Patient reports taking his medications as prescribed most of the time.  He forgets his nightly doses 1-2 times per week.         Patient Active Problem List   Diagnosis     Hypercholesteremia     CARDIOVASCULAR SCREENING; LDL GOAL LESS THAN 100     Hypertension goal BP (blood pressure) < 130/80     Type 2 diabetes, HbA1C goal < 8% (H)     Advanced directives, counseling/discussion     Impingement syndrome, shoulder     Pain in joint, shoulder region     Type 2 diabetes mellitus without complication (H)     Vitamin D deficiency     Intermittent diarrhea        OBJECTIVE:     SAURAV-7 SCORE 2018 2019 10/12/2021   Total Score 6 5 6       PHQ-9 SCORE 2018 2019 10/12/2021   PHQ-9 Total Score 4 8 6         VITALS:  BP Readings from Last 3 Encounters:   10/12/21 136/78   21 137/72   12/15/20 (!) 147/75           Weight:   Wt Readings from Last 1 Encounters:   10/12/21 79 kg (174 lb 1.6 oz)       Height:   Ht Readings from Last 1 Encounters:   10/12/21  "1.73 m (5' 8.1\")       LABORATORY VALUES:   Last A1C:    Lab Results   Component Value Date    A1C 8.8 10/12/2021    A1C 7.6 05/04/2021   .    Last Basic Metabolic Panel:  Lab Results   Component Value Date     05/04/2021      Lab Results   Component Value Date    POTASSIUM 4.6 05/04/2021     Lab Results   Component Value Date    CHLORIDE 109 05/04/2021     Lab Results   Component Value Date    LIU 9.8 05/04/2021     Lab Results   Component Value Date    CO2 21 05/04/2021     Lab Results   Component Value Date    BUN 23 05/04/2021     Lab Results   Component Value Date    CR 1.05 05/04/2021     Lab Results   Component Value Date     05/04/2021       Lipid Panel Labs  Lab Results   Component Value Date    CHOL 164 09/22/2020    CHOL 148 03/12/2019     Lab Results   Component Value Date    TRIG 207 09/22/2020    TRIG 102 03/12/2019     Lab Results   Component Value Date    HDL 37 09/22/2020    HDL 38 03/12/2019     Lab Results   Component Value Date    LDL 86 09/22/2020    LDL 90 03/12/2019       Hepatic Panel Labs  Lab Results   Component Value Date    AST 10 09/22/2020     Lab Results   Component Value Date    ALT 19 09/22/2020         SOCIAL AND FAMILY HISTORY  Social History     Tobacco Use     Smoking status: Never Smoker     Smokeless tobacco: Former User     Types: Snuff   Substance Use Topics     Alcohol use: No    .  Most Recent Immunizations   Administered Date(s) Administered     COVID-19,PF,Pfizer 05/25/2021     DTAP (<7y) 05/13/2003     DTaP, Unspecified 12/23/2011     FLU 6-35 months 12/09/2004     Flu, Unspecified 12/09/2004     Influenza (IIV3) PF 12/23/2011     Influenza Vaccine IM > 6 months Valent IIV4 (Alfuria,Fluzone) 08/30/2021     Influenza Vaccine Im 4yrs+ 4 Valent CCIIV4 09/01/2021     MMR 05/13/2003     Pneumococcal 23 valent 12/23/2011     TDAP Vaccine (Adacel) 12/23/2011     Td (Adult), Adsorbed 05/13/2003     Tdap (Adult) Unspecified Formulation 12/23/2011       CURRENT " MEDICATIONS:   Current Outpatient Medications   Medication Sig Dispense Refill     ACCU-CHEK GUIDE test strip TEST TWICE A DAY 50 strip 9     amLODIPine (NORVASC) 10 MG tablet Take 1 tablet (10 mg) by mouth daily 90 tablet 1     aspirin (ASA) 81 MG chewable tablet Take 1 tablet (81 mg) by mouth daily 100 tablet 2     atorvastatin (LIPITOR) 40 MG tablet Take 1 tablet (40 mg) by mouth daily 90 tablet 3     blood glucose (NO BRAND SPECIFIED) test strip Use to test blood sugar 2 times daily or as directed. 100 each 1     blood glucose monitoring (ACCU-CHEK COMPACT CARE KIT) meter device kit Use to test blood sugars 1-2 times daily or as directed. 1 kit 0     blood glucose monitoring (ACCU-CHEK MULTICLIX) lancets Use to test blood sugar 1-2 times daily or as directed. 102 each 10     Blood Pressure Monitoring (PREMIUM AUTOMATIC BP MONITOR) CHIARA 1 Device daily 1 each 0     empagliflozin (JARDIANCE) 10 MG TABS tablet Take 1 tablet (10 mg) by mouth daily 30 tablet 1     famotidine (PEPCID) 20 MG tablet TAKE 1 TABLET BY MOUTH TWICE A DAY 60 tablet 11     gabapentin (NEURONTIN) 100 MG capsule TAKE 1 CAPSULE BY MOUTH THREE TIMES A DAY 90 capsule 0     glucose (BD GLUCOSE) 5 g chewable tablet Take 2 tablets (10 g) by mouth as needed (prn blood sugar < 70) 15 tablet 0     insulin pen needle (B-D U/F) 31G X 8 MM miscellaneous USE TO INJECT INSULIN TWICE DAILY AND VICTOZA ONCE DAILY 100 each 9     LANTUS SOLOSTAR 100 UNIT/ML soln INJECT 20 UNITS SUBCUTANEOUS 2 TIMES DAILY 15 mL 3     liraglutide (VICTOZA PEN) 18 MG/3ML solution Inject 1.2 mg Subcutaneous daily 6 mL 3     lisinopril (ZESTRIL) 40 MG tablet Take 1 tablet (40 mg) by mouth daily 90 tablet 1     metFORMIN (GLUCOPHAGE) 500 MG tablet TAKE 2 TABLETS BY MOUTH 2 TIMES DAILY WITH MEALS 120 tablet 2     metoprolol tartrate (LOPRESSOR) 100 MG tablet Take 1 tablet (100 mg) by mouth 2 times daily 60 tablet 5     nitroGLYcerin (NITROSTAT) 0.4 MG sublingual tablet Place 1 tablet  (0.4 mg) under the tongue every 5 minutes as needed for chest pain for 3 doses. If symptoms persist 5 minutes after 1st dose call 911. 25 tablet 0     Vitamin D3 (CHOLECALCIFEROL) 25 mcg (1000 units) tablet Take 1 tablet (25 mcg) by mouth daily 100 tablet 3       ALLERGIES:     Allergies   Allergen Reactions     Latex Rash     Latex           ASSESSMENT:    Diabetes: Not at goal based on reported BG or A1c taken in clinic today. Goal A1c is less than 7. Given that A1c is 8.8, Phani will likely benefit from treatment with both antiglycemic medication as well as lifestyle changes. Empagliflozin is a good option for Phani to both help lower BG and also potentially reduce weight.  Medication therapy problem: Needs additional medication    Insomnia: Not at goal. This is potentially occurring for mutliple reasons, but given that Phani is waking up in the middle of the night to urinate, his blood sugar level is likely impacting his sleep.     All medications were reviewed and found to be indicated, effective, safe and convenient unless drug therapy problem identified as described above.    PLAN:     - Encouraged Phani's plan around making lifestyle changes  - Will start empagliflozin 10 mg daily. Discussed how to take and what to expect.   - Follow up in 2 weeks with this writer and PCP, Leah Ford.    Options for treatment and/or follow-up care were reviewed with the patient. Phani Jeronimo was engaged and actively involved in the decision making process. He/She verbalized understanding of the options discussed and was satisfied with the final plan.    Patient was provided with written instructions/medication list via AVS.       Medical conditions reviewed: 2    Medications reviewed: 13    MTP identified: 1    Time spent: 20 minutes    Level of service: 2

## 2021-10-12 NOTE — PATIENT INSTRUCTIONS
Diabetes  A1c is 8.8%. This is your 3 month blood sugar average. We wouldlike to see it closer to 7. This is up from previous.  Continue to work on diet (low fat, low carbohydrate (breads, cereals, sweetened beverages) and increase vegeteables. Try to exercise 20-30 minutes 5 times per week at least.    Lenora Mo Pharm D recommends Jardiance so will start that at 10mg per day.   Follow up in 2 weeks.   You have also gained 5 pounds since the last visit, so working on diet and exercise may help that.     Urinary hesitancy and urgency: probably due to higher blood sugar. No evidence for urinary tract infection. Will check your prostate level also as this can lead to urinary hesitancy in your age group.     Insomnia  Will address your higher blood sugars first to see if this helps sleep and nighttime waking to urinate.  If not helping, can consider some medication to help with sleep.    Mood  Your screening tests show you have mild depression and anxiety. Controlling your blood sugars might help, but encourage you to engage more with your community if possible.  Will follow up at next visit.

## 2021-10-12 NOTE — PROGRESS NOTES
HPI       Phani Jeronimo is a 61 year old  who presents for   Chief Complaint   Patient presents with     Diabetes     61 year-old male presents to clinic for follow up of Diabetes, but has several other concerns also.   DM:.  BS running 120-140. 150s in evening.   Denies episodes of low blood sugar, but has a couple of episodes where felt dizzy while lying down during the day. Has not fainted. Has not noted it at night. Diet is OK, but appetite is less.  He is walking most days.     Other concerns:    1. Insomnia:  Is not sleeping well. Often goes to bed early and wakes up at 3am. Denies snoring, Does not feel rested and often tired during the day. Tries to nap, but cannot. This is when he feels dizzy. Thinks he is only getting 3-4 hours of sleep at night.  Also awakens to urinate at night    2. Urgency, burning and hesitancy of urination: gets this off and on.  Denies fever, chills, back pain.  Has not had prostate checked in a few years.  Denies blood in urine.     3. Worried about his home country of [place]. States a lot happening there that he worries about. Indicates he does not have family there any more.     4. Chest pain: 1 episode, took NTG x3. Occurred when out of metoprolol. States pharmacy says he cannot ask for refill until he is out of medication.       Problem, Medication and Allergy Lists were reviewed and updated if needed..    Patient is an established patient of this clinic..         Review of Systems:   Review of Systems  GEN: denies weight changes, reports poor appetite, less walking recently  EYES: last eye exam last December he thinks. Denies vision changes  CV: chest pain as above. Denies irregular heart beats or peripheral edema  RESP: negative for cough, SOB  ABD: regular stool, denies abdominal pain or heartburn. Uses famotidine. Has returned H pylori test  : urination as per HPI  NEURO: denies headache, peripheral neuropathy in feet and legs much improved since on  "Gabapenting.   SKIN: no sores.  FEET: no concerns. Has cracked right great toenail, but this is not new.   ENDO: as per HPI. Episodes where develops shakes and feels cold. Has not check BS.   MOOD: some worry about home country, lack of friends here.        Physical Exam:     Vitals:    10/12/21 1241   BP: 136/78   Pulse: 82   Temp: 98.7  F (37.1  C)   TempSrc: Oral   SpO2: 97%   Weight: 79 kg (174 lb 1.6 oz)   Height: 1.73 m (5' 8.1\")     Body mass index is 26.39 kg/m .  Vital signs normal except BP slightly elevated. Orthostatic BPs were checked: These are : lying 126/72, Standing 1 151/76 and Standing 2 146/75     Physical Exam  GEN: alert talkative male in NAD  EYEs clear, PER, EOM intact.    CV: HRRR, S1, S2, no MRG. Pedal pulses +2 bilaterally, no peripheral edema.   RESP: lungs CTA  ABD: soft with BSx4, nontender, no masses or HSM. No SP tenderness. NO CVAT.  FOOT EXAM: sensation intact to monofilament plantar and dorsal surfaces. No lesions, callouses. Pink and warm. Cap refill < 2 seconds. Midline vertical ridging right great toenail.   MOOD  PHQ 11/20/2018 6/11/2019 10/12/2021   PHQ-9 Total Score 4 8 6   Q9: Thoughts of better off dead/self-harm past 2 weeks Not at all Not at all Not at all     SAURAV-7 SCORE 11/20/2018 6/11/2019 10/12/2021   Total Score 6 5 6           Results:      Results from this visit  Results for orders placed or performed in visit on 10/12/21   Hemoglobin A1c     Status: None   Result Value Ref Range    Hemoglobin A1C 8.8 %   UA without Microscopic     Status: Abnormal   Result Value Ref Range    Color Urine Dark Yellow (A) Colorless, Straw, Light Yellow, Yellow    Appearance Urine Clear Clear    Glucose Urine 250  (A) Negative mg/dL    Bilirubin Urine Moderate (A) Negative    Ketones Urine 15  (A) Negative mg/dL    Specific Gravity Urine 1.025 1.003 - 1.035    Blood Urine Negative Negative    pH Urine 5.0 5.0 - 7.0    Protein Albumin Urine 30  (A) Negative mg/dL    Urobilinogen Urine " 1.0 0.2, 1.0 E.U./dL    Nitrite Urine Negative Negative    Leukocyte Esterase Urine Negative Negative       Assessment and Plan        1. Type 2 diabetes mellitus with diabetic polyneuropathy, with long-term current use of insulin (H)  A1c is 8.8% which is substantial increase from previous. Weight also up 5 pounds. Discussed diet (low fat, low carbohydrate, increased vegetables), exercise. Drinking sugar free juice a lot during the day. Suggested he water that down to add more water to day.  Obtain BS log next 2 days. Consulted with pharmacy.  Added Jardiance 10mg along with diet, exercise and weight control.  Pharm D discussed with patient.  Goal A1c closer to 7. Reviewed signs and symptoms of low blood sugar and requested he check BS when he feels cold.   - Hemoglobin A1c; Future  - Basic metabolic panel; Future  - Albumin Random Urine Quantitative with Creat Ratio; Future  - Hemoglobin A1c  - Basic metabolic panel  - Albumin Random Urine Quantitative with Creat Ratio    2. Urinary hesitancy  Must consider BPH given age.   - UA without Microscopic; Future  - Prostate spec antigen screen; Future  - Prostate spec antigen screen  - UA without Microscopic    3. Urinary urgency  Urgency and burning occasionally on urination. Consider Hyperglycemia, UTI. Given increase in A1c, could be related to hyperglycemia,  No suggestion of UTI on UA but there is protein, glucose, ketones and concentrated  - UA without Microscopic; Future  - Prostate spec antigen screen; Future  - Prostate spec antigen screen  - UA without Microscopic    4. Hyperlipidemia LDL goal <100  Continues to take atorvastatin daily. Diet as above: Reinforced low fat foods, low carbohydrate foods.   - Lipid panel reflex to direct LDL Fasting; Future  - Lipid panel reflex to direct LDL Fasting    5. Temperature intolerance  Check TSH today but request patient monitor BS during these episodes.   - TSH with free T4 reflex; Future  - TSH with free T4  reflex    Follow up in 3 weeks.   Also recommend he contact us re: metoprolol when only 7-8 pills left so can refill and won't run out. Discussed risks of stopping BB abruptly.      There are no discontinued medications.    Options for treatment and follow-up care were reviewed with the patient. Phani Jeronimo  engaged in the decision making process and verbalized understanding of the options discussed and agreed with the final plan.    RICARDO De Luna CNP

## 2021-10-21 NOTE — TELEPHONE ENCOUNTER
Called patient to request that he brings in all medication bottles to his upcoming appointment on Tuesday.    While on the phone, Phani notes that since empagliflozin was started, he has noted his blood sugars decrease. In general, he is seeing 100-120 in the morning.  However, he has had two recent instances of seeing 68.  On one of those instances, he felt slightly shaky and ate dinner and then felt better.  Advised Phani to reduce his insulin dose for now from 20 units BID to 19 units BID.  Reviewed treatment of hypoglycemia. Also, requested that Phani let me know if he continues to see any readings below 70.    Additionally, Phani noted that he listened to Leah's recent voicemail regarding his increased potassium.  He shared that he has recenlty been eating several bananas per day, but will stop doing that at least until his upcoming visit.

## 2021-10-21 NOTE — TELEPHONE ENCOUNTER
TC to clinic re:lab results. Lipids OK,HDL low, advised to increase exercise. Renal function stable.  K+5.9. requested he return to recheck. Is on Lisinopril and no other med changes. If continued elevation, will need to treat to reduce. Leah SILVA CNP

## 2021-10-26 NOTE — PROGRESS NOTES
"       HPI       Phani Jeronimo is a 61 year old  who presents for   Chief Complaint   Patient presents with     Diabetes    61 year-old male presents for follow up of K+ 5.9. He also recently had lantus insulin reduced due to low BS in evening (60s-70s). Thinks K+ was due to eating more bananas recently.   Review of BS log shows BS 's mostly in am but still in 70s late afternoon.   Appetite is variable, eats 2 meals most days: mid morning and early evening, but evening meal varies widely. Sometimes eats quite late at 1am or 2am. Checks BS before am and pm meals. Has been exercising more in the afternoon and often only eats at 10am and 7pm. Early evening BS often lower and he is symptomatic with these. Lowest readings 65-70s.  Usually caries glucose tablets with him      Problem, Medication and Allergy Lists were reviewed and updated if needed.. Lenora Mo, Pharm D reviewed all meds which patient was asked to bring in with him    Patient is an established patient of this clinic..         Review of Systems:   Review of Systems  GEN: notes weight loss. Has reduced appetite so does not eat much. Has been exercising more.   ENDO: as per HPI  CV: negative for irregular heart beats  : negative for change in urination       Physical Exam:     Vitals:    10/26/21 1243   BP: 116/73   Pulse: 75   Temp: 97.3  F (36.3  C)   TempSrc: Oral   SpO2: 96%   Weight: 77 kg (169 lb 12.8 oz)   Height: 1.72 m (5' 7.7\")     Body mass index is 26.05 kg/m .  Vitals were reviewed and were normal     Physical Exam  GEN: talkative alert male in NAD.   VSS Rest of exam deferred as seen recently    Results:   Results are ordered and pending    Assessment and Plan        1. Hyperkalemia  Recheck today. Eat bananas and potatoes in moderation  - Potassium    2. Hypoglycemia  Regular meals: 2 meals roughly same time a day and 1 afternoon snack to offset low blood sugars  Carry glucose tabs with you in case you experience low blood " sugar  Decrease lantus to 18 units in am and continue 19 units  Goal BS   If BS drop below 70, let us know  Lenora (Pharm D) will follow up with you in 1 month  A1c and clinic f/u in 3 months         There are no discontinued medications.    Options for treatment and follow-up care were reviewed with the patient. Phani Jeronimo  engaged in the decision making process and verbalized understanding of the options discussed and agreed with the final plan.    RICARDO De Luna CNP

## 2021-10-26 NOTE — NURSING NOTE
"ROOM:1    Preferred Name: Phani     61 year old  Chief Complaint   Patient presents with     Diabetes       Blood pressure 116/73, pulse 75, temperature 97.3  F (36.3  C), temperature source Oral, height 1.72 m (5' 7.7\"), weight 77 kg (169 lb 12.8 oz), SpO2 96 %. Body mass index is 26.05 kg/m .      Patient Active Problem List   Diagnosis     Hypercholesteremia     CARDIOVASCULAR SCREENING; LDL GOAL LESS THAN 100     Hypertension goal BP (blood pressure) < 130/80     Type 2 diabetes, HbA1C goal < 8% (H)     Advanced directives, counseling/discussion     Impingement syndrome, shoulder     Pain in joint, shoulder region     Type 2 diabetes mellitus without complication (H)     Vitamin D deficiency     Intermittent diarrhea       Wt Readings from Last 2 Encounters:   10/26/21 77 kg (169 lb 12.8 oz)   10/12/21 79 kg (174 lb 1.6 oz)     BP Readings from Last 3 Encounters:   10/26/21 116/73   10/12/21 136/78   05/04/21 137/72       Allergies   Allergen Reactions     Latex Rash     Latex        Current Outpatient Medications   Medication     ACCU-CHEK GUIDE test strip     amLODIPine (NORVASC) 10 MG tablet     aspirin (ASA) 81 MG chewable tablet     atorvastatin (LIPITOR) 40 MG tablet     blood glucose (NO BRAND SPECIFIED) test strip     blood glucose monitoring (ACCU-CHEK COMPACT CARE KIT) meter device kit     blood glucose monitoring (ACCU-CHEK MULTICLIX) lancets     empagliflozin (JARDIANCE) 10 MG TABS tablet     famotidine (PEPCID) 20 MG tablet     gabapentin (NEURONTIN) 100 MG capsule     glucose (BD GLUCOSE) 5 g chewable tablet     insulin glargine (LANTUS SOLOSTAR) 100 UNIT/ML pen     insulin pen needle (B-D U/F) 31G X 8 MM miscellaneous     liraglutide (VICTOZA PEN) 18 MG/3ML solution     lisinopril (ZESTRIL) 40 MG tablet     metFORMIN (GLUCOPHAGE) 500 MG tablet     metoprolol tartrate (LOPRESSOR) 100 MG tablet     nitroGLYcerin (NITROSTAT) 0.4 MG sublingual tablet     Vitamin D3 (CHOLECALCIFEROL) 25 mcg (1000 units) " tablet     Blood Pressure Monitoring (PREMIUM AUTOMATIC BP MONITOR) CHIARA     No current facility-administered medications for this visit.       Social History     Tobacco Use     Smoking status: Never Smoker     Smokeless tobacco: Former User     Types: Snuff   Substance Use Topics     Alcohol use: No     Drug use: No       Honoring Choices - Health Care Directive Guide offered to patient at time of visit.    Health Maintenance Due   Topic Date Due     HIV SCREENING  Never done     HEPATITIS C SCREENING  Never done     ZOSTER IMMUNIZATION (1 of 2) Never done     PREVENTIVE CARE VISIT  03/12/2014     EYE EXAM  06/20/2015     ADVANCE CARE PLANNING  03/12/2018     COLORECTAL CANCER SCREENING  01/10/2021     DIABETIC FOOT EXAM  09/22/2021       Immunization History   Administered Date(s) Administered     COVID-19,PF,Pfizer 05/04/2021, 05/25/2021     DTAP (<7y) 04/02/2003, 05/13/2003     DTaP, Unspecified 12/23/2011     FLU 6-35 months 12/09/2004     Flu, Unspecified 12/09/2004     Influenza (IIV3) PF 12/23/2011     Influenza Vaccine IM > 6 months Valent IIV4 (Alfuria,Fluzone) 12/04/2018, 11/01/2019, 08/13/2020, 08/30/2021     Influenza Vaccine Im 4yrs+ 4 Valent CCIIV4 09/01/2021     MMR 04/02/2003, 05/13/2003     Pneumococcal 23 valent 12/23/2011     TDAP Vaccine (Adacel) 12/23/2011     Td (Adult), Adsorbed 04/02/2003, 05/13/2003     Tdap (Adult) Unspecified Formulation 12/23/2011       No results found for: PAP      Recent Labs   Lab Test 10/14/21  0939 10/12/21  1345 05/04/21  1200 05/04/21  1123 12/15/20  1415 09/22/20  1400 09/22/20  1320 09/22/20  1320 06/11/19  1325 06/11/19  1300 03/12/19  1207 03/12/19  1157 11/20/18  1455 11/20/18  1426 06/06/14  0945 06/06/14  0945   A1C  --  8.8 7.6*  --  7.4*   < >  --   --    < >  --    < >  --    < >  --   --  8.8*   LDL 90  --   --   --   --   --   --  86  --   --   --  90  --   --    < > 174*   HDL 34*  --   --   --   --   --   --  37*  --   --   --  38*  --   --    < >  31*   TRIG 88  --   --   --   --   --   --  207*  --   --   --  102  --   --    < > 247*   ALT  --   --   --   --   --   --   --  19  --   --   --   --   --  24  --  28   CR 1.02  --   --  1.05  --   --    < > 0.99   < > 0.99  --   --    < > 0.88   < > 0.83   GFRESTIMATED 78  --   --  75  --   --    < > 81   < > 81  --   --    < > 88   < > >90   GFRESTBLACK  --   --   --  87  --   --   --  >90   < > >90  --   --    < > >90   < > >90   ALBUMIN  --   --   --   --   --   --   --  4.0  --   --   --   --   --  4.3   < > 4.1   POTASSIUM 5.9*  --   --  4.6  --   --    < > 4.3   < > 4.3  --   --    < > 4.5   < > 4.4   TSH 1.06  --   --   --   --   --   --   --   --  1.35  --   --    < > 1.83  --   --     < > = values in this interval not displayed.       PHQ-2 ( 1999 Pfizer) 10/26/2021 10/12/2021   Q1: Little interest or pleasure in doing things 0 0   Q2: Feeling down, depressed or hopeless 0 1   PHQ-2 Score 0 1       PHQ-9 SCORE 11/20/2018 6/11/2019 10/12/2021   PHQ-9 Total Score 4 8 6       SAURAV-7 SCORE 11/20/2018 6/11/2019 10/12/2021   Total Score 6 5 6       No flowsheet data found.      Jolene Savage, [facility]  October 26, 2021 12:45 PM

## 2021-10-26 NOTE — PATIENT INSTRUCTIONS
High potassium  Will recheck today  Eat bananas and potatoes in moderation    Plan  Regular meals: 2 meals roughly same time a day and 1 afternoon snack to offset low blood sugars  Carry glucose tabs with you in case you experience low blood sugar  Decrease lantus to 18 units in am and continue 19 units  Goal BS   If BS drop below 70, let us know  Lenora (Pharm D) will follow up with you in 1 month

## 2021-10-26 NOTE — PROGRESS NOTES
Telephone/video visits are billed at different rates depending on your insurance coverage. During this emergency period, for some insurers they may be billed the same as an in-person visit.  Please reach out to your insurance provider with any questions.  If during the course of the call the physician/provider feels a telephone visit is not appropriate, you will not be charged for this service.    SUBJECTIVE: Phani is a 61 year old male who was referred by Leah Ford for MT services for medication management. During recent phone call, Phani noted blood glucose readings below 70.  At that time, his insulin was decreased to 19 units BID.  Two weeks ago, Phani was initiated on empagliflozin.    Diabetes: Taking liraglutide, metformin, empagliflozin, and insulin glargine 19 units BID. He has started to exercise more frequently since his last visit.  Additionally, he  He brings in BG readings.      SMBG since decreasing insulin dose:  Fastin, 97, 150, 132, 107, 138  Before dinner: 78, 192, 72, 79  Denies signs/symptoms of hypoglycemia, but did feel hungry with reading in the low 70s.    Insomnia: Phani notes this has improved.    Medication use: Phani brings all of his pill bottles and medications to clinic. When they are compared to our list, the only differences of note are that aspirin, nitroglycerin, and glucose tablets are not included in his pill bottles. He notes that he takes aspirin about 50% of days.  He requests a refill on glucose tablets.  He has nitroglycerin at home.      Environmental factors that impact patient: Unrest is occurring in Phani's home country of [place] which is stressful          Patient Active Problem List   Diagnosis     Hypercholesteremia     CARDIOVASCULAR SCREENING; LDL GOAL LESS THAN 100     Hypertension goal BP (blood pressure) < 130/80     Type 2 diabetes, HbA1C goal < 8% (H)     Advanced directives, counseling/discussion     Impingement syndrome, shoulder     Pain in joint,  "shoulder region     Type 2 diabetes mellitus without complication (H)     Vitamin D deficiency     Intermittent diarrhea        OBJECTIVE:     SAURAV-7 SCORE 11/20/2018 6/11/2019 10/12/2021   Total Score 6 5 6       PHQ-9 SCORE 11/20/2018 6/11/2019 10/12/2021   PHQ-9 Total Score 4 8 6         VITALS:  BP Readings from Last 3 Encounters:   10/26/21 116/73   10/12/21 136/78   05/04/21 137/72           Weight:   Wt Readings from Last 1 Encounters:   10/26/21 77 kg (169 lb 12.8 oz)       Height:   Ht Readings from Last 1 Encounters:   10/26/21 1.72 m (5' 7.7\")       LABORATORY VALUES:   Last A1C:    Lab Results   Component Value Date    A1C 8.8 10/12/2021    A1C 7.6 05/04/2021   .    Last Basic Metabolic Panel:  Lab Results   Component Value Date     10/14/2021     05/04/2021      Lab Results   Component Value Date    POTASSIUM 5.9 10/14/2021    POTASSIUM 4.6 05/04/2021     Lab Results   Component Value Date    CHLORIDE 113 10/14/2021    CHLORIDE 109 05/04/2021     Lab Results   Component Value Date    LIU 9.8 10/14/2021    LIU 9.8 05/04/2021     Lab Results   Component Value Date    CO2 27 10/14/2021    CO2 21 05/04/2021     Lab Results   Component Value Date    BUN 16 10/14/2021    BUN 23 05/04/2021     Lab Results   Component Value Date    CR 1.02 10/14/2021    CR 1.05 05/04/2021     Lab Results   Component Value Date     10/14/2021     05/04/2021       Lipid Panel Labs  Lab Results   Component Value Date    CHOL 142 10/14/2021    CHOL 164 09/22/2020    CHOL 148 03/12/2019     Lab Results   Component Value Date    TRIG 88 10/14/2021    TRIG 207 09/22/2020    TRIG 102 03/12/2019     Lab Results   Component Value Date    HDL 34 10/14/2021    HDL 37 09/22/2020    HDL 38 03/12/2019     Lab Results   Component Value Date    LDL 90 10/14/2021    LDL 86 09/22/2020    LDL 90 03/12/2019       Hepatic Panel Labs  Lab Results   Component Value Date    AST 10 09/22/2020     Lab Results   Component Value " Date    ALT 19 09/22/2020         SOCIAL AND FAMILY HISTORY  Social History     Tobacco Use     Smoking status: Never Smoker     Smokeless tobacco: Former User     Types: Snuff   Substance Use Topics     Alcohol use: No    .  Most Recent Immunizations   Administered Date(s) Administered     COVID-19,PF,Pfizer 05/25/2021     DTAP (<7y) 05/13/2003     DTaP, Unspecified 12/23/2011     FLU 6-35 months 12/09/2004     Flu, Unspecified 12/09/2004     Influenza (IIV3) PF 12/23/2011     Influenza Vaccine IM > 6 months Valent IIV4 (Alfuria,Fluzone) 08/30/2021     Influenza Vaccine Im 4yrs+ 4 Valent CCIIV4 09/01/2021     MMR 05/13/2003     Pneumococcal 23 valent 12/23/2011     TDAP Vaccine (Adacel) 12/23/2011     Td (Adult), Adsorbed 05/13/2003     Tdap (Adult) Unspecified Formulation 12/23/2011       CURRENT MEDICATIONS:   Current Outpatient Medications   Medication Sig Dispense Refill     amLODIPine (NORVASC) 10 MG tablet Take 1 tablet (10 mg) by mouth daily 90 tablet 1     aspirin (ASA) 81 MG chewable tablet Take 1 tablet (81 mg) by mouth daily 100 tablet 2     atorvastatin (LIPITOR) 40 MG tablet Take 1 tablet (40 mg) by mouth daily 90 tablet 3     empagliflozin (JARDIANCE) 10 MG TABS tablet Take 1 tablet (10 mg) by mouth daily 30 tablet 1     famotidine (PEPCID) 20 MG tablet TAKE 1 TABLET BY MOUTH TWICE A DAY 60 tablet 11     gabapentin (NEURONTIN) 100 MG capsule TAKE 1 CAPSULE BY MOUTH THREE TIMES A DAY 90 capsule 0     glucose (BD GLUCOSE) 5 g chewable tablet Take 2 tablets (10 g) by mouth daily as needed (prn blood sugar < 70) 15 tablet 4     insulin glargine (LANTUS SOLOSTAR) 100 UNIT/ML pen INJECT 19 UNITS SUBCUTANEOUS 2 TIMES DAILY 15 mL 3     liraglutide (VICTOZA PEN) 18 MG/3ML solution Inject 1.2 mg Subcutaneous daily 6 mL 5     lisinopril (ZESTRIL) 40 MG tablet Take 1 tablet (40 mg) by mouth daily 90 tablet 1     metFORMIN (GLUCOPHAGE) 500 MG tablet TAKE 2 TABLETS BY MOUTH 2 TIMES DAILY WITH MEALS 120 tablet 2      metoprolol tartrate (LOPRESSOR) 100 MG tablet Take 1 tablet (100 mg) by mouth 2 times daily 60 tablet 5     nitroGLYcerin (NITROSTAT) 0.4 MG sublingual tablet Place 1 tablet (0.4 mg) under the tongue every 5 minutes as needed for chest pain for 3 doses. If symptoms persist 5 minutes after 1st dose call 911. 25 tablet 0     Vitamin D3 (CHOLECALCIFEROL) 25 mcg (1000 units) tablet Take 1 tablet (25 mcg) by mouth daily 100 tablet 3     ACCU-CHEK GUIDE test strip TEST TWICE A DAY 50 strip 9     blood glucose (NO BRAND SPECIFIED) test strip Use to test blood sugar 2 times daily or as directed. 100 each 1     blood glucose monitoring (ACCU-CHEK COMPACT CARE KIT) meter device kit Use to test blood sugars 1-2 times daily or as directed. 1 kit 0     blood glucose monitoring (ACCU-CHEK MULTICLIX) lancets Use to test blood sugar 1-2 times daily or as directed. 102 each 10     Blood Pressure Monitoring (PREMIUM AUTOMATIC BP MONITOR) CHIARA 1 Device daily (Patient not taking: Reported on 10/26/2021) 1 each 0     insulin pen needle (B-D U/F) 31G X 8 MM miscellaneous USE TO INJECT INSULIN TWICE DAILY AND VICTOZA ONCE DAILY 100 each 9       ALLERGIES:     Allergies   Allergen Reactions     Latex Rash     Latex           ASSESSMENT:    Diabetes: Not at goal based on recent A1c, but at goal (slighlty below goal at times) based on SMBG. Since Phani is taking insulin and lives alone, it is best to be cautious about blood glucose readings that are on the lower side.  Phani agrees that a slight decrease in insulin might be best at this time.  MTP: dose too high     Insomnia: improved and at goal, per patient    Medication use: At goal per medication reconciliation.  Phani is not taking aspirin everyday but recent evidence about the benefits of aspirin is not so strong, especially for primary prevention in a 61 year old.     All medications were reviewed and found to be indicated, effective, safe and convenient unless drug therapy problem  identified as described above.    PLAN:     - Decrease insulin dose to 18 units in the morning and 19 units at night  - Emphasized eating 3 meals daily in an attempt to decrease risk for low blood sugars  -Call if blood sugar readings are below 70  - refilled glucose tablets, reviewed treatment of hypoglycemia  - Follow up over phone in one month    Options for treatment and/or follow-up care were reviewed with the patient. Phani Jeronimo was engaged and actively involved in the decision making process. He/She verbalized understanding of the options discussed and was satisfied with the final plan.    Patient was provided with written instructions/medication list via AVS.       Medical conditions reviewed: 3    Medications reviewed: 13    MTP identified: 1    Time spent: 30 minutes    Level of service: 2

## 2021-10-28 NOTE — TELEPHONE ENCOUNTER
TC to patient. Informed that K+ level is within normal again. He has made dietary changes. Leah SILVA CNP

## 2021-11-03 NOTE — TELEPHONE ENCOUNTER
GABAPENTIN 100 MG CAPSULE      Last Written Prescription Date:  9/28/21  Last Fill Quantity: 90,   # refills: 0  Last Office Visit : 10/26/21  Future Office visit:  None scheduled    Routing refill request to provider for review/approval because:  Drug not on primary care refill protocol

## 2021-11-23 NOTE — Clinical Note
Phani has been skipping his evening glargine and metformin if he belives his night reading is too low (when it is below 90).  He did experience one reading of 63.  I decreased his insulin and discussed taking the insulin everyday. Can have snack if readings are below 150 before bed.  I have a call with him in two weeks. - Lenora

## 2021-11-23 NOTE — PROGRESS NOTES
Telephone/video visits are billed at different rates depending on your insurance coverage. During this emergency period, for some insurers they may be billed the same as an in-person visit.  Please reach out to your insurance provider with any questions.  If during the course of the call the physician/provider feels a telephone visit is not appropriate, you will not be charged for this service.    SUBJECTIVE: Phani is a 61 year old male who was referred by Leah Ford for MTM services for medication management.      T2DM: Taking liraglutide, metformin, empagliflozin, and insulin glargin 18 units in the morning and 19 units in the evening.  He reports since starting empagliflozin, he has noticed a decrease in his blood sugars in the evening and due to this has been skipping his evening dose of insulin and metformin if his blood sugars are ~ 80-90 mg/dL. He states if his blood sugars are above 90 when he checks it in the evening, he will take his evening doses. He reports eating 2 to 3 meals per day and states that his evening meal is often small. He endorses an increase in urination and feels as though this has been ongoing along with difficulty urinating in the morning. He also reports some burning when urinated, but states that this has resolved.     Fastin, 90, 63  Evening (unclear which time): 85, 90, 150, 155  Reports feeling shaky with the blood sugar of 63 (corrected with quick acting sugar) and endorses feeling hungry when his blood sugar is ~ 80 mg/dL.    HTN: Taking amlodipine, lisinopril, and metoprolol. Reports that he takes his blood pressure medications in the morning with the metoprolol being in the evening as well. He states he occasionally forgets his evening dose of metoprolol.     ASCVD Prevention: Taking aspirin and atorvastatin. States he is taking aspirin daily. He does not put it in the pill box with his other medications but reports he is taking it once daily.      Patient Active Problem  "List   Diagnosis     Hypercholesteremia     CARDIOVASCULAR SCREENING; LDL GOAL LESS THAN 100     Hypertension goal BP (blood pressure) < 130/80     Type 2 diabetes, HbA1C goal < 8% (H)     Advanced directives, counseling/discussion     Impingement syndrome, shoulder     Pain in joint, shoulder region     Type 2 diabetes mellitus without complication (H)     Vitamin D deficiency     Intermittent diarrhea        OBJECTIVE:     SAURVA-7 SCORE 11/20/2018 6/11/2019 10/12/2021   Total Score 6 5 6     PHQ-9 SCORE 11/20/2018 6/11/2019 10/12/2021   PHQ-9 Total Score 4 8 6       VITALS:  BP Readings from Last 3 Encounters:   10/26/21 116/73   10/12/21 136/78   05/04/21 137/72         Weight:   Wt Readings from Last 1 Encounters:   10/26/21 77 kg (169 lb 12.8 oz)     Height:   Ht Readings from Last 1 Encounters:   10/26/21 1.72 m (5' 7.7\")       LABORATORY VALUES:   Last A1C:    Lab Results   Component Value Date    A1C 8.8 10/12/2021    A1C 7.6 05/04/2021   .  Last Basic Metabolic Panel:  Lab Results   Component Value Date     10/14/2021     05/04/2021      Lab Results   Component Value Date    POTASSIUM 5.0 10/26/2021    POTASSIUM 4.6 05/04/2021     Lab Results   Component Value Date    CHLORIDE 113 10/14/2021    CHLORIDE 109 05/04/2021     Lab Results   Component Value Date    LIU 9.8 10/14/2021    LIU 9.8 05/04/2021     Lab Results   Component Value Date    CO2 27 10/14/2021    CO2 21 05/04/2021     Lab Results   Component Value Date    BUN 16 10/14/2021    BUN 23 05/04/2021     Lab Results   Component Value Date    CR 1.02 10/14/2021    CR 1.05 05/04/2021     Lab Results   Component Value Date     10/14/2021     05/04/2021       Lipid Panel Labs  Lab Results   Component Value Date    CHOL 142 10/14/2021    CHOL 164 09/22/2020    CHOL 148 03/12/2019     Lab Results   Component Value Date    TRIG 88 10/14/2021    TRIG 207 09/22/2020    TRIG 102 03/12/2019     Lab Results   Component Value Date    HDL " 34 10/14/2021    HDL 37 09/22/2020    HDL 38 03/12/2019     Lab Results   Component Value Date    LDL 90 10/14/2021    LDL 86 09/22/2020    LDL 90 03/12/2019       Hepatic Panel Labs  Lab Results   Component Value Date    AST 10 09/22/2020     Lab Results   Component Value Date    ALT 19 09/22/2020       SOCIAL AND FAMILY HISTORY  Social History     Tobacco Use     Smoking status: Never Smoker     Smokeless tobacco: Former User     Types: Snuff   Substance Use Topics     Alcohol use: No    .  Most Recent Immunizations   Administered Date(s) Administered     COVID-19,PF,Pfizer (12+ Yrs) 05/25/2021     DTAP (<7y) 05/13/2003     DTaP, Unspecified 12/23/2011     FLU 6-35 months 12/09/2004     Flu, Unspecified 12/09/2004     Influenza (IIV3) PF 12/23/2011     Influenza Vaccine IM > 6 months Valent IIV4 (Alfuria,Fluzone) 08/30/2021     Influenza Vaccine Im 4yrs+ 4 Valent CCIIV4 09/01/2021     MMR 05/13/2003     Pneumococcal 23 valent 12/23/2011     TDAP Vaccine (Adacel) 12/23/2011     Td (Adult), Adsorbed 05/13/2003     Tdap (Adult) Unspecified Formulation 12/23/2011       CURRENT MEDICATIONS:   Current Outpatient Medications   Medication Sig Dispense Refill     ACCU-CHEK GUIDE test strip TEST TWICE A DAY 50 strip 9     amLODIPine (NORVASC) 10 MG tablet Take 1 tablet (10 mg) by mouth daily 90 tablet 3     aspirin (ASA) 81 MG chewable tablet Take 1 tablet (81 mg) by mouth daily 100 tablet 2     atorvastatin (LIPITOR) 40 MG tablet Take 1 tablet (40 mg) by mouth daily 90 tablet 3     blood glucose (NO BRAND SPECIFIED) test strip Use to test blood sugar 2 times daily or as directed. 100 each 1     blood glucose monitoring (ACCU-CHEK COMPACT CARE KIT) meter device kit Use to test blood sugars 1-2 times daily or as directed. 1 kit 0     blood glucose monitoring (ACCU-CHEK MULTICLIX) lancets Use to test blood sugar 1-2 times daily or as directed. 102 each 10     Blood Pressure Monitoring (PREMIUM AUTOMATIC BP MONITOR) CHIARA 1  Device daily (Patient not taking: Reported on 10/26/2021) 1 each 0     empagliflozin (JARDIANCE) 10 MG TABS tablet Take 1 tablet (10 mg) by mouth daily 30 tablet 1     famotidine (PEPCID) 20 MG tablet TAKE 1 TABLET BY MOUTH TWICE A DAY 60 tablet 11     gabapentin (NEURONTIN) 100 MG capsule Take 1 capsule (100 mg) by mouth 3 times daily 90 capsule 0     glucose (BD GLUCOSE) 5 g chewable tablet Take 2 tablets (10 g) by mouth daily as needed (prn blood sugar < 70) 15 tablet 4     insulin glargine (LANTUS SOLOSTAR) 100 UNIT/ML pen INJECT 18 UNITS SUBCUTANEOUS in the morning and 19 units at night 15 mL 3     insulin pen needle (B-D U/F) 31G X 8 MM miscellaneous USE TO INJECT INSULIN TWICE DAILY AND VICTOZA ONCE DAILY 100 each 9     liraglutide (VICTOZA PEN) 18 MG/3ML solution Inject 1.2 mg Subcutaneous daily 6 mL 5     lisinopril (ZESTRIL) 40 MG tablet Take 1 tablet (40 mg) by mouth daily 90 tablet 1     metFORMIN (GLUCOPHAGE) 500 MG tablet TAKE 2 TABLETS BY MOUTH 2 TIMES DAILY WITH MEALS 120 tablet 2     metoprolol tartrate (LOPRESSOR) 100 MG tablet Take 1 tablet (100 mg) by mouth 2 times daily 60 tablet 5     nitroGLYcerin (NITROSTAT) 0.4 MG sublingual tablet Place 1 tablet (0.4 mg) under the tongue every 5 minutes as needed for chest pain for 3 doses. If symptoms persist 5 minutes after 1st dose call 911. 25 tablet 0     Vitamin D3 (CHOLECALCIFEROL) 25 mcg (1000 units) tablet Take 1 tablet (25 mcg) by mouth daily 100 tablet 3       ALLERGIES:     Allergies   Allergen Reactions     Latex Rash     Latex         ASSESSMENT:    T2DM: Not at goal based on most recent A1c above goal of 7%. Based on reported SMBG, patient is at goal of fasted BG between  mg/dL and post-prandial readings < 180 mg/dL. As Phani is currently skipping evening doses of metformin and insulin due to lower readings in the evening (and even when skipping these, blood sugars remain at goal for several days, it would be appropriate to decrease the  overall dose of insulin because he may not need this high of a dose. Additionally, he is using to improve his comfort and adherence with his evening doses.   MTP: dose too high    HTN: At goal of < 130/80 mmHg based on last in clinic reading of 116/73 mmHg on 10/26/21. No changes in compliance noted. Continues to miss evening dose of metoprolol tartrate ~ 2 times per week, could be beneficial to revisit discussion of transitioning to metoprolol succinate so that he can take it once daily. No changes needed at this time.     ASCVD Prevention: At goal with current therapy of aspirin and statin. Compliance seems improved with aspirin reported as being taken daily. No changes needed at this time.     All medications were reviewed and found to be indicated, effective, safe and convenient unless drug therapy problem identified as described above.    PLAN:     1. Reduce insulin glargine dose to 15 units in the morning and 16 units at bedtime  2. Patient to call clinic if blood sugar readings are below 70  3. Follow-up over the phone in 2 weeks    Options for treatment and/or follow-up care were reviewed with the patient. Phani Jeronimo was engaged and actively involved in the decision making process. He/She verbalized understanding of the options discussed and was satisfied with the final plan.    Patient was provided with written instructions/medication list via AVS.       Medical conditions reviewed: 3    Medications reviewed: 14    MTP identified: 1    Time spent: 30 minutes    Level of service: 2

## 2021-12-07 NOTE — PROGRESS NOTES
Telephone/video visits are billed at different rates depending on your insurance coverage. During this emergency period, for some insurers they may be billed the same as an in-person visit.  Please reach out to your insurance provider with any questions.  If during the course of the call the physician/provider feels a telephone visit is not appropriate, you will not be charged for this service.    SUBJECTIVE: Phani is a 61 year old male who was referred by Leah Ford for MTM services for medication management.      T2DM: Taking liraglutide, metformin, empagliflozin, and insulin glargine 15 units in the morning and 16 units in the evening. Ran out of Victoza and didn't take it for 3 days, but picked up a refill today and will take today. At last visit, he was skipping evening metformin and insulin if he perceived his blood sugar to be too low and we discussed not skipping medications, but rather we decreased his overall insulin dose. Reports that he hasn't had to skip the evening dose of metformin and insulin anymore since reducing the dose of insulin.       Per memory:   Fastin mg/dL  Evenin, 132 mg/dL  Denies episodes of symptomatic hypoglycemia    Medication adherence/convenience: Discussed the option of transitioning to a pharmacy that could deliver his medications to he his house to help with adherence and he states he is very interested in this. He describes difficulties with his current pharmacy and states that they leave many messages for him and it is unclear what he is supposed to do. He states he is open to the options offered to him such as [facility] and [facility]. Discussed the option of blister packs and patient states that it would be okay.     Patient Active Problem List   Diagnosis     Hypercholesteremia     CARDIOVASCULAR SCREENING; LDL GOAL LESS THAN 100     Hypertension goal BP (blood pressure) < 130/80     Type 2 diabetes, HbA1C goal < 8% (H)     Advanced directives,  "counseling/discussion     Impingement syndrome, shoulder     Pain in joint, shoulder region     Type 2 diabetes mellitus without complication (H)     Vitamin D deficiency     Intermittent diarrhea      OBJECTIVE:     SAURAV-7 SCORE 11/20/2018 6/11/2019 10/12/2021   Total Score 6 5 6     PHQ-9 SCORE 11/20/2018 6/11/2019 10/12/2021   PHQ-9 Total Score 4 8 6     VITALS:   BP Readings from Last 3 Encounters:   10/26/21 116/73   10/12/21 136/78   05/04/21 137/72        Weight:   Wt Readings from Last 1 Encounters:   10/26/21 77 kg (169 lb 12.8 oz)     Height:   Ht Readings from Last 1 Encounters:   10/26/21 1.72 m (5' 7.7\")     LABORATORY VALUES:   Last A1C:    Lab Results   Component Value Date    A1C 8.8 10/12/2021    A1C 7.6 05/04/2021   .  Last Basic Metabolic Panel:  Lab Results   Component Value Date     10/14/2021     05/04/2021      Lab Results   Component Value Date    POTASSIUM 5.0 10/26/2021    POTASSIUM 4.6 05/04/2021     Lab Results   Component Value Date    CHLORIDE 113 10/14/2021    CHLORIDE 109 05/04/2021     Lab Results   Component Value Date    LIU 9.8 10/14/2021    LIU 9.8 05/04/2021     Lab Results   Component Value Date    CO2 27 10/14/2021    CO2 21 05/04/2021     Lab Results   Component Value Date    BUN 16 10/14/2021    BUN 23 05/04/2021     Lab Results   Component Value Date    CR 1.02 10/14/2021    CR 1.05 05/04/2021     Lab Results   Component Value Date     10/14/2021     05/04/2021     Lipid Panel Labs  Lab Results   Component Value Date    CHOL 142 10/14/2021    CHOL 164 09/22/2020    CHOL 148 03/12/2019     Lab Results   Component Value Date    TRIG 88 10/14/2021    TRIG 207 09/22/2020    TRIG 102 03/12/2019     Lab Results   Component Value Date    HDL 34 10/14/2021    HDL 37 09/22/2020    HDL 38 03/12/2019     Lab Results   Component Value Date    LDL 90 10/14/2021    LDL 86 09/22/2020    LDL 90 03/12/2019     Hepatic Panel Labs  Lab Results   Component Value Date "    AST 10 09/22/2020     Lab Results   Component Value Date    ALT 19 09/22/2020     SOCIAL AND FAMILY HISTORY  Social History     Tobacco Use     Smoking status: Never Smoker     Smokeless tobacco: Former User     Types: Snuff   Substance Use Topics     Alcohol use: No    .  Most Recent Immunizations   Administered Date(s) Administered     COVID-19,PF,Pfizer (12+ Yrs) 05/25/2021     DTAP (<7y) 05/13/2003     DTaP, Unspecified 12/23/2011     FLU 6-35 months 12/09/2004     Flu, Unspecified 12/09/2004     Influenza (IIV3) PF 12/23/2011     Influenza Vaccine IM > 6 months Valent IIV4 (Alfuria,Fluzone) 08/30/2021     Influenza Vaccine Im 4yrs+ 4 Valent CCIIV4 09/01/2021     MMR 05/13/2003     Pneumococcal 23 valent 12/23/2011     TDAP Vaccine (Adacel) 12/23/2011     Td (Adult), Adsorbed 05/13/2003     Tdap (Adult) Unspecified Formulation 12/23/2011     CURRENT MEDICATIONS:   Current Outpatient Medications   Medication Sig Dispense Refill     ACCU-CHEK GUIDE test strip TEST TWICE A DAY 50 strip 9     amLODIPine (NORVASC) 10 MG tablet Take 1 tablet (10 mg) by mouth daily 90 tablet 3     aspirin (ASPIRIN LOW DOSE) 81 MG chewable tablet Take 1 tablet (81 mg) by mouth daily 100 tablet 3     atorvastatin (LIPITOR) 40 MG tablet Take 1 tablet (40 mg) by mouth daily 90 tablet 3     blood glucose (NO BRAND SPECIFIED) test strip Use to test blood sugar 2 times daily or as directed. 100 each 1     blood glucose monitoring (ACCU-CHEK COMPACT CARE KIT) meter device kit Use to test blood sugars 1-2 times daily or as directed. 1 kit 0     blood glucose monitoring (ACCU-CHEK MULTICLIX) lancets Use to test blood sugar 1-2 times daily or as directed. 102 each 10     Blood Pressure Monitoring (PREMIUM AUTOMATIC BP MONITOR) CHIARA 1 Device daily (Patient not taking: Reported on 10/26/2021) 1 each 0     empagliflozin (JARDIANCE) 10 MG TABS tablet Take 1 tablet (10 mg) by mouth daily 30 tablet 3     famotidine (PEPCID) 20 MG tablet TAKE 1  TABLET BY MOUTH TWICE A DAY 60 tablet 11     gabapentin (NEURONTIN) 100 MG capsule Take 1 capsule (100 mg) by mouth 3 times daily 90 capsule 1     glucose (BD GLUCOSE) 5 g chewable tablet Take 2 tablets (10 g) by mouth daily as needed (prn blood sugar < 70) 15 tablet 4     insulin glargine (LANTUS SOLOSTAR) 100 UNIT/ML pen INJECT 15 UNITS SUBCUTANEOUS in the morning and 16 units at night 15 mL 3     insulin pen needle (B-D U/F) 31G X 8 MM miscellaneous USE TO INJECT INSULIN TWICE DAILY AND VICTOZA ONCE DAILY 100 each 9     liraglutide (VICTOZA PEN) 18 MG/3ML solution Inject 1.2 mg Subcutaneous daily 6 mL 5     lisinopril (ZESTRIL) 40 MG tablet Take 1 tablet (40 mg) by mouth daily 90 tablet 1     metFORMIN (GLUCOPHAGE) 500 MG tablet TAKE 2 TABLETS BY MOUTH 2 TIMES DAILY WITH MEALS 120 tablet 2     metoprolol tartrate (LOPRESSOR) 100 MG tablet Take 1 tablet (100 mg) by mouth 2 times daily 60 tablet 5     nitroGLYcerin (NITROSTAT) 0.4 MG sublingual tablet Place 1 tablet (0.4 mg) under the tongue every 5 minutes as needed for chest pain for 3 doses. If symptoms persist 5 minutes after 1st dose call 911. 25 tablet 0     Vitamin D3 (CHOLECALCIFEROL) 25 mcg (1000 units) tablet Take 1 tablet (25 mcg) by mouth daily 100 tablet 3     ALLERGIES:     Allergies   Allergen Reactions     Latex Rash     Latex       ASSESSMENT:    T2DM: Not at goal based on most recent A1c above goal of 7%. Based on reported SMBG, patient is at goal of fasted BG between  mg/dL and post-prandial readings < 180 mg/dL. No medication adjustments needed at this time. He is due for an A1c in January to further assess glycemic control.    Medication adherence/convenience: Sending future prescriptions to [facility] (or other mail order pharmacy) would be preferred in order to aid in patient adherence and improve communication when the patient is due for refills.   MTP: Adherence - forgets to take/refill    All medications were reviewed and found  to be indicated, effective, safe and convenient unless drug therapy problem identified as described above.    PLAN:     1. Continue current insulin glargine regimen of 15 units in the morning and 16 units at night.  2. Continue all other medications as prescribed.  3. PharmD to call [place] to assess prospect of filling prescriptions and having them mailed to his house  4. Write down your blood sugars for the next week and bring the log to your visit with Leah at your upcoming appointment on 12/7/21  5. [place] to fax paperwork to clinic, PharmD to fill out and patient to sign at visit with Leah in 1 week  6. Follow-up with PharmD on January 4th, 2022 over the phone      Options for treatment and/or follow-up care were reviewed with the patient. Phani Jeronimo was engaged and actively involved in the decision making process. He/She verbalized understanding of the options discussed and was satisfied with the final plan.    Patient was provided with written instructions/medication list via AVS.     Medical conditions reviewed: 3     Medications reviewed:  14     MTP identified: 1      Time spent:  45 minutes    Level of service: 2

## 2021-12-07 NOTE — Clinical Note
I talked with Phani today. He is no longer intentionally skipping his evening metformin and insulin.  Blood sugars look good right now.  However, he did recently go 3 days without victoza since he wasn't able to easily pick it up from pharmacy.  I'm going to get him set up with medication delivery through [facility] which he seems pleased about.  He'll sign the forms when he comes in for his visit with you next week. During that visit, please feel free to call me if I can hep in any way. - Lenora

## 2021-12-14 NOTE — NURSING NOTE
"ROOM:1    Preferred Name: Phani     61 year old  Chief Complaint   Patient presents with     Diabetes       Blood pressure 117/70, pulse 80, temperature 98.5  F (36.9  C), temperature source Oral, height 1.715 m (5' 7.5\"), weight 77.1 kg (170 lb), SpO2 93 %. Body mass index is 26.23 kg/m .      Patient Active Problem List   Diagnosis     Hypercholesteremia     CARDIOVASCULAR SCREENING; LDL GOAL LESS THAN 100     Hypertension goal BP (blood pressure) < 130/80     Type 2 diabetes, HbA1C goal < 8% (H)     Advanced directives, counseling/discussion     Impingement syndrome, shoulder     Pain in joint, shoulder region     Type 2 diabetes mellitus without complication (H)     Vitamin D deficiency     Intermittent diarrhea       Wt Readings from Last 2 Encounters:   12/14/21 77.1 kg (170 lb)   10/26/21 77 kg (169 lb 12.8 oz)     BP Readings from Last 3 Encounters:   12/14/21 117/70   10/26/21 116/73   10/12/21 136/78       Allergies   Allergen Reactions     Latex Rash     Latex        Current Outpatient Medications   Medication     ACCU-CHEK GUIDE test strip     amLODIPine (NORVASC) 10 MG tablet     aspirin (ASPIRIN LOW DOSE) 81 MG chewable tablet     atorvastatin (LIPITOR) 40 MG tablet     blood glucose (NO BRAND SPECIFIED) test strip     blood glucose monitoring (ACCU-CHEK COMPACT CARE KIT) meter device kit     blood glucose monitoring (ACCU-CHEK MULTICLIX) lancets     Blood Pressure Monitoring (PREMIUM AUTOMATIC BP MONITOR) CHIARA     empagliflozin (JARDIANCE) 10 MG TABS tablet     famotidine (PEPCID) 20 MG tablet     gabapentin (NEURONTIN) 100 MG capsule     glucose (BD GLUCOSE) 5 g chewable tablet     insulin glargine (LANTUS SOLOSTAR) 100 UNIT/ML pen     insulin pen needle (B-D U/F) 31G X 8 MM miscellaneous     liraglutide (VICTOZA PEN) 18 MG/3ML solution     lisinopril (ZESTRIL) 40 MG tablet     metFORMIN (GLUCOPHAGE) 500 MG tablet     metoprolol tartrate (LOPRESSOR) 100 MG tablet     nitroGLYcerin (NITROSTAT) 0.4 MG " sublingual tablet     Vitamin D3 (CHOLECALCIFEROL) 25 mcg (1000 units) tablet     No current facility-administered medications for this visit.       Social History     Tobacco Use     Smoking status: Never Smoker     Smokeless tobacco: Former User     Types: Snuff   Substance Use Topics     Alcohol use: No     Drug use: No       Honoring Choices - Health Care Directive Guide offered to patient at time of visit.    Health Maintenance Due   Topic Date Due     HIV SCREENING  Never done     HEPATITIS C SCREENING  Never done     ZOSTER IMMUNIZATION (1 of 2) Never done     PREVENTIVE CARE VISIT  03/12/2014     EYE EXAM  06/20/2015     ADVANCE CARE PLANNING  03/12/2018     COLORECTAL CANCER SCREENING  01/10/2021     DIABETIC FOOT EXAM  09/22/2021     COVID-19 Vaccine (3 - Booster for Pfizer series) 11/25/2021     DTAP/TDAP/TD IMMUNIZATION (4 - Td or Tdap) 12/23/2021       Immunization History   Administered Date(s) Administered     COVID-19,PF,Pfizer (12+ Yrs) 05/04/2021, 05/25/2021     DTAP (<7y) 04/02/2003, 05/13/2003     DTaP, Unspecified 12/23/2011     FLU 6-35 months 12/09/2004     Flu, Unspecified 12/09/2004     Influenza (IIV3) PF 12/23/2011     Influenza Vaccine IM > 6 months Valent IIV4 (Alfuria,Fluzone) 12/04/2018, 11/01/2019, 08/13/2020, 08/30/2021     Influenza Vaccine Im 4yrs+ 4 Valent CCIIV4 09/01/2021     MMR 04/02/2003, 05/13/2003     Pneumococcal 23 valent 12/23/2011     TDAP Vaccine (Adacel) 12/23/2011     Td (Adult), Adsorbed 04/02/2003, 05/13/2003     Tdap (Adult) Unspecified Formulation 12/23/2011       No results found for: PAP      Recent Labs   Lab Test 10/26/21  1323 10/14/21  0939 10/12/21  1345 05/04/21  1200 05/04/21  1123 12/15/20  1415 09/22/20  1400 09/22/20  1320 06/11/19  1325 06/11/19  1300 03/12/19  1207 03/12/19  1157 11/20/18  1455 11/20/18  1426 06/06/14  0945   A1C  --   --  8.8 7.6*  --  7.4*   < >  --    < >  --    < >  --    < >  --  8.8*   LDL  --  90  --   --   --   --   --  86   --   --   --  90  --   --  174*   HDL  --  34*  --   --   --   --   --  37*  --   --   --  38*  --   --  31*   TRIG  --  88  --   --   --   --   --  207*  --   --   --  102  --   --  247*   ALT  --   --   --   --   --   --   --  19  --   --   --   --   --  24 28   CR  --  1.02  --   --  1.05  --   --  0.99  --  0.99  --   --   --  0.88 0.83   GFRESTIMATED  --  78  --   --  75  --   --  81  --  81  --   --   --  88 >90   GFRESTBLACK  --   --   --   --  87  --   --  >90  --  >90  --   --   --  >90 >90   ALBUMIN  --   --   --   --   --   --   --  4.0  --   --   --   --   --  4.3 4.1   POTASSIUM 5.0 5.9*  --   --  4.6  --   --  4.3  --  4.3  --   --   --  4.5 4.4   TSH  --  1.06  --   --   --   --   --   --   --  1.35  --   --   --  1.83  --     < > = values in this interval not displayed.       PHQ-2 ( 1999 Pfizer) 10/26/2021 10/12/2021   Q1: Little interest or pleasure in doing things 0 0   Q2: Feeling down, depressed or hopeless 0 1   PHQ-2 Score 0 1   PHQ-2 Total Score (12-17 Years)- Positive if 3 or more points; Administer PHQ-A if positive 0 1       PHQ-9 SCORE 11/20/2018 6/11/2019 10/12/2021   PHQ-9 Total Score 4 8 6       SAURAV-7 SCORE 11/20/2018 6/11/2019 10/12/2021   Total Score 6 5 6       No flowsheet data found.      Jolene Savage, [facility]  December 14, 2021 12:54 PM

## 2021-12-14 NOTE — PROGRESS NOTES
"       HPI       Phani Jeronimo is a 61 year old  who presents for   Chief Complaint   Patient presents with     Diabetes   61 year-old male presents for DM 2 follow up. Had recent phone visit with Lenora STEVENS and med changes made. See note. Had not been taking metformin at HS regularly when BS too low but doing so now. Lantus Insulin dose was reduced to 15 unit per day.   Has adjusted medications as recommended by pharm D  BS in am in the 80s to 90s.  Has note noted signs and symptoms of low blood sugar. Takes 2 tbsps of honey twice a day and this helps. Post meal sugar 120 which is good.   Does not need additional refills at this time  NEuropathy much improved on gabapentin. Only using bid.   Denies chest pain or irregular heart beats. Has not used NTG. Realizes that chest pain occurs if he misses dose of Metoprolol  LIZ: still having some acid symptoms Did not return H pylori test ordered last spring. Open to doing.     Problem, Medication and Allergy Lists were reviewed and updated if needed..    Patient is an established patient of this clinic..         Review of Systems:   Review of Systems  GEN: denies fever, chills, weight changes  ENDO: BS as above  CV: negative for chest pain  Irregular heart beats  RESP: negative for SOB, wheezing.   GI: as per HPI  NEURO: improved neuropathy       Physical Exam:     Vitals:    12/14/21 1252   BP: 117/70   Pulse: 80   Temp: 98.5  F (36.9  C)   TempSrc: Oral   SpO2: 93%   Weight: 77.1 kg (170 lb)   Height: 1.715 m (5' 7.5\")     Body mass index is 26.23 kg/m .  Vitals were reviewed and were normal     Physical Exam  GEN:alert male in NAD; appropriate to quesitons  EYE: STACIE, EOM intact  RESP: Lungs CTA with air entry throughout  CV: HRRR, S1 S2 no MRG.       Results:   No testing ordered today    Assessment and Plan        1. Type 2 diabetes mellitus with hypoglycemia without coma, with long-term current use of insulin (H)    - Adult Eye Referral; Future  " Prefers to go to [facility] for [facility] appointments.     Reinforced adherence to medication regimen  Continue with current medications  Watch for signs and symptoms of low blood sugar  Range of  is good  Next 3 month blood sugar average is due mid January. Jolene will schedule you an appointment with me for January 11 and will do A1c then.   H pylori today as not done when previously ordered and still have LIZ symptoms       There are no discontinued medications.    Options for treatment and follow-up care were reviewed with the patient. Phani Jeronimo  engaged in the decision making process and verbalized understanding of the options discussed and agreed with the final plan.    RICARDO De Luna CNP

## 2021-12-14 NOTE — PATIENT INSTRUCTIONS
Diabetes type 2  Continue with current medications  Watch for signs and symptoms of low blood sugar  Range of  is good  Next 3 month blood sugar average is due mid January. Jolene will schedule you an appointment with me for January 11 and will do A1c then.   H pylori today

## 2021-12-16 NOTE — PROGRESS NOTES
"I have confirmed the patient's and reviewed Past Medical History, Past Surgical History, Social History, Family History, Problem List, Medication List and agree with Tech note.    CC -  Diabetic eye exam     INTERVAL HISTORY - Initial visit      HPI -   Phani Jeronimo is a  61 year old year-old patient presenting to [facility] care for insulin dependent diabetes. No new complaints, happy with vision after cataract surgery.     HbA1c 7.6 in 5/21. 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      Mena Brink MD PhD.  Professor & Chair    "

## 2021-12-16 NOTE — NURSING NOTE
Chief Complaints and History of Present Illnesses   Patient presents with     Diabetic Eye Exam     Chief Complaint(s) and History of Present Illness(es)     Diabetic Eye Exam     Vision: is stable    Associated symptoms: floaters.  Negative for itching and flashes (LE)    Diabetes Type: Type 2    Blood Sugars: is controlled    Treatments tried: no treatments    Pain scale: 0/10              Comments     DM eye exam  See little floating/flying lines in vision LE x 4 years started after cataract surgery.  Blood sugar 109 this am  A1C 8.8 taken 3 months ago  Deborah METZ 1:19 PM December 16, 2021

## 2022-01-01 ENCOUNTER — VIRTUAL VISIT (OUTPATIENT)
Dept: PHARMACY | Facility: CLINIC | Age: 62
End: 2022-01-01
Payer: COMMERCIAL

## 2022-01-01 ENCOUNTER — TELEPHONE (OUTPATIENT)
Dept: FAMILY MEDICINE | Facility: CLINIC | Age: 62
End: 2022-01-01

## 2022-01-01 DIAGNOSIS — Z79.4 TYPE 2 DIABETES MELLITUS WITHOUT COMPLICATION, WITH LONG-TERM CURRENT USE OF INSULIN (H): Primary | ICD-10-CM

## 2022-01-01 DIAGNOSIS — E11.9 TYPE 2 DIABETES MELLITUS WITHOUT COMPLICATION, WITH LONG-TERM CURRENT USE OF INSULIN (H): Primary | ICD-10-CM

## 2022-01-04 NOTE — TELEPHONE ENCOUNTER
TC to patient re: message from Lenora Atkinson D that patient is ill. Called, patient is coughing nonstop and can barely talk. Has not been eating or drinking well. BS by report are low 100s. He was Vaccinated but encouraged him to seek care despite vaccination for COVID as he is quite SOB and not drinking well. He lives alone and is vulnerable. Patient agrees to be seen. Leah SILVA CNP

## 2022-01-04 NOTE — PROGRESS NOTES
Telephone/video visits are billed at different rates depending on your insurance coverage. During this emergency period, for some insurers they may be billed the same as an in-person visit.  Please reach out to your insurance provider with any questions.  If during the course of the call the physician/provider feels a telephone visit is not appropriate, you will not be charged for this service.    SUBJECTIVE: Phani is a 62 year old male who was referred by Leah Ford for [facility] services for medication management. Phani reports he has been sick for the past week.  He is very weak and has not eaten recently. He thinks he does has a fever.  He was coughing and sounded congested on the phone.  He did not think he had COVID since he received the vaccine.    Diabetes:   He notes he has not eaten for several days.  During this time, he has not taken any medication for his diabetes.  He is planning to eat today.  He notes that his blood sugar this morning was 111.    Medication management: Phani notes that he has not heard from [facility] (we discussed this previously so that his medications could be delivered to him).    Environmental factors that impact patient: lives alone.      Patient Active Problem List   Diagnosis     Hypercholesteremia     CARDIOVASCULAR SCREENING; LDL GOAL LESS THAN 100     Hypertension goal BP (blood pressure) < 130/80     Type 2 diabetes, HbA1C goal < 8% (H)     Advanced directives, counseling/discussion     Impingement syndrome, shoulder     Pain in joint, shoulder region     Type 2 diabetes mellitus without complication (H)     Vitamin D deficiency     Intermittent diarrhea        OBJECTIVE:     SAURAV-7 SCORE 11/20/2018 6/11/2019 10/12/2021   Total Score 6 5 6       PHQ-9 SCORE 11/20/2018 6/11/2019 10/12/2021   PHQ-9 Total Score 4 8 6         VITALS:  BP Readings from Last 3 Encounters:   12/14/21 117/70   10/26/21 116/73   10/12/21 136/78           Weight:   Wt Readings from Last 1 Encounters:  "  12/14/21 77.1 kg (170 lb)       Height:   Ht Readings from Last 1 Encounters:   12/14/21 1.715 m (5' 7.5\")       LABORATORY VALUES:   Last A1C:    Lab Results   Component Value Date    A1C 8.8 10/12/2021    A1C 7.6 05/04/2021   .    Last Basic Metabolic Panel:  Lab Results   Component Value Date     10/14/2021     05/04/2021      Lab Results   Component Value Date    POTASSIUM 5.0 10/26/2021    POTASSIUM 4.6 05/04/2021     Lab Results   Component Value Date    CHLORIDE 113 10/14/2021    CHLORIDE 109 05/04/2021     Lab Results   Component Value Date    LIU 9.8 10/14/2021    LIU 9.8 05/04/2021     Lab Results   Component Value Date    CO2 27 10/14/2021    CO2 21 05/04/2021     Lab Results   Component Value Date    BUN 16 10/14/2021    BUN 23 05/04/2021     Lab Results   Component Value Date    CR 1.02 10/14/2021    CR 1.05 05/04/2021     Lab Results   Component Value Date     10/14/2021     05/04/2021       Lipid Panel Labs  Lab Results   Component Value Date    CHOL 142 10/14/2021    CHOL 164 09/22/2020    CHOL 148 03/12/2019     Lab Results   Component Value Date    TRIG 88 10/14/2021    TRIG 207 09/22/2020    TRIG 102 03/12/2019     Lab Results   Component Value Date    HDL 34 10/14/2021    HDL 37 09/22/2020    HDL 38 03/12/2019     Lab Results   Component Value Date    LDL 90 10/14/2021    LDL 86 09/22/2020    LDL 90 03/12/2019       Hepatic Panel Labs  Lab Results   Component Value Date    AST 10 09/22/2020     Lab Results   Component Value Date    ALT 19 09/22/2020         SOCIAL AND FAMILY HISTORY  Social History     Tobacco Use     Smoking status: Never Smoker     Smokeless tobacco: Former User     Types: Snuff   Substance Use Topics     Alcohol use: No    .  Most Recent Immunizations   Administered Date(s) Administered     COVID-19,PF,Pfizer (12+ Yrs) 05/25/2021     DTAP (<7y) 05/13/2003     DTaP, Unspecified 12/23/2011     FLU 6-35 months 12/09/2004     Flu, Unspecified 12/09/2004 "     Influenza (IIV3) PF 12/23/2011     Influenza Vaccine IM > 6 months Valent IIV4 (Alfuria,Fluzone) 08/30/2021     Influenza Vaccine Im 4yrs+ 4 Valent CCIIV4 09/01/2021     MMR 05/13/2003     Pneumococcal 23 valent 12/23/2011     TDAP Vaccine (Adacel) 12/23/2011     Td (Adult), Adsorbed 05/13/2003     Tdap (Adult) Unspecified Formulation 12/23/2011       CURRENT MEDICATIONS:   Current Outpatient Medications   Medication Sig Dispense Refill     ACCU-CHEK GUIDE test strip TEST TWICE A DAY 50 strip 9     amLODIPine (NORVASC) 10 MG tablet Take 1 tablet (10 mg) by mouth daily 90 tablet 3     aspirin (ASPIRIN LOW DOSE) 81 MG chewable tablet Take 1 tablet (81 mg) by mouth daily 100 tablet 3     atorvastatin (LIPITOR) 40 MG tablet Take 1 tablet (40 mg) by mouth daily 90 tablet 3     blood glucose monitoring (ACCU-CHEK COMPACT CARE KIT) meter device kit Use to test blood sugars 1-2 times daily or as directed. 1 kit 0     blood glucose monitoring (ACCU-CHEK MULTICLIX) lancets Use to test blood sugar 1-2 times daily or as directed. 102 each 10     Blood Pressure Monitoring (PREMIUM AUTOMATIC BP MONITOR) CHIARA 1 Device daily 1 each 0     empagliflozin (JARDIANCE) 10 MG TABS tablet Take 1 tablet (10 mg) by mouth daily 30 tablet 3     famotidine (PEPCID) 20 MG tablet TAKE 1 TABLET BY MOUTH TWICE A DAY 60 tablet 11     gabapentin (NEURONTIN) 100 MG capsule Take 1 capsule (100 mg) by mouth 3 times daily 90 capsule 1     glucose (BD GLUCOSE) 5 g chewable tablet Take 2 tablets (10 g) by mouth daily as needed (prn blood sugar < 70) 15 tablet 4     insulin glargine (LANTUS SOLOSTAR) 100 UNIT/ML pen INJECT 15 UNITS SUBCUTANEOUS in the morning and 16 units at night 15 mL 3     insulin pen needle (B-D U/F) 31G X 8 MM miscellaneous USE TO INJECT INSULIN TWICE DAILY AND VICTOZA ONCE DAILY 100 each 9     liraglutide (VICTOZA PEN) 18 MG/3ML solution Inject 1.2 mg Subcutaneous daily 6 mL 5     lisinopril (ZESTRIL) 40 MG tablet Take 1 tablet  (40 mg) by mouth daily 90 tablet 1     metFORMIN (GLUCOPHAGE) 500 MG tablet TAKE 2 TABLETS BY MOUTH 2 TIMES DAILY WITH MEALS 120 tablet 2     metoprolol tartrate (LOPRESSOR) 100 MG tablet Take 1 tablet (100 mg) by mouth 2 times daily 60 tablet 5     nitroGLYcerin (NITROSTAT) 0.4 MG sublingual tablet Place 1 tablet (0.4 mg) under the tongue every 5 minutes as needed for chest pain for 3 doses. If symptoms persist 5 minutes after 1st dose call 911. 25 tablet 0     Vitamin D3 (CHOLECALCIFEROL) 25 mcg (1000 units) tablet Take 1 tablet (25 mcg) by mouth daily 100 tablet 3       ALLERGIES:     Allergies   Allergen Reactions     Latex Rash     Latex           ASSESSMENT:    Illness: Discussed that he could indeed have COVID despite being vaccinated.  It will be best for Phani to be tested for COVID.      Diabetes: Given his illness and fasting for the past several days, agree with Phani around holding insulin. It will be best to determine if Phani has COVID in order to help assess his medication therapy  Medication therapy problem: lab needed to assess safety    Medication management: Called [facility] and they will transfer medications from his current pharmacy and send out pill packs.    All medications were reviewed and found to be indicated, effective, safe and convenient unless drug therapy problem identified as described above.    PLAN:     - Consulted with patient's PCP, Leah Ford.  Leah will call patient to assess illness and help triage next steps.  - Continue to hold insulin until eating again.  - Called [facility] and completed set up of medication delivery for patient  - Will follow up with patient in one week.    Options for treatment and/or follow-up care were reviewed with the patient. Phani Jeronimo was engaged and actively involved in the decision making process. He/She verbalized understanding of the options discussed and was satisfied with the final plan.    Patient was provided with  written instructions/medication list via AVS.       Medical conditions reviewed: 2    Medications reviewed: 3    MTP identified: 1    Time spent: 15 minutes    Level of service: 2

## 2022-03-26 DIAGNOSIS — I10 HYPERTENSION GOAL BP (BLOOD PRESSURE) < 130/80: ICD-10-CM

## 2022-03-30 RX ORDER — LISINOPRIL 40 MG/1
TABLET ORAL
Qty: 90 TABLET | Refills: 1 | Status: SHIPPED | OUTPATIENT
Start: 2022-03-30

## 2022-06-22 NOTE — NURSING NOTE
"59 year old  Chief Complaint   Patient presents with     Diabetes     F/U no concerns \"it's fine\"       Blood pressure 127/69, pulse 91, temperature 97.5  F (36.4  C), temperature source Oral, height 1.725 m (5' 7.9\"), weight 80.7 kg (178 lb), SpO2 96 %. Body mass index is 27.14 kg/m .  BP completed using cuff size:    Patient Active Problem List   Diagnosis     Hypercholesteremia     CARDIOVASCULAR SCREENING; LDL GOAL LESS THAN 100     Hypertension goal BP (blood pressure) < 130/80     Type 2 diabetes, HbA1C goal < 8% (H)     Advanced directives, counseling/discussion     Impingement syndrome, shoulder     Pain in joint, shoulder region     Type 2 diabetes mellitus without complication (H)     Vitamin D deficiency       Wt Readings from Last 2 Encounters:   09/10/19 80.7 kg (178 lb)   08/06/19 79.8 kg (176 lb)     BP Readings from Last 3 Encounters:   09/10/19 127/69   08/06/19 135/73   06/25/19 123/73       Allergies   Allergen Reactions     Latex Rash     Latex        Current Outpatient Medications   Medication     amLODIPine (NORVASC) 10 MG tablet     aspirin 81 MG chewable tablet     atorvastatin (LIPITOR) 40 MG tablet     blood glucose (NO BRAND SPECIFIED) test strip     blood glucose (NO BRAND SPECIFIED) test strip     blood glucose monitoring (ACCU-CHEK COMPACT CARE KIT) meter device kit     Blood Pressure Monitoring (PREMIUM AUTOMATIC BP MONITOR) CHIARA     glucose (BD GLUCOSE) 5 g chewable tablet     insulin glargine (LANTUS SOLOSTAR PEN) 100 UNIT/ML pen     insulin pen needle (31G X 8 MM) 31G X 8 MM miscellaneous     liraglutide (VICTOZA) 18 MG/3ML solution     lisinopril (PRINIVIL/ZESTRIL) 40 MG tablet     metFORMIN (GLUCOPHAGE) 500 MG tablet     metoprolol tartrate (LOPRESSOR) 100 MG tablet     ranitidine (ZANTAC) 150 MG tablet     vitamin D3 (CHOLECALCIFEROL) 1000 units (25 mcg) tablet     No current facility-administered medications for this visit.        Social History     Tobacco Use     Smoking " status: Never Smoker     Smokeless tobacco: Former User     Types: Snuff   Substance Use Topics     Alcohol use: No     Drug use: No       Honoring Choices - Health Care Directive Guide offered to patient at time of visit.    Health Maintenance Due   Topic Date Due     HEPATITIS C SCREENING  1960     COLONOSCOPY  01/01/1968     HIV SCREENING  01/01/1973     ZOSTER IMMUNIZATION (1 of 2) 01/01/2008     PREVENTIVE CARE VISIT  03/12/2014     DIABETIC FOOT EXAM  06/12/2015     EYE EXAM  06/20/2015     ADVANCE CARE PLANNING  03/12/2018     INFLUENZA VACCINE (1) 09/01/2019       Immunization History   Administered Date(s) Administered     DTAP (<7y) 04/02/2003, 05/13/2003     DTaP, Unspecified 12/23/2011     FLU 6-35 months 12/09/2004     Flu, Unspecified 12/09/2004     Influenza (IIV3) PF 12/23/2011     Influenza Vaccine IM > 6 months Valent IIV4 12/04/2018     MMR 04/02/2003, 05/13/2003     Pneumococcal 23 valent 12/23/2011     TDAP Vaccine (Adacel) 12/23/2011     Td (Adult), Adsorbed 04/02/2003, 05/13/2003     Tdap (Adult) Unspecified Formulation 12/23/2011       No results found for: PAP      Recent Labs   Lab Test 06/11/19  1325 06/11/19  1300 03/12/19  1207 03/12/19  1157 11/20/18  1455 11/20/18  1426 06/06/14  0945 02/18/14  0826 01/08/14  1521   A1C 8.5*  --  7.7*  --  7.0*  --  8.8* 9.1*  --    LDL  --   --   --  90  --   --  174* 110  --    HDL  --   --   --  38*  --   --  31* 35*  --    TRIG  --   --   --  102  --   --  247* 192*  --    ALT  --   --   --   --   --  24 28  --  30   CR  --  0.99  --   --   --  0.88 0.83  --  0.88   GFRESTIMATED  --  81  --   --   --  88 >90  --  90   GFRESTBLACK  --  >90  --   --   --  >90 >90  --  >90   ALBUMIN  --   --   --   --   --  4.3 4.1  --  4.1   POTASSIUM  --  4.3  --   --   --  4.5 4.4  --  4.3   TSH  --  1.35  --   --   --  1.83  --   --  1.83       PHQ-2 ( 1999 Pfizer) 6/11/2019 12/4/2018   Q1: Little interest or pleasure in doing things 2 0   Q2: Feeling down,  depressed or hopeless 1 0   PHQ-2 Score 3 0       PHQ-9 SCORE 11/20/2018 6/11/2019   PHQ-9 Total Score 4 8       SAURAV-7 SCORE 11/20/2018 6/11/2019   Total Score 6 5       No flowsheet data found.      Jolene Savage, [facility]  September 10, 2019 1:14 PM     66

## 2024-03-19 NOTE — TELEPHONE ENCOUNTER
Stable   no SOB, cough, wheeze   DuoNebs   monitor   Received request that needs accucheck glucometer. This was ordered at last visit after consultation with PharmD.  Reordered device and test strips. Called pharmacy to verify received. Informed that [employer] will only be making Accuchek Guide in future. This is what was dispensed so patient is good for now. Leah RUFFIN CNP

## 2024-11-25 NOTE — TELEPHONE ENCOUNTER
Completed phone visit with patient today.  Phani noted that these refills were requested and that he was out of some of these medications and so unable to take them.  Refilled these medications with the knowledge that he will come in for in person visit with PCP in one week.   Patient scheduled for colonoscopy on 2/5/25 with ligia  Case request completed/Referral completed   prep to be ordered by clinical team [place]-message routed   Preferred pharmacy selected   instructions reviewed with patient and  sent via patient portal   Shannan Nassar  verbalized understanding of information given.  Mari Small November 25, 2024    Was advised to call if there is an update to insurance ASAP    New ins 1/1/25